# Patient Record
Sex: FEMALE | Race: WHITE | Employment: FULL TIME | URBAN - METROPOLITAN AREA
[De-identification: names, ages, dates, MRNs, and addresses within clinical notes are randomized per-mention and may not be internally consistent; named-entity substitution may affect disease eponyms.]

---

## 2017-02-10 ENCOUNTER — OFFICE VISIT (OUTPATIENT)
Dept: FAMILY MEDICINE CLINIC | Age: 57
End: 2017-02-10

## 2017-02-10 VITALS
HEART RATE: 76 BPM | SYSTOLIC BLOOD PRESSURE: 130 MMHG | DIASTOLIC BLOOD PRESSURE: 82 MMHG | OXYGEN SATURATION: 96 % | BODY MASS INDEX: 30.9 KG/M2 | WEIGHT: 177.2 LBS | TEMPERATURE: 97.7 F

## 2017-02-10 DIAGNOSIS — I10 ESSENTIAL HYPERTENSION: ICD-10-CM

## 2017-02-10 DIAGNOSIS — I25.10 CORONARY ARTERY DISEASE INVOLVING NATIVE CORONARY ARTERY OF NATIVE HEART WITHOUT ANGINA PECTORIS: Primary | ICD-10-CM

## 2017-02-10 DIAGNOSIS — R25.2 MUSCLE CRAMPING: ICD-10-CM

## 2017-02-10 DIAGNOSIS — E78.00 HYPERCHOLESTEREMIA: ICD-10-CM

## 2017-02-10 DIAGNOSIS — R73.9 HYPERGLYCEMIA: ICD-10-CM

## 2017-02-10 DIAGNOSIS — Z12.31 SCREENING MAMMOGRAM, ENCOUNTER FOR: ICD-10-CM

## 2017-02-10 LAB
ALT SERPL-CCNC: 28 U/L (ref 10–40)
ANION GAP SERPL CALCULATED.3IONS-SCNC: 13 MMOL/L (ref 3–16)
BUN BLDV-MCNC: 12 MG/DL (ref 7–20)
CALCIUM SERPL-MCNC: 9.2 MG/DL (ref 8.3–10.6)
CHLORIDE BLD-SCNC: 103 MMOL/L (ref 99–110)
CHOLESTEROL, TOTAL: 145 MG/DL (ref 0–199)
CO2: 26 MMOL/L (ref 21–32)
CREAT SERPL-MCNC: 0.7 MG/DL (ref 0.6–1.1)
GFR AFRICAN AMERICAN: >60
GFR NON-AFRICAN AMERICAN: >60
GLUCOSE BLD-MCNC: 119 MG/DL (ref 70–99)
HDLC SERPL-MCNC: 45 MG/DL (ref 40–60)
LDL CHOLESTEROL CALCULATED: 79 MG/DL
MAGNESIUM: 2.1 MG/DL (ref 1.8–2.4)
POTASSIUM SERPL-SCNC: 4.7 MMOL/L (ref 3.5–5.1)
SODIUM BLD-SCNC: 142 MMOL/L (ref 136–145)
TRIGL SERPL-MCNC: 103 MG/DL (ref 0–150)
VLDLC SERPL CALC-MCNC: 21 MG/DL

## 2017-02-10 PROCEDURE — 36415 COLL VENOUS BLD VENIPUNCTURE: CPT | Performed by: FAMILY MEDICINE

## 2017-02-10 PROCEDURE — 99214 OFFICE O/P EST MOD 30 MIN: CPT | Performed by: FAMILY MEDICINE

## 2017-02-10 RX ORDER — NITROGLYCERIN 0.4 MG/1
0.4 TABLET SUBLINGUAL EVERY 5 MIN PRN
Qty: 25 TABLET | Refills: 1 | Status: SHIPPED | OUTPATIENT
Start: 2017-02-10 | End: 2018-03-28 | Stop reason: SDUPTHER

## 2017-02-11 LAB
ESTIMATED AVERAGE GLUCOSE: 139.9 MG/DL
HBA1C MFR BLD: 6.5 %

## 2017-05-08 ENCOUNTER — TELEPHONE (OUTPATIENT)
Dept: FAMILY MEDICINE CLINIC | Age: 57
End: 2017-05-08

## 2017-05-11 ENCOUNTER — OFFICE VISIT (OUTPATIENT)
Dept: FAMILY MEDICINE CLINIC | Age: 57
End: 2017-05-11

## 2017-05-11 VITALS
BODY MASS INDEX: 31.01 KG/M2 | DIASTOLIC BLOOD PRESSURE: 80 MMHG | OXYGEN SATURATION: 92 % | WEIGHT: 175 LBS | TEMPERATURE: 97.6 F | HEART RATE: 76 BPM | SYSTOLIC BLOOD PRESSURE: 138 MMHG | HEIGHT: 63 IN

## 2017-05-11 DIAGNOSIS — R53.83 FATIGUE, UNSPECIFIED TYPE: Primary | ICD-10-CM

## 2017-05-11 DIAGNOSIS — Z12.31 SCREENING MAMMOGRAM, ENCOUNTER FOR: ICD-10-CM

## 2017-05-11 DIAGNOSIS — M54.2 NECK PAIN: ICD-10-CM

## 2017-05-11 DIAGNOSIS — R73.9 HYPERGLYCEMIA: ICD-10-CM

## 2017-05-11 DIAGNOSIS — I25.10 CORONARY ARTERY DISEASE INVOLVING NATIVE CORONARY ARTERY OF NATIVE HEART WITHOUT ANGINA PECTORIS: ICD-10-CM

## 2017-05-11 DIAGNOSIS — I10 ESSENTIAL HYPERTENSION: ICD-10-CM

## 2017-05-11 LAB
BASOPHILS ABSOLUTE: 0.1 K/UL (ref 0–0.2)
BASOPHILS RELATIVE PERCENT: 0.9 %
EOSINOPHILS ABSOLUTE: 0.2 K/UL (ref 0–0.6)
EOSINOPHILS RELATIVE PERCENT: 3 %
HCT VFR BLD CALC: 49.7 % (ref 36–48)
HEMOGLOBIN: 16.1 G/DL (ref 12–16)
LYMPHOCYTES ABSOLUTE: 2.7 K/UL (ref 1–5.1)
LYMPHOCYTES RELATIVE PERCENT: 31.7 %
MCH RBC QN AUTO: 30.1 PG (ref 26–34)
MCHC RBC AUTO-ENTMCNC: 32.4 G/DL (ref 31–36)
MCV RBC AUTO: 93 FL (ref 80–100)
MONOCYTES ABSOLUTE: 0.7 K/UL (ref 0–1.3)
MONOCYTES RELATIVE PERCENT: 8 %
NEUTROPHILS ABSOLUTE: 4.8 K/UL (ref 1.7–7.7)
NEUTROPHILS RELATIVE PERCENT: 56.4 %
PDW BLD-RTO: 14.4 % (ref 12.4–15.4)
PLATELET # BLD: 287 K/UL (ref 135–450)
PMV BLD AUTO: 8.4 FL (ref 5–10.5)
RBC # BLD: 5.35 M/UL (ref 4–5.2)
WBC # BLD: 8.4 K/UL (ref 4–11)

## 2017-05-11 PROCEDURE — 36415 COLL VENOUS BLD VENIPUNCTURE: CPT | Performed by: FAMILY MEDICINE

## 2017-05-11 PROCEDURE — 99214 OFFICE O/P EST MOD 30 MIN: CPT | Performed by: FAMILY MEDICINE

## 2017-05-11 RX ORDER — PREDNISONE 20 MG/1
20 TABLET ORAL DAILY
Qty: 7 TABLET | Refills: 0 | Status: SHIPPED | OUTPATIENT
Start: 2017-05-11 | End: 2017-09-13 | Stop reason: SDUPTHER

## 2017-05-12 LAB
ALT SERPL-CCNC: 19 U/L (ref 10–40)
ANION GAP SERPL CALCULATED.3IONS-SCNC: 13 MMOL/L (ref 3–16)
BUN BLDV-MCNC: 12 MG/DL (ref 7–20)
CALCIUM SERPL-MCNC: 9.6 MG/DL (ref 8.3–10.6)
CHLORIDE BLD-SCNC: 102 MMOL/L (ref 99–110)
CHOLESTEROL, TOTAL: 194 MG/DL (ref 0–199)
CO2: 26 MMOL/L (ref 21–32)
CREAT SERPL-MCNC: 0.7 MG/DL (ref 0.6–1.1)
ESTIMATED AVERAGE GLUCOSE: 148.5 MG/DL
GFR AFRICAN AMERICAN: >60
GFR NON-AFRICAN AMERICAN: >60
GLUCOSE BLD-MCNC: 96 MG/DL (ref 70–99)
HBA1C MFR BLD: 6.8 %
HDLC SERPL-MCNC: 44 MG/DL (ref 40–60)
LDL CHOLESTEROL CALCULATED: 101 MG/DL
POTASSIUM SERPL-SCNC: 4.6 MMOL/L (ref 3.5–5.1)
SEDIMENTATION RATE, ERYTHROCYTE: 9 MM/HR (ref 0–30)
SODIUM BLD-SCNC: 141 MMOL/L (ref 136–145)
TRIGL SERPL-MCNC: 243 MG/DL (ref 0–150)
TSH REFLEX: 0.67 UIU/ML (ref 0.27–4.2)
VLDLC SERPL CALC-MCNC: 49 MG/DL

## 2017-05-18 ENCOUNTER — OFFICE VISIT (OUTPATIENT)
Dept: CARDIOLOGY CLINIC | Age: 57
End: 2017-05-18

## 2017-05-18 VITALS
WEIGHT: 173.5 LBS | DIASTOLIC BLOOD PRESSURE: 82 MMHG | HEIGHT: 63 IN | SYSTOLIC BLOOD PRESSURE: 138 MMHG | OXYGEN SATURATION: 92 % | HEART RATE: 71 BPM | BODY MASS INDEX: 30.74 KG/M2

## 2017-05-18 DIAGNOSIS — I10 ESSENTIAL HYPERTENSION: ICD-10-CM

## 2017-05-18 DIAGNOSIS — Z72.0 TOBACCO ABUSE: ICD-10-CM

## 2017-05-18 DIAGNOSIS — E78.00 HYPERCHOLESTEREMIA: ICD-10-CM

## 2017-05-18 DIAGNOSIS — I25.10 CORONARY ARTERY DISEASE INVOLVING NATIVE CORONARY ARTERY OF NATIVE HEART WITHOUT ANGINA PECTORIS: Primary | ICD-10-CM

## 2017-05-18 DIAGNOSIS — R60.0 BILATERAL EDEMA OF LOWER EXTREMITY: ICD-10-CM

## 2017-05-18 PROCEDURE — 99214 OFFICE O/P EST MOD 30 MIN: CPT | Performed by: INTERNAL MEDICINE

## 2017-05-18 RX ORDER — ROSUVASTATIN CALCIUM 20 MG/1
20 TABLET, COATED ORAL DAILY
Qty: 30 TABLET | Refills: 11 | Status: SHIPPED | OUTPATIENT
Start: 2017-05-18 | End: 2018-05-29 | Stop reason: SDUPTHER

## 2017-06-07 ENCOUNTER — TELEPHONE (OUTPATIENT)
Dept: FAMILY MEDICINE CLINIC | Age: 57
End: 2017-06-07

## 2017-07-21 ENCOUNTER — TELEPHONE (OUTPATIENT)
Dept: FAMILY MEDICINE CLINIC | Age: 57
End: 2017-07-21

## 2017-08-03 ENCOUNTER — HOSPITAL ENCOUNTER (OUTPATIENT)
Dept: MAMMOGRAPHY | Age: 57
Discharge: HOME | End: 2017-08-03
Attending: OBSTETRICS & GYNECOLOGY | Admitting: OBSTETRICS & GYNECOLOGY

## 2017-08-03 DIAGNOSIS — Z12.31 SCREENING MAMMOGRAM, ENCOUNTER FOR: ICD-10-CM

## 2017-08-07 DIAGNOSIS — R92.8 ABNORMAL MAMMOGRAM: ICD-10-CM

## 2017-08-07 DIAGNOSIS — R92.8 ABNORMAL MAMMOGRAM OF RIGHT BREAST: Primary | ICD-10-CM

## 2017-08-17 ENCOUNTER — HOSPITAL ENCOUNTER (OUTPATIENT)
Dept: MAMMOGRAPHY | Age: 57
Discharge: HOME | End: 2017-08-17
Attending: OBSTETRICS & GYNECOLOGY | Admitting: OBSTETRICS & GYNECOLOGY

## 2017-08-17 DIAGNOSIS — R92.8 ABNORMAL MAMMOGRAM: ICD-10-CM

## 2017-08-17 DIAGNOSIS — R92.2 INCONCLUSIVE MAMMOGRAM: ICD-10-CM

## 2017-08-21 DIAGNOSIS — R92.8 ABNORMAL MAMMOGRAM: Primary | ICD-10-CM

## 2017-09-05 ENCOUNTER — TELEPHONE (OUTPATIENT)
Dept: FAMILY MEDICINE CLINIC | Age: 57
End: 2017-09-05

## 2017-09-05 RX ORDER — NITROFURANTOIN 25; 75 MG/1; MG/1
100 CAPSULE ORAL 2 TIMES DAILY
Qty: 20 CAPSULE | Refills: 0 | Status: SHIPPED | OUTPATIENT
Start: 2017-09-05 | End: 2017-09-15

## 2017-09-13 ENCOUNTER — OFFICE VISIT (OUTPATIENT)
Dept: FAMILY MEDICINE CLINIC | Age: 57
End: 2017-09-13

## 2017-09-13 VITALS
DIASTOLIC BLOOD PRESSURE: 66 MMHG | WEIGHT: 176.2 LBS | HEART RATE: 86 BPM | BODY MASS INDEX: 31.71 KG/M2 | OXYGEN SATURATION: 94 % | TEMPERATURE: 97.9 F | SYSTOLIC BLOOD PRESSURE: 110 MMHG

## 2017-09-13 DIAGNOSIS — I25.10 CORONARY ARTERY DISEASE INVOLVING NATIVE CORONARY ARTERY OF NATIVE HEART WITHOUT ANGINA PECTORIS: ICD-10-CM

## 2017-09-13 DIAGNOSIS — Z12.9 CANCER SCREENING: ICD-10-CM

## 2017-09-13 DIAGNOSIS — I10 ESSENTIAL HYPERTENSION: Primary | ICD-10-CM

## 2017-09-13 DIAGNOSIS — E78.00 HYPERCHOLESTEREMIA: ICD-10-CM

## 2017-09-13 DIAGNOSIS — M54.50 ACUTE BILATERAL LOW BACK PAIN WITHOUT SCIATICA: ICD-10-CM

## 2017-09-13 PROCEDURE — 99214 OFFICE O/P EST MOD 30 MIN: CPT | Performed by: FAMILY MEDICINE

## 2017-09-13 RX ORDER — PREDNISONE 20 MG/1
20 TABLET ORAL DAILY
Qty: 7 TABLET | Refills: 0 | Status: SHIPPED | OUTPATIENT
Start: 2017-09-13 | End: 2017-09-21 | Stop reason: SDUPTHER

## 2017-09-13 ASSESSMENT — PATIENT HEALTH QUESTIONNAIRE - PHQ9
2. FEELING DOWN, DEPRESSED OR HOPELESS: 0
1. LITTLE INTEREST OR PLEASURE IN DOING THINGS: 0
SUM OF ALL RESPONSES TO PHQ9 QUESTIONS 1 & 2: 0
SUM OF ALL RESPONSES TO PHQ QUESTIONS 1-9: 0

## 2017-09-15 ENCOUNTER — TELEPHONE (OUTPATIENT)
Dept: FAMILY MEDICINE CLINIC | Age: 57
End: 2017-09-15

## 2017-09-18 RX ORDER — CLOPIDOGREL BISULFATE 75 MG/1
TABLET ORAL
Qty: 30 TABLET | Refills: 4 | Status: ON HOLD | OUTPATIENT
Start: 2017-09-18 | End: 2017-12-05

## 2017-09-21 RX ORDER — PREDNISONE 20 MG/1
20 TABLET ORAL DAILY
Qty: 7 TABLET | Refills: 0 | Status: SHIPPED | OUTPATIENT
Start: 2017-09-21 | End: 2022-05-03 | Stop reason: SDUPTHER

## 2017-09-28 ENCOUNTER — TELEPHONE (OUTPATIENT)
Dept: FAMILY MEDICINE CLINIC | Age: 57
End: 2017-09-28

## 2017-10-31 ENCOUNTER — TELEPHONE (OUTPATIENT)
Dept: FAMILY MEDICINE CLINIC | Age: 57
End: 2017-10-31

## 2017-10-31 NOTE — TELEPHONE ENCOUNTER
PA for [facility] sent through cover my meds and was approved - pharmacy will be notified when they open

## 2017-11-13 ENCOUNTER — TELEPHONE (OUTPATIENT)
Dept: CARDIOLOGY CLINIC | Age: 57
End: 2017-11-13

## 2017-11-13 NOTE — TELEPHONE ENCOUNTER
Patient called and states she was sitting in a chair this morning and started to feel like she has pressure on her chest. No nausea, diaphoresis, or SOB at the time. She state she felt drained and went to sleep. Woke up with no pain. She sees Erica Donald next week. She is wondering if she needs any test ordered. I advised her to go to the ER if pain returns.

## 2017-11-21 ENCOUNTER — OFFICE VISIT (OUTPATIENT)
Dept: CARDIOLOGY CLINIC | Age: 57
End: 2017-11-21

## 2017-11-21 VITALS
SYSTOLIC BLOOD PRESSURE: 84 MMHG | OXYGEN SATURATION: 95 % | HEIGHT: 63 IN | HEART RATE: 87 BPM | WEIGHT: 191 LBS | TEMPERATURE: 98.4 F | DIASTOLIC BLOOD PRESSURE: 48 MMHG | BODY MASS INDEX: 33.84 KG/M2

## 2017-11-21 DIAGNOSIS — E78.2 MIXED HYPERLIPIDEMIA: ICD-10-CM

## 2017-11-21 DIAGNOSIS — Z72.0 TOBACCO ABUSE: ICD-10-CM

## 2017-11-21 DIAGNOSIS — I25.10 CORONARY ARTERY DISEASE INVOLVING NATIVE CORONARY ARTERY OF NATIVE HEART WITHOUT ANGINA PECTORIS: Primary | ICD-10-CM

## 2017-11-21 DIAGNOSIS — I10 ESSENTIAL HYPERTENSION: ICD-10-CM

## 2017-11-21 PROCEDURE — 99214 OFFICE O/P EST MOD 30 MIN: CPT | Performed by: NURSE PRACTITIONER

## 2017-11-21 RX ORDER — RANOLAZINE 500 MG/1
500 TABLET, EXTENDED RELEASE ORAL 2 TIMES DAILY
Qty: 60 TABLET | Refills: 3
Start: 2017-11-21 | End: 2017-12-12 | Stop reason: SDUPTHER

## 2017-11-21 NOTE — PROGRESS NOTES
[address]   Cardiac Evaluation    Primary Care Doctor: Elisa Ascencio MD    Chief Complaint   Patient presents with    Coronary Artery Disease    Chest Pain     heartburn occ.  Shortness of Breath     with exertion    Fatigue        History of Present Illness:   I had the pleasure of seeing Navjot Latif in follow up for CAD s/p PCI to RCA and to LCx in 2014, HTN, HLD and tobacco use. The Lipitor was changed to Crestor in May. She called about 1 week ago with complaints of chest pressure associated with fatigue that resolved with sleep. Today she reports having indigestion symptoms, burning sensation, occurs at activity. She had a feeling of overwhelming fatigue and faintness that lasted several minutes then she felt better. She is under extreme stress as  is actively dying, she has been off work caring for him. This indigestion burning sensation is what she felt at time of MI. She denies any palpitations. Her breathing is stable. She uses inhalers regularly. She smokes 1/2 ppd, had previously quit for 4.5 years then just gradually restarted. Not interested in quitting at this time. Navjot Latif describes symptoms including chest pain, dyspnea, fatigue but denies palpitations, orthopnea, PND, early saiety, edema, syncope. NYHA:   III  ACC/ AHA Stage:    C    Past Medical History:   has a past medical history of Asthma; CAD (coronary artery disease); Depression; HTN; Hyperglycemia; and Hyperlipidemia. Surgical History:   has a past surgical history that includes Breast biopsy; Coronary angioplasty with stent (03/07/14); and Coronary angioplasty with stent (03/26/14). Social History:   reports that she has been smoking Cigarettes. She has a 5.00 pack-year smoking history. She has never used smokeless tobacco. She reports that she does not drink alcohol or use drugs.    Family History:   Family History   Problem Relation Age of Onset    Heart Attack Mother  Heart Disease Mother     Diabetes Mother     Heart Failure Father     Heart Disease Father        Home Medications:  Prior to Admission medications    Medication Sig Start Date End Date Taking? Authorizing Provider   clopidogrel (PLAVIX) 75 MG tablet TAKE ONE TABLET BY MOUTH DAILY 9/18/17  Yes Demario Vega MD   fluticasone-salmeterol (ADVAIR DISKUS) 250-50 MCG/DOSE AEPB Inhale 1 puff into the lungs 2 times daily 8/28/17  Yes Delmi Villarreal MD   rosuvastatin (CRESTOR) 20 MG tablet Take 1 tablet by mouth daily 5/18/17  Yes Demario Vega MD   BREO ELLIPTA 200-25 MCG/INH AEPB INHALE ONE PUFF BY MOUTH ONCE DAILY 5/11/17  Yes Delmi Villarreal MD   nitroGLYCERIN (NITROSTAT) 0.4 MG SL tablet Place 1 tablet under the tongue every 5 minutes as needed for Chest pain 2/10/17  Yes Delmi Villarreal MD   Handicap Placard MISC by Does not apply route 5 years  DX: I25.10, J45.30 6/11/16  Yes Delmi Villarreal MD   metoprolol (LOPRESSOR) 25 MG tablet Take 0.5 tablets by mouth daily 12/29/15  Yes Delmi Villarreal MD   betamethasone valerate (VALISONE) 0.1 % cream APPLY TOPICALLY TWO TIMES A DAY AS NEEDED 7/15/15  Yes Delmi Villarreal MD   aspirin 81 MG chewable tablet Take 1 tablet by mouth daily. 3/27/14  Yes Margot Clifton CNP   albuterol (PROVENTIL HFA;VENTOLIN HFA) 108 (90 BASE) MCG/ACT inhaler Inhale 2 puffs into the lungs every 6 hours as needed. 8/3/13  Yes Delmi Villarreal MD        Allergies:  Review of patient's allergies indicates no known allergies. Review of Systems:   · Constitutional: there has been no unanticipated weight loss. There's been no change in energy level, sleep pattern, or activity level. · Eyes: No visual changes or diplopia. No scleral icterus. · ENT: No Headaches, hearing loss or vertigo. No mouth sores or sore throat. · Cardiovascular: Reviewed in HPI  · Respiratory: No cough or wheezing, no sputum production. No hematemesis.     · Gastrointestinal: No abdominal pain, appetite was small, but had no angiographic evidence of   disease, bifurcating into the LAD and left circumflex. 2. The LAD had a 70% lesion noted within the mid LAD just after a septal   branch. This was negative on the stress test with no evidence of ischemia   whatsoever and therefore left alone at this time. 3. The left circumflex was short. It had a large OM 1 branch that acted   more like a ramus that had very mild disease throughout its course. Just   after this, there was a very short focal segment of 70% to 75% stenosis in   the mid circumflex. This was the lesion that was intervened upon at this   time. 4. The right coronary artery was noted to be dominant. It had a patent   stent that was present in the mid RCA with no evidence of complication. ASSESSMENT: Successful percutaneous intervention to the mid left circumflex   using Xience Xpedition 2.25-mm x 8-mm. The 75% stenosis was reduced to 0%   with no complication and maintenance of ELIGIO 3 flow. Assessment:    1. Coronary artery disease involving native coronary artery of native heart without angina pectoris    2. Essential hypertension    3. Mixed hyperlipidemia    4. Tobacco abuse          Plan:   1. Stop the metoprolol completely  2. Start Ranexa 500 mg twice daily for the chest discomfort  3. Use the nitroglycerin as needed for the indigestion/ burning in chest  4. Schedule the stress test at [facility]  5. Stay hydrated (replace 2 cups of coffee with another bottle of water)  6. We will call you with the results of stress test and if further testing/ treatment needed  7. Follow up with me in 4-6 weeks at [facility] office      I appreciate the opportunity of cooperating in the care of this individual.    Stephanie Castle CNP, 11/21/2017, 12:53 PM    QUALITY MEASURES  1. Tobacco Cessation Counseling: Yes  2. Retake of BP if >140/90:   NA  3. Documentation to PCP/referring for new patient:  Sent to PCP at close of office visit  4.  CAD patient

## 2017-11-27 ENCOUNTER — OFFICE VISIT (OUTPATIENT)
Dept: FAMILY MEDICINE CLINIC | Age: 57
End: 2017-11-27

## 2017-11-27 VITALS
WEIGHT: 190 LBS | TEMPERATURE: 97.7 F | BODY MASS INDEX: 34.2 KG/M2 | DIASTOLIC BLOOD PRESSURE: 74 MMHG | SYSTOLIC BLOOD PRESSURE: 110 MMHG | OXYGEN SATURATION: 91 % | HEART RATE: 87 BPM

## 2017-11-27 DIAGNOSIS — I10 ESSENTIAL HYPERTENSION: ICD-10-CM

## 2017-11-27 DIAGNOSIS — I25.10 CORONARY ARTERY DISEASE INVOLVING NATIVE CORONARY ARTERY OF NATIVE HEART WITHOUT ANGINA PECTORIS: ICD-10-CM

## 2017-11-27 DIAGNOSIS — F32.A DEPRESSIVE DISORDER: ICD-10-CM

## 2017-11-27 DIAGNOSIS — M54.50 ACUTE BILATERAL LOW BACK PAIN WITHOUT SCIATICA: Primary | ICD-10-CM

## 2017-11-27 PROCEDURE — 99214 OFFICE O/P EST MOD 30 MIN: CPT | Performed by: FAMILY MEDICINE

## 2017-11-27 RX ORDER — PREDNISONE 20 MG/1
40 TABLET ORAL DAILY
Qty: 20 TABLET | Refills: 0 | Status: ON HOLD | OUTPATIENT
Start: 2017-11-27 | End: 2017-12-04

## 2017-11-27 NOTE — LETTER
'                [address] [place] [address]  Phone: [phone]  Fax: [phone]    Darlin Nunez MD        November 27, 2017     Patient: Melanie Garcia   YOB: 1960   Date of Visit: 11/27/2017       To Whom it May Concern: Latoya Fox was seen in my clinic on 11/27/2017. Please excuse her from work for 2 weeks. If you have any questions or concerns, please don't hesitate to call.     Sincerely,           Darlin Nunez MD

## 2017-11-28 ENCOUNTER — HOSPITAL ENCOUNTER (OUTPATIENT)
Dept: NON INVASIVE DIAGNOSTICS | Age: 57
Discharge: HOME | End: 2017-11-28
Admitting: NURSE PRACTITIONER

## 2017-11-28 VITALS — TEMPERATURE: 97.6 F

## 2017-11-28 DIAGNOSIS — I25.10 ATHEROSCLEROTIC HEART DISEASE OF NATIVE CORONARY ARTERY WITHOUT ANGINA PECTORIS: ICD-10-CM

## 2017-11-28 LAB
LV EF: 65 %
LVEF MODALITY: NORMAL

## 2017-11-28 ASSESSMENT — PAIN - FUNCTIONAL ASSESSMENT
PAIN_FUNCTIONAL_ASSESSMENT: 0-10
PAIN_FUNCTIONAL_ASSESSMENT: 0-10

## 2017-11-28 NOTE — PROGRESS NOTES
Pt educated on cardiac stress testing. Pt verbalizes understanding to cardiac stress testing. Questions and concerns addressed. Pt is agreeable to proceed with stress testing.

## 2017-11-29 ENCOUNTER — TELEPHONE (OUTPATIENT)
Dept: FAMILY MEDICINE CLINIC | Age: 57
End: 2017-11-29

## 2017-11-29 ENCOUNTER — TELEPHONE (OUTPATIENT)
Dept: CARDIOLOGY CLINIC | Age: 57
End: 2017-11-29

## 2017-11-29 NOTE — TELEPHONE ENCOUNTER
Left message for patient to call 1. We have not received [facility] Papers and 2.  Nadir aguirre appt scheduled on 12-2-17 does is need to be cancelled

## 2017-11-30 NOTE — TELEPHONE ENCOUNTER
Spoke with patient. Patient is scheduled with Dr. Michelle Boeck for Left Heart Cath on 12/4/17 at [facility], arrival time of 9:30am to the Cath Lab. Please have patient arrive at 9:15am to the main entrance of [facility] and check in with the registration desk. Please call patient regarding medication instructions. [email or web address] VILLAGOMEZ.

## 2017-12-04 PROBLEM — Z98.61 S/P PTCA (PERCUTANEOUS TRANSLUMINAL CORONARY ANGIOPLASTY): Status: ACTIVE | Noted: 2017-12-04

## 2017-12-04 PROBLEM — I20.0 UNSTABLE ANGINA (HCC): Status: ACTIVE | Noted: 2017-12-04

## 2017-12-11 ENCOUNTER — TELEPHONE (OUTPATIENT)
Dept: OTHER | Facility: CLINIC | Age: 57
End: 2017-12-11

## 2017-12-12 ENCOUNTER — OFFICE VISIT (OUTPATIENT)
Dept: FAMILY MEDICINE CLINIC | Age: 57
End: 2017-12-12

## 2017-12-12 VITALS
OXYGEN SATURATION: 96 % | DIASTOLIC BLOOD PRESSURE: 104 MMHG | BODY MASS INDEX: 33.8 KG/M2 | WEIGHT: 187.8 LBS | TEMPERATURE: 97.8 F | HEART RATE: 85 BPM | SYSTOLIC BLOOD PRESSURE: 174 MMHG

## 2017-12-12 DIAGNOSIS — L30.9 DERMATITIS: ICD-10-CM

## 2017-12-12 DIAGNOSIS — Z95.5 H/O HEART ARTERY STENT: ICD-10-CM

## 2017-12-12 DIAGNOSIS — Z12.9 CANCER SCREENING: ICD-10-CM

## 2017-12-12 DIAGNOSIS — I10 ESSENTIAL HYPERTENSION: ICD-10-CM

## 2017-12-12 DIAGNOSIS — I25.110 CORONARY ARTERY DISEASE INVOLVING NATIVE CORONARY ARTERY OF NATIVE HEART WITH UNSTABLE ANGINA PECTORIS (HCC): Primary | ICD-10-CM

## 2017-12-12 DIAGNOSIS — E78.00 HYPERCHOLESTEREMIA: ICD-10-CM

## 2017-12-12 PROCEDURE — 99214 OFFICE O/P EST MOD 30 MIN: CPT | Performed by: FAMILY MEDICINE

## 2017-12-12 RX ORDER — RANOLAZINE 500 MG/1
500 TABLET, EXTENDED RELEASE ORAL 2 TIMES DAILY
Qty: 60 TABLET | Refills: 3 | Status: SHIPPED | OUTPATIENT
Start: 2017-12-12 | End: 2018-03-28 | Stop reason: ALTCHOICE

## 2017-12-12 NOTE — PROGRESS NOTES
medications before use  Careful medical compliance  Proper diet and weight management   Otherwise continue current treatment plan  Call or return if symptoms are not well controlled  Go to ED if severe/significant symptoms occur    All medical conditions for this patient are stable unless otherwise indicated    Richar Chavez MD    This note was transcribed using a voice recognition software system. Proper technique and careful oversight were used to increase transcription accuracy but inadvertent errors may be present.

## 2017-12-12 NOTE — PATIENT INSTRUCTIONS
Please read the healthy family handout that you were given and share it with your family. Please compare this printed medication list with your medications at home to be sure they are the same. If you have any medications that are different please contact us immediately at [phone]. Also review your allergies that we have listed, these may also include medications that you have not been able to tolerate, make sure everything listed is correct. If you have any allergies that are different please contact us immediately at [phone].

## 2017-12-14 ENCOUNTER — TELEPHONE (OUTPATIENT)
Dept: FAMILY MEDICINE CLINIC | Age: 57
End: 2017-12-14

## 2017-12-14 NOTE — TELEPHONE ENCOUNTER
Noted   This was prescribed by her heart doctor so advise her to call them. They may have samples or may agree to switch this med.

## 2017-12-20 ENCOUNTER — TELEPHONE (OUTPATIENT)
Dept: CARDIOLOGY CLINIC | Age: 57
End: 2017-12-20

## 2017-12-20 NOTE — TELEPHONE ENCOUNTER
Pt sts ranolazine (RANEXA) 500 MG extended release tablet is to expensive. Is there another medication that is cheaper.  Please advise

## 2017-12-20 NOTE — TELEPHONE ENCOUNTER
There is no alternative for Ranexa. Can we support her with samples for couple of weeks?    Hillary Jacobson NP

## 2017-12-27 ENCOUNTER — OFFICE VISIT (OUTPATIENT)
Dept: CARDIOLOGY CLINIC | Age: 57
End: 2017-12-27

## 2017-12-27 VITALS
HEIGHT: 63 IN | SYSTOLIC BLOOD PRESSURE: 112 MMHG | HEART RATE: 84 BPM | WEIGHT: 188 LBS | OXYGEN SATURATION: 93 % | DIASTOLIC BLOOD PRESSURE: 68 MMHG | BODY MASS INDEX: 33.31 KG/M2

## 2017-12-27 DIAGNOSIS — I25.10 CORONARY ARTERY DISEASE INVOLVING NATIVE CORONARY ARTERY OF NATIVE HEART WITHOUT ANGINA PECTORIS: Primary | ICD-10-CM

## 2017-12-27 DIAGNOSIS — I10 ESSENTIAL HYPERTENSION: ICD-10-CM

## 2017-12-27 DIAGNOSIS — Z72.0 TOBACCO ABUSE: ICD-10-CM

## 2017-12-27 DIAGNOSIS — E78.2 MIXED HYPERLIPIDEMIA: ICD-10-CM

## 2017-12-27 PROCEDURE — 99214 OFFICE O/P EST MOD 30 MIN: CPT | Performed by: NURSE PRACTITIONER

## 2017-12-27 NOTE — PROGRESS NOTES
[address]   Cardiac Evaluation    Primary Care Doctor: Delmi Villarreal MD    Chief Complaint   Patient presents with    Follow Up After Procedure     s/p PCI 12/04/2017 by Dr. Nancy Moore Coronary Artery Disease    Hyperlipidemia    Hypertension    Medication Adjustment    Discuss Labs     CBC & BMP 12/05/2017, Lipids 12/04/2017    Other     groin site healed        History of Present Illness:   I had the pleasure of seeing Gaurang Echevarria in follow up for recent hospitalization. Gaurang Echevarria was admitted following [facility] with PCI to the LAD. She had been seen in the office with symptoms of chest pain and indigestion which is her angina. A stress test was abnormal leading to the left heart catheterization. She tolerated this well. Her  passed away a few days prior to the procedure. Her metoprolol was held due to hypotension but then resumed at discharge. She is feeling better but still having some indigestion in mid afternoon which she feels is related to stress. Her sleep is also poor due to ruminating, pictures and scenario's of her , feels it is panic attack at night. The Ranexa has helped her angina. Her appetite is fair. She feels overall her chest pain/ angina is much better. She has not restarted the metoprolol as feels her BP is okay. Gaurang Echevarria describes symptoms including chest pain, fatigue but denies dyspnea, palpitations, orthopnea, PND, early saiety, edema, syncope. Past Medical History:   has a past medical history of Asthma; CAD (coronary artery disease); Depression; HTN; Hyperglycemia; and Hyperlipidemia. Surgical History:   has a past surgical history that includes Breast biopsy; Coronary angioplasty with stent (03/07/2014); Coronary angioplasty with stent (03/26/2014); and Coronary angioplasty with stent (12/04/2017). Social History:   reports that she has been smoking Cigarettes. She has a 10.00 pack-year smoking history.  She has never used smokeless tobacco. She reports that she does not drink alcohol or use drugs. Family History:   Family History   Problem Relation Age of Onset    Heart Attack Mother     Heart Disease Mother     Diabetes Mother     Heart Failure Father     Heart Disease Father        Home Medications:  Prior to Admission medications    Medication Sig Start Date End Date Taking? Authorizing Provider   ranolazine (RANEXA) 500 MG extended release tablet Take 1 tablet by mouth 2 times daily 12/12/17  Yes Allyson Aguilar MD   clopidogrel (PLAVIX) 75 MG tablet Take 1 tablet by mouth daily 12/5/17  Yes Venancio Hoffman NP   metoprolol tartrate (LOPRESSOR) 25 MG tablet Take 0.5 tablets by mouth daily 12/5/17  Yes Venancio Hoffman NP   fluticasone-salmeterol (ADVAIR DISKUS) 250-50 MCG/DOSE AEPB Inhale 1 puff into the lungs 2 times daily 11/27/17  Yes Allyson Aguilar MD   rosuvastatin (CRESTOR) 20 MG tablet Take 1 tablet by mouth daily 5/18/17  Yes Douglas Vasquez MD   BREO ELLIPTA 200-25 MCG/INH AEPB INHALE ONE PUFF BY MOUTH ONCE DAILY 5/11/17  Yes Allyson Aguilar MD   nitroGLYCERIN (NITROSTAT) 0.4 MG SL tablet Place 1 tablet under the tongue every 5 minutes as needed for Chest pain 2/10/17  Yes Allyson Aguilar MD   Handicap Placard MISC by Does not apply route 5 years  DX: I25.10, J45.30 6/11/16  Yes Allyson Aguilar MD   betamethasone valerate (VALISONE) 0.1 % cream APPLY TOPICALLY TWO TIMES A DAY AS NEEDED 7/15/15  Yes Allyson Aguilar MD   aspirin 81 MG chewable tablet Take 1 tablet by mouth daily. 3/27/14  Yes Margot Clifton CNP   albuterol (PROVENTIL HFA;VENTOLIN HFA) 108 (90 BASE) MCG/ACT inhaler Inhale 2 puffs into the lungs every 6 hours as needed. 8/3/13  Yes Allyson Aguilar MD        Allergies:  Review of patient's allergies indicates no known allergies. Review of Systems:   · Constitutional: there has been no unanticipated weight loss.      · Eyes: No vision changes  · ENT: No Headaches, no nasal congestion. No mouth sores or sore throat. · Cardiovascular: Reviewed in HPI  · Respiratory: No cough or wheezing, no sputum production. · Gastrointestinal: No abdominal pain, no constipation or diarrhea  · Genitourinary: No dysuria, trouble voiding, or hematuria. · Musculoskeletal:  No weakness or joint complaints. · Integumentary: No rash or pruritis. · Neurological: No numbness or tingling. No weakness. No tremor. · Psychiatric: No anxiety, no depression. · Endocrine:  No excessive thirst or urination. · Hematologic/Lymphatic: No abnormal bruising or bleeding, blood clots or swollen lymph nodes. Physical Examination:    Vitals:    12/27/17 0842   BP: 112/68   Pulse: 84   SpO2: 93%   Weight: 188 lb (85.3 kg)   Height: 5' 3\" (1.6 m)        Constitutional and General Appearance: Warm and dry, no apparent distress, normal coloration  HEENT:  Normocephalic, atraumatic  Respiratory:  · Normal excursion and expansion without use of accessory muscles  · Resp Auscultation: Normal breath sounds without dullness  Cardiovascular:  · The apical impulses not displaced  · Heart tones are crisp and normal  · JVP 8 cm H2O  · Regular rate and rhythm, normal s1S2, no m/g/r/c  · Peripheral pulses are symmetrical and full  · There is no clubbing, cyanosis of the extremities.   · No edema  · Pedal Pulses: 2+ and equal     Abdomen:  · No masses or tenderness  · Liver/Spleen: No Abnormalities Noted  Neurological/Psychiatric:  · Alert and oriented in all spheres  · Moves all extremities well  · Exhibits normal gait balance and coordination  · No abnormalities of mood, affect, memory, mentation, or behavior are noted    Lab Data:  CBC:   Lab Results   Component Value Date    WBC 9.5 12/05/2017    WBC 9.1 12/04/2017    WBC 8.4 05/11/2017    RBC 4.83 12/05/2017    RBC 5.41 12/04/2017    RBC 5.35 05/11/2017    HGB 14.7 12/05/2017    HGB 16.3 12/04/2017    HGB 16.1 05/11/2017    HCT 44.3 12/05/2017    HCT 49.4 12/04/2017

## 2017-12-27 NOTE — PATIENT INSTRUCTIONS
1. Hold on the metoprolol for now  2. Continue the Ranexa 500 mg twice daily  3. No change in the aspirin, Plavix or Crestor  4. Continue to work on stopping smoking and low fat diet  5. Follow up with Dr. Quentin Navarro in 3 months        Ref.  Range 12/4/2017 09:50   Cholesterol, Total Latest Ref Range: 0 - 199 mg/dL 148   HDL Cholesterol Latest Ref Range: >40 mg/dL 43   LDL Calculated Latest Ref Range: <70 mg/dL 79   Triglycerides Latest Ref Range: 0 - 150 mg/dL 128

## 2018-01-03 ENCOUNTER — NURSE ONLY (OUTPATIENT)
Dept: FAMILY MEDICINE CLINIC | Age: 58
End: 2018-01-03

## 2018-01-03 DIAGNOSIS — Z12.9 CANCER SCREENING: ICD-10-CM

## 2018-01-03 LAB
CONTROL: NORMAL
HEMOCCULT STL QL: NEGATIVE

## 2018-01-03 PROCEDURE — 82274 ASSAY TEST FOR BLOOD FECAL: CPT | Performed by: FAMILY MEDICINE

## 2018-01-09 ENCOUNTER — OFFICE VISIT (OUTPATIENT)
Dept: FAMILY MEDICINE CLINIC | Age: 58
End: 2018-01-09

## 2018-01-09 VITALS
HEART RATE: 84 BPM | DIASTOLIC BLOOD PRESSURE: 92 MMHG | TEMPERATURE: 97.5 F | OXYGEN SATURATION: 95 % | SYSTOLIC BLOOD PRESSURE: 138 MMHG | WEIGHT: 193.4 LBS | BODY MASS INDEX: 34.26 KG/M2

## 2018-01-09 DIAGNOSIS — I10 ESSENTIAL HYPERTENSION: ICD-10-CM

## 2018-01-09 DIAGNOSIS — I25.110 CORONARY ARTERY DISEASE INVOLVING NATIVE CORONARY ARTERY OF NATIVE HEART WITH UNSTABLE ANGINA PECTORIS (HCC): ICD-10-CM

## 2018-01-09 DIAGNOSIS — F41.8 SITUATIONAL ANXIETY: ICD-10-CM

## 2018-01-09 DIAGNOSIS — J45.30 MILD PERSISTENT ASTHMA WITHOUT COMPLICATION: ICD-10-CM

## 2018-01-09 DIAGNOSIS — F32.0 MAJOR DEPRESSIVE DISORDER, SINGLE EPISODE, MILD (HCC): Primary | ICD-10-CM

## 2018-01-09 PROCEDURE — 99214 OFFICE O/P EST MOD 30 MIN: CPT | Performed by: FAMILY MEDICINE

## 2018-01-09 NOTE — PROGRESS NOTES
Subjective: Sandy Watts is here to discuss the following issues. She has a history of mild depression and situational anxiety. Her   about one month ago. He was briefly in hospice for cancer. She states that overall emotionally she is improving. No suicidal or homicidal thoughts. She does have significant trouble sleeping. She is not able to return to work yet. At times she feels very anxious and nervous. She continues to smoke about one half pack per day. She has coronary artery disease. No chest pain or pressure. No lightheadedness nausea vomiting or diaphoresis. She has essential hypertension. She is off metoprolol. Her blood pressures at home and been well-controlled. She stop metoprolol because it made her tired and short of breath. She has a history of asthma. She has been on Advair and uses albuterol needed. No recent chest tightness shortness of breath wheezing or cough  History   Smoking Status    Current Some Day Smoker    Packs/day: 0.50    Years: 20.00    Types: Cigarettes   Smokeless Tobacco    Never Used   Allergies:     Review of patient's allergies indicates no known allergies. Objective:  BP (!) 138/92   Pulse 84   Temp 97.5 °F (36.4 °C) (Oral)   Wt 193 lb 6.4 oz (87.7 kg)   SpO2 95%   BMI 34.26 kg/m²    No acute distress, heart regular rate and rhythm without murmur, lungs clear to auscultation easy effort, abdomen soft nondistended, no clubbing or cyanosis mood sad affect reactive no edema    Assessment:  1. Major depressive disorder, single episode, mild ([address]    2. Situational anxiety    3. Coronary artery disease involving native coronary artery of native heart with unstable angina pectoris ([address]    4. Essential hypertension    5.  Mild persistent asthma without complication            Plan:  Remain off work until she follows up in one month  Continue current medicines  Samples were provided  She is unable to tolerate beta blockers  Follow-up in

## 2018-02-07 ENCOUNTER — OFFICE VISIT (OUTPATIENT)
Dept: FAMILY MEDICINE CLINIC | Age: 58
End: 2018-02-07

## 2018-02-07 VITALS
DIASTOLIC BLOOD PRESSURE: 87 MMHG | HEART RATE: 87 BPM | BODY MASS INDEX: 34.86 KG/M2 | TEMPERATURE: 98.2 F | OXYGEN SATURATION: 92 % | SYSTOLIC BLOOD PRESSURE: 138 MMHG | WEIGHT: 196.8 LBS

## 2018-02-07 DIAGNOSIS — F41.8 SITUATIONAL ANXIETY: Primary | ICD-10-CM

## 2018-02-07 DIAGNOSIS — F32.0 MAJOR DEPRESSIVE DISORDER, SINGLE EPISODE, MILD (HCC): ICD-10-CM

## 2018-02-07 DIAGNOSIS — R53.83 FATIGUE, UNSPECIFIED TYPE: ICD-10-CM

## 2018-02-07 PROCEDURE — 99213 OFFICE O/P EST LOW 20 MIN: CPT | Performed by: FAMILY MEDICINE

## 2018-02-07 RX ORDER — FLUTICASONE FUROATE AND VILANTEROL 100; 25 UG/1; UG/1
1 POWDER RESPIRATORY (INHALATION) DAILY
Qty: 1 EACH | Refills: 0 | COMMUNITY
Start: 2018-02-07 | End: 2018-03-14

## 2018-02-12 ENCOUNTER — TELEPHONE (OUTPATIENT)
Dept: FAMILY MEDICINE CLINIC | Age: 58
End: 2018-02-12

## 2018-03-14 ENCOUNTER — OFFICE VISIT (OUTPATIENT)
Dept: FAMILY MEDICINE CLINIC | Age: 58
End: 2018-03-14

## 2018-03-14 VITALS
BODY MASS INDEX: 33.9 KG/M2 | SYSTOLIC BLOOD PRESSURE: 110 MMHG | TEMPERATURE: 97.5 F | DIASTOLIC BLOOD PRESSURE: 80 MMHG | OXYGEN SATURATION: 93 % | WEIGHT: 191.4 LBS | HEART RATE: 88 BPM

## 2018-03-14 DIAGNOSIS — I10 ESSENTIAL HYPERTENSION: ICD-10-CM

## 2018-03-14 DIAGNOSIS — E78.00 HYPERCHOLESTEREMIA: ICD-10-CM

## 2018-03-14 DIAGNOSIS — J45.30 MILD PERSISTENT ASTHMA WITHOUT COMPLICATION: ICD-10-CM

## 2018-03-14 DIAGNOSIS — R73.9 HYPERGLYCEMIA: ICD-10-CM

## 2018-03-14 DIAGNOSIS — R92.8 ABNORMAL MAMMOGRAM: ICD-10-CM

## 2018-03-14 DIAGNOSIS — I25.110 CORONARY ARTERY DISEASE INVOLVING NATIVE CORONARY ARTERY OF NATIVE HEART WITH UNSTABLE ANGINA PECTORIS (HCC): Primary | ICD-10-CM

## 2018-03-14 LAB
ALT SERPL-CCNC: 19 U/L (ref 10–40)
ANION GAP SERPL CALCULATED.3IONS-SCNC: 17 MMOL/L (ref 3–16)
BUN BLDV-MCNC: 11 MG/DL (ref 7–20)
CALCIUM SERPL-MCNC: 9 MG/DL (ref 8.3–10.6)
CHLORIDE BLD-SCNC: 103 MMOL/L (ref 99–110)
CHOLESTEROL, TOTAL: 133 MG/DL (ref 0–199)
CO2: 25 MMOL/L (ref 21–32)
CREAT SERPL-MCNC: 0.7 MG/DL (ref 0.6–1.1)
GFR AFRICAN AMERICAN: >60
GFR NON-AFRICAN AMERICAN: >60
GLUCOSE BLD-MCNC: 110 MG/DL (ref 70–99)
HDLC SERPL-MCNC: 50 MG/DL (ref 40–60)
LDL CHOLESTEROL CALCULATED: 66 MG/DL
POTASSIUM SERPL-SCNC: 4.5 MMOL/L (ref 3.5–5.1)
SODIUM BLD-SCNC: 145 MMOL/L (ref 136–145)
TRIGL SERPL-MCNC: 85 MG/DL (ref 0–150)
TSH REFLEX: 0.53 UIU/ML (ref 0.27–4.2)
VLDLC SERPL CALC-MCNC: 17 MG/DL

## 2018-03-14 PROCEDURE — 36415 COLL VENOUS BLD VENIPUNCTURE: CPT | Performed by: FAMILY MEDICINE

## 2018-03-14 PROCEDURE — 99214 OFFICE O/P EST MOD 30 MIN: CPT | Performed by: FAMILY MEDICINE

## 2018-03-14 NOTE — PATIENT INSTRUCTIONS
Please read the healthy family handout that you were given and share it with your family. Please compare this printed medication list with your medications at home to be sure they are the same. If you have any medications that are different please contact us immediately at [phone]. Also review your allergies that we have listed, these may also include medications that you have not been able to tolerate, make sure everything listed is correct. If you have any allergies that are different please contact us immediately at [phone].

## 2018-03-14 NOTE — PROGRESS NOTES
Patient given phone number and order to call and schedule mammogram. Patient was encouraged to make the mammogram appt

## 2018-03-14 NOTE — PROGRESS NOTES
Subjective: Vargas Lama is here to discuss the following issues. She has coronary artery disease. No chest pain or pressure. She is physically active at work and at home without difficulty. She has elevated cholesterol and continues on her medicine and tries to follow an appropriate diet. No muscle aches or other medicine side effects. She has hypertension her blood pressures are consistently well-controlled. She has elevated sugar without diabetes and no polydipsia or polyuria. She has a history of asthma and continues to smoke. She continues on her inhaler with no increased wheezing cough chest tightness or shortness of breath. She had an abnormal mammogram and did not have her follow-up 6 month diagnostic retest.  She is willing to do this now. History   Smoking Status    Current Some Day Smoker    Packs/day: 0.50    Years: 20.00    Types: Cigarettes   Smokeless Tobacco    Never Used   Allergies:     Patient has no known allergies. Objective:  /80   Pulse 88   Temp 97.5 °F (36.4 °C) (Oral)   Wt 191 lb 6.4 oz (86.8 kg)   SpO2 93%   BMI 33.90 kg/m²    No acute distress, heart regular rate and rhythm without murmur, lungs clear to auscultation easy effort, abdomen soft nondistended, no clubbing or cyanosis mood happy affect reactive    Assessment:  1. Coronary artery disease involving native coronary artery of native heart with unstable angina pectoris ([address]    2. Hypercholesteremia    3. Essential hypertension    4. Hyperglycemia    5. Mild persistent asthma without complication    6. Abnormal mammogram            Plan:  Labs ordered  Samples of Advair  Again advised to have right breast diagnostic mammogram  Her  passed away about 3 months ago  She is back to work at [employer] current medicines  Keep follow-up appointment with cardiology  Follow-up with me in 6 months or p.r.n. \"Healthy Family Handout\" provided  Avoid exposure to all tobacco products.   Read and

## 2018-03-15 LAB
ESTIMATED AVERAGE GLUCOSE: 148.5 MG/DL
HBA1C MFR BLD: 6.8 %

## 2018-03-28 ENCOUNTER — OFFICE VISIT (OUTPATIENT)
Dept: CARDIOLOGY CLINIC | Age: 58
End: 2018-03-28

## 2018-03-28 VITALS
DIASTOLIC BLOOD PRESSURE: 80 MMHG | WEIGHT: 190 LBS | BODY MASS INDEX: 33.66 KG/M2 | OXYGEN SATURATION: 93 % | SYSTOLIC BLOOD PRESSURE: 130 MMHG | HEIGHT: 63 IN | HEART RATE: 84 BPM

## 2018-03-28 DIAGNOSIS — Z98.61 S/P PTCA (PERCUTANEOUS TRANSLUMINAL CORONARY ANGIOPLASTY): ICD-10-CM

## 2018-03-28 DIAGNOSIS — Z95.5 H/O HEART ARTERY STENT: ICD-10-CM

## 2018-03-28 DIAGNOSIS — I10 ESSENTIAL HYPERTENSION: ICD-10-CM

## 2018-03-28 DIAGNOSIS — R94.39 ABNORMAL STRESS TEST: ICD-10-CM

## 2018-03-28 DIAGNOSIS — I21.4 NSTEMI (NON-ST ELEVATED MYOCARDIAL INFARCTION) (HCC): ICD-10-CM

## 2018-03-28 DIAGNOSIS — I25.110 CORONARY ARTERY DISEASE INVOLVING NATIVE CORONARY ARTERY OF NATIVE HEART WITH UNSTABLE ANGINA PECTORIS (HCC): Primary | ICD-10-CM

## 2018-03-28 DIAGNOSIS — E78.00 HYPERCHOLESTEREMIA: ICD-10-CM

## 2018-03-28 DIAGNOSIS — Z72.0 TOBACCO ABUSE: ICD-10-CM

## 2018-03-28 PROCEDURE — 99214 OFFICE O/P EST MOD 30 MIN: CPT | Performed by: INTERNAL MEDICINE

## 2018-03-28 RX ORDER — ATENOLOL 25 MG/1
25 TABLET ORAL DAILY
Qty: 30 TABLET | Refills: 3 | Status: SHIPPED | OUTPATIENT
Start: 2018-03-28 | End: 2018-09-28 | Stop reason: SDUPTHER

## 2018-03-28 RX ORDER — NITROGLYCERIN 0.4 MG/1
0.4 TABLET SUBLINGUAL EVERY 5 MIN PRN
Qty: 25 TABLET | Refills: 1 | Status: SHIPPED | OUTPATIENT
Start: 2018-03-28 | End: 2019-09-05 | Stop reason: SDUPTHER
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
## 2018-05-30 RX ORDER — ROSUVASTATIN CALCIUM 20 MG/1
TABLET, COATED ORAL
Qty: 30 TABLET | Refills: 5 | Status: SHIPPED | OUTPATIENT
Start: 2018-05-30 | End: 2019-01-06 | Stop reason: SDUPTHER

## 2018-09-28 ENCOUNTER — OFFICE VISIT (OUTPATIENT)
Dept: FAMILY MEDICINE CLINIC | Age: 58
End: 2018-09-28
Payer: COMMERCIAL

## 2018-09-28 VITALS
OXYGEN SATURATION: 93 % | SYSTOLIC BLOOD PRESSURE: 129 MMHG | WEIGHT: 174.8 LBS | DIASTOLIC BLOOD PRESSURE: 83 MMHG | HEART RATE: 92 BPM | BODY MASS INDEX: 30.96 KG/M2 | TEMPERATURE: 97.9 F

## 2018-09-28 DIAGNOSIS — M19.90 ARTHRITIS: ICD-10-CM

## 2018-09-28 DIAGNOSIS — R73.9 HYPERGLYCEMIA: ICD-10-CM

## 2018-09-28 DIAGNOSIS — R30.0 DYSURIA: Primary | ICD-10-CM

## 2018-09-28 DIAGNOSIS — E78.00 HYPERCHOLESTEREMIA: ICD-10-CM

## 2018-09-28 DIAGNOSIS — I25.110 CORONARY ARTERY DISEASE INVOLVING NATIVE CORONARY ARTERY OF NATIVE HEART WITH UNSTABLE ANGINA PECTORIS (HCC): ICD-10-CM

## 2018-09-28 LAB
ALT SERPL-CCNC: 14 U/L (ref 10–40)
ANION GAP SERPL CALCULATED.3IONS-SCNC: 12 MMOL/L (ref 3–16)
BILIRUBIN, POC: NEGATIVE
BLOOD URINE, POC: NEGATIVE
BUN BLDV-MCNC: 7 MG/DL (ref 7–20)
CALCIUM SERPL-MCNC: 9.3 MG/DL (ref 8.3–10.6)
CHLORIDE BLD-SCNC: 100 MMOL/L (ref 99–110)
CHOLESTEROL, TOTAL: 139 MG/DL (ref 0–199)
CLARITY, POC: CLEAR
CO2: 26 MMOL/L (ref 21–32)
COLOR, POC: YELLOW
CREAT SERPL-MCNC: 0.7 MG/DL (ref 0.6–1.1)
GFR AFRICAN AMERICAN: >60
GFR NON-AFRICAN AMERICAN: >60
GLUCOSE BLD-MCNC: 109 MG/DL (ref 70–99)
GLUCOSE URINE, POC: NEGATIVE
HDLC SERPL-MCNC: 47 MG/DL (ref 40–60)
KETONES, POC: NEGATIVE
LDL CHOLESTEROL CALCULATED: 68 MG/DL
LEUKOCYTE EST, POC: NEGATIVE
NITRITE, POC: NEGATIVE
PH, POC: 7.5
POTASSIUM SERPL-SCNC: 4.5 MMOL/L (ref 3.5–5.1)
PROTEIN, POC: NEGATIVE
SODIUM BLD-SCNC: 138 MMOL/L (ref 136–145)
SPECIFIC GRAVITY, POC: 1.01
TRIGL SERPL-MCNC: 119 MG/DL (ref 0–150)
UROBILINOGEN, POC: 0.2
VLDLC SERPL CALC-MCNC: 24 MG/DL

## 2018-09-28 PROCEDURE — 99214 OFFICE O/P EST MOD 30 MIN: CPT | Performed by: FAMILY MEDICINE

## 2018-09-28 PROCEDURE — 81002 URINALYSIS NONAUTO W/O SCOPE: CPT | Performed by: FAMILY MEDICINE

## 2018-09-28 PROCEDURE — 36415 COLL VENOUS BLD VENIPUNCTURE: CPT | Performed by: FAMILY MEDICINE

## 2018-09-28 RX ORDER — FLUTICASONE FUROATE AND VILANTEROL 100; 25 UG/1; UG/1
1 POWDER RESPIRATORY (INHALATION) DAILY
Qty: 1 EACH | Refills: 5 | Status: SHIPPED | OUTPATIENT
Start: 2018-09-28 | End: 2019-07-14 | Stop reason: SDUPTHER

## 2018-09-28 RX ORDER — FLUTICASONE FUROATE AND VILANTEROL 100; 25 UG/1; UG/1
1 POWDER RESPIRATORY (INHALATION) DAILY
COMMUNITY
End: 2018-09-28 | Stop reason: SDUPTHER

## 2018-09-28 RX ORDER — ATENOLOL 25 MG/1
25 TABLET ORAL DAILY
Qty: 30 TABLET | Refills: 3 | Status: SHIPPED | OUTPATIENT
Start: 2018-09-28 | End: 2019-05-10

## 2018-09-28 RX ORDER — TRAMADOL HYDROCHLORIDE 50 MG/1
50 TABLET ORAL NIGHTLY PRN
Qty: 30 TABLET | Refills: 5 | Status: SHIPPED | OUTPATIENT
Start: 2018-09-28 | End: 2018-10-28

## 2018-09-28 NOTE — PROGRESS NOTES
current treatment plan  Call or return if symptoms are not well controlled  Go to ED if severe/significant symptoms occur    All medical conditions for this patient are stable unless otherwise indicated    Blaine Claudio MD    This note was transcribed using a voice recognition software system. Proper technique and careful oversight were used to increase transcription accuracy but inadvertent errors may be present.

## 2018-09-29 LAB
ESTIMATED AVERAGE GLUCOSE: 137 MG/DL
HBA1C MFR BLD: 6.4 %

## 2018-10-01 ENCOUNTER — TELEPHONE (OUTPATIENT)
Dept: FAMILY MEDICINE CLINIC | Age: 58
End: 2018-10-01

## 2018-10-03 ENCOUNTER — OFFICE VISIT (OUTPATIENT)
Dept: CARDIOLOGY CLINIC | Age: 58
End: 2018-10-03
Payer: COMMERCIAL

## 2018-10-03 VITALS
OXYGEN SATURATION: 91 % | SYSTOLIC BLOOD PRESSURE: 124 MMHG | WEIGHT: 171 LBS | BODY MASS INDEX: 30.3 KG/M2 | DIASTOLIC BLOOD PRESSURE: 72 MMHG | HEIGHT: 63 IN | HEART RATE: 85 BPM

## 2018-10-03 DIAGNOSIS — Z72.0 TOBACCO ABUSE: ICD-10-CM

## 2018-10-03 DIAGNOSIS — I25.110 CORONARY ARTERY DISEASE INVOLVING NATIVE CORONARY ARTERY OF NATIVE HEART WITH UNSTABLE ANGINA PECTORIS (HCC): Primary | ICD-10-CM

## 2018-10-03 DIAGNOSIS — I10 ESSENTIAL HYPERTENSION: ICD-10-CM

## 2018-10-03 DIAGNOSIS — E78.00 HYPERCHOLESTEREMIA: ICD-10-CM

## 2018-10-03 PROCEDURE — 99213 OFFICE O/P EST LOW 20 MIN: CPT | Performed by: NURSE PRACTITIONER

## 2018-10-03 NOTE — PROGRESS NOTES
[facility], [place], operated by [facility]   Cardiac Evaluation    Primary Care Doctor: Kylah Rehman MD    Chief Complaint   Patient presents with    6 Month Follow-Up     seen Dr. David Stephen 03/28/2018    Coronary Artery Disease    Hyperlipidemia    Hypertension    Medication Adjustment    Discuss Labs     bmp & lip 09/28/2018    Palpitations     feels like heart flops - hasn't started atenolol    Shortness of Breath     weather change    Edema     little from potato chips    Fatigue     only sleeps 3 hrs daily        History of Present Illness:   I had the pleasure of seeing Nuris Glover in follow up for CAD s/p PCI to RCA (2013), LCx (2014) and LAD (2017), HTN, HLD and smoker. She denies any chest pain. She has occasional shortness of breath that she attributes to the weather change and asthma. She voices concern that her heart \"Flips\" at times. She has not started atenolol that was prescribed previously until she talked with me. She was worried it would lower her blood pressure. She does not monitor her blood pressure at home. She works at [employer] and can take it there if needed. She voices some swelling in her face and bilateral legs. She denies any indigestion. Her appetite been better recently. She is down approximately 16 lbs since March 2018. She stated she has been watching what she eats and is now back to work after the loss of her . Nuris Glover describes symptoms including dyspnea, palpitations, edema, and fatigue but denies chest pain, orthopnea, PND, exertional chest pressure/discomfort, syncope. NYHA:   II  ACC/ AHA Stage:    C    Past Medical History:   has a past medical history of Asthma; CAD (coronary artery disease); Depression; HTN; Hyperglycemia; and Hyperlipidemia. Surgical History:   has a past surgical history that includes Breast biopsy; Coronary angioplasty with stent (03/07/2014);  Coronary angioplasty with stent (03/26/2014); and Coronary angioplasty with stent

## 2019-01-07 RX ORDER — ROSUVASTATIN CALCIUM 20 MG/1
TABLET, COATED ORAL
Qty: 30 TABLET | Refills: 4 | Status: SHIPPED | OUTPATIENT
Start: 2019-01-07 | End: 2019-07-05 | Stop reason: SDUPTHER

## 2019-01-07 RX ORDER — CLOPIDOGREL BISULFATE 75 MG/1
TABLET ORAL
Qty: 30 TABLET | Refills: 10 | Status: SHIPPED | OUTPATIENT
Start: 2019-01-07 | End: 2019-07-10 | Stop reason: SDUPTHER

## 2019-05-10 ENCOUNTER — OFFICE VISIT (OUTPATIENT)
Dept: FAMILY MEDICINE CLINIC | Age: 59
End: 2019-05-10
Payer: COMMERCIAL

## 2019-05-10 VITALS
BODY MASS INDEX: 31.04 KG/M2 | SYSTOLIC BLOOD PRESSURE: 135 MMHG | WEIGHT: 175.2 LBS | HEART RATE: 90 BPM | HEIGHT: 63 IN | OXYGEN SATURATION: 90 % | TEMPERATURE: 97.9 F | DIASTOLIC BLOOD PRESSURE: 85 MMHG

## 2019-05-10 DIAGNOSIS — G47.30 SLEEP APNEA, UNSPECIFIED TYPE: ICD-10-CM

## 2019-05-10 DIAGNOSIS — I10 ESSENTIAL HYPERTENSION: ICD-10-CM

## 2019-05-10 DIAGNOSIS — R92.8 ABNORMAL MAMMOGRAM: ICD-10-CM

## 2019-05-10 DIAGNOSIS — E78.00 HYPERCHOLESTEREMIA: ICD-10-CM

## 2019-05-10 DIAGNOSIS — J45.30 MILD PERSISTENT ASTHMA WITHOUT COMPLICATION: ICD-10-CM

## 2019-05-10 DIAGNOSIS — I25.110 CORONARY ARTERY DISEASE INVOLVING NATIVE CORONARY ARTERY OF NATIVE HEART WITH UNSTABLE ANGINA PECTORIS (HCC): Primary | ICD-10-CM

## 2019-05-10 DIAGNOSIS — Z12.11 COLON CANCER SCREENING: ICD-10-CM

## 2019-05-10 PROCEDURE — 99214 OFFICE O/P EST MOD 30 MIN: CPT | Performed by: FAMILY MEDICINE

## 2019-05-10 NOTE — PROGRESS NOTES
Subjective: Maryanna Olszewski is here to discuss the following issues. She has coronary artery disease. No chest pain or chest pressure. She is physically active at work without related difficulty. She has hypertension her blood pressures are consistently well-controlled. She stopped atenolol because she felt confused and lightheaded and the symptoms resolved without recurrence. She has elevated cholesterol and continues on her medicine with no muscle aches. She has a history of asthma with her inhaler has no wheezing cough or chest tightness. She has loud snoring at night and is very tired during the day. She often awakes symptoms with a headache. Social History     Tobacco Use   Smoking Status Current Every Day Smoker    Packs/day: 0.50    Years: 20.00    Pack years: 10.00    Types: Cigarettes   Smokeless Tobacco Never Used   Allergies:     Atenolol    Objective:  /85   Pulse 90   Temp 97.9 °F (36.6 °C) (Oral)   Ht 5' 3\" (1.6 m) Comment: with shoes  Wt 175 lb 3.2 oz (79.5 kg)   SpO2 90%   BMI 31.04 kg/m²    No acute distress, heart regular rate and rhythm without murmur, lungs clear to auscultation easy effort, abdomen soft nondistended, no clubbing or cyanosis    Assessment:  1. Coronary artery disease involving native coronary artery of native heart with unstable angina pectoris ([address]    2. Essential hypertension    3. Hypercholesteremia    4. Mild persistent asthma without complication    5. Sleep apnea, unspecified type    6. Colon cancer screening            Plan:  Home sleep study  Continue current medicines  Fit test  She will schedule her mammogram  Offered colonoscopy and she declines  Do not drive while sleepy   Follow-up in 6 months fasting or p.r.n. \"Healthy Family Handout\" provided  Avoid exposure to all tobacco products.   Read and consider all information provided by the pharmacy regarding prescribed medications before use  Careful medical compliance  Proper diet and weight management   Otherwise continue current treatment plan  Call or return if symptoms are not well controlled  Go to ED if severe/significant symptoms occur    All medical conditions for this patient are stable unless otherwise indicated    Maura Gonzalez MD    This note was transcribed using a voice recognition software system. Proper technique and careful oversight were used to increase transcription accuracy but inadvertent errors may be present.

## 2019-05-10 NOTE — PATIENT INSTRUCTIONS
Please read the healthy family handout that you were given and share it with your family. Please compare this printed medication list with your medications at home to be sure they are the same. If you have any medications that are different please contact us immediately at [phone]. Also review your allergies that we have listed, these may also include medications that you have not been able to tolerate, make sure everything listed is correct. If you have any allergies that are different please contact us immediately at [phone].

## 2019-06-24 DIAGNOSIS — G47.30 SLEEP APNEA, UNSPECIFIED TYPE: ICD-10-CM

## 2019-06-25 PROBLEM — G47.33 OSA (OBSTRUCTIVE SLEEP APNEA): Status: ACTIVE | Noted: 2019-06-25

## 2019-07-08 RX ORDER — ROSUVASTATIN CALCIUM 20 MG/1
TABLET, COATED ORAL
Qty: 30 TABLET | Refills: 3 | Status: SHIPPED | OUTPATIENT
Start: 2019-07-08 | End: 2019-07-10 | Stop reason: SDUPTHER

## 2019-07-08 NOTE — TELEPHONE ENCOUNTER
Pt called the office back and is scheduled with HERNANDO at [facility] on 7-10-19 at [phone]. Please refill pt's medication to her [place]. Thank you.

## 2019-07-09 ENCOUNTER — NURSE ONLY (OUTPATIENT)
Dept: FAMILY MEDICINE CLINIC | Age: 59
End: 2019-07-09
Payer: COMMERCIAL

## 2019-07-09 DIAGNOSIS — Z12.11 COLON CANCER SCREENING: ICD-10-CM

## 2019-07-09 LAB
CONTROL: NORMAL
HEMOCCULT STL QL: NEGATIVE

## 2019-07-09 PROCEDURE — 82274 ASSAY TEST FOR BLOOD FECAL: CPT | Performed by: FAMILY MEDICINE

## 2019-07-10 ENCOUNTER — OFFICE VISIT (OUTPATIENT)
Dept: CARDIOLOGY CLINIC | Age: 59
End: 2019-07-10
Payer: COMMERCIAL

## 2019-07-10 VITALS
HEART RATE: 90 BPM | HEIGHT: 63 IN | BODY MASS INDEX: 30.83 KG/M2 | OXYGEN SATURATION: 92 % | SYSTOLIC BLOOD PRESSURE: 120 MMHG | WEIGHT: 174 LBS | DIASTOLIC BLOOD PRESSURE: 66 MMHG

## 2019-07-10 DIAGNOSIS — Z72.0 TOBACCO ABUSE: ICD-10-CM

## 2019-07-10 DIAGNOSIS — E78.00 HYPERCHOLESTEREMIA: ICD-10-CM

## 2019-07-10 DIAGNOSIS — I25.110 CORONARY ARTERY DISEASE INVOLVING NATIVE CORONARY ARTERY OF NATIVE HEART WITH UNSTABLE ANGINA PECTORIS (HCC): Primary | ICD-10-CM

## 2019-07-10 PROCEDURE — 99213 OFFICE O/P EST LOW 20 MIN: CPT | Performed by: NURSE PRACTITIONER

## 2019-07-10 RX ORDER — ROSUVASTATIN CALCIUM 20 MG/1
20 TABLET, COATED ORAL DAILY
Qty: 90 TABLET | Refills: 3 | Status: SHIPPED | OUTPATIENT
Start: 2019-07-10 | End: 2021-01-28 | Stop reason: SDUPTHER

## 2019-07-10 RX ORDER — CLOPIDOGREL BISULFATE 75 MG/1
75 TABLET ORAL DAILY
Qty: 90 TABLET | Refills: 3 | Status: SHIPPED | OUTPATIENT
Start: 2019-07-10 | End: 2021-01-28 | Stop reason: SDUPTHER

## 2019-07-10 ASSESSMENT — ENCOUNTER SYMPTOMS
COUGH: 1
SHORTNESS OF BREATH: 1

## 2019-07-10 NOTE — PROGRESS NOTES
light-headedness. Psychiatric/Behavioral: Positive for sleep disturbance. Physical Examination:    Vitals:    07/10/19 0910   BP: 120/66   Pulse: 90   SpO2: 92%   Weight: 174 lb (78.9 kg)   Height: 5' 3\" (1.6 m)        Constitutional and General Appearance: Warm and dry, no apparent distress, normal coloration  HEENT:  Normocephalic, atraumatic  Respiratory:  · Normal excursion and expansion without use of accessory muscles  · Resp Auscultation: Clear to auscultation   Cardiovascular:  · The apical impulses not displaced  · Heart tones are crisp and normal  · JVP 8 cm H2O  · Regular rate and rhythm,  Normal S1S2, no m/g/r  · Peripheral pulses are symmetrical and full  · There is no clubbing, cyanosis of the extremities.   · 1+ RLE pretibial edema, trace to LLE  · Pedal Pulses: 2+ and equal   Abdomen:  · No masses or tenderness  · Liver/Spleen: No Abnormalities Noted  Neurological/Psychiatric:  · Alert and oriented in all spheres  · Moves all extremities well  · Exhibits normal gait balance and coordination  · No abnormalities of mood, affect, memory, mentation, or behavior are noted    Lab Data:  Most recent lab results below reviewed in office    CBC:   Lab Results   Component Value Date    WBC 9.5 12/05/2017    WBC 9.1 12/04/2017    WBC 8.4 05/11/2017    RBC 4.83 12/05/2017    RBC 5.41 12/04/2017    RBC 5.35 05/11/2017    HGB 14.7 12/05/2017    HGB 16.3 12/04/2017    HGB 16.1 05/11/2017    HCT 44.3 12/05/2017    HCT 49.4 12/04/2017    HCT 49.7 05/11/2017    MCV 91.8 12/05/2017    MCV 91.3 12/04/2017    MCV 93.0 05/11/2017    RDW 15.1 12/05/2017    RDW 15.1 12/04/2017    RDW 14.4 05/11/2017     12/05/2017     12/04/2017     05/11/2017     BMP:  Lab Results   Component Value Date     09/28/2018     03/14/2018     12/05/2017    K 4.5 09/28/2018    K 4.5 03/14/2018    K 4.3 12/05/2017     09/28/2018     03/14/2018     12/05/2017    CO2 26 09/28/2018 CO2 25 03/14/2018    CO2 24 12/05/2017    BUN 7 09/28/2018    BUN 11 03/14/2018    BUN 11 12/05/2017    CREATININE 0.7 09/28/2018    CREATININE 0.7 03/14/2018    CREATININE 0.7 12/05/2017     BNP: No results found for: PROBNP  LIPID:   Lab Results   Component Value Date    TRIG 119 09/28/2018    TRIG 85 03/14/2018    HDL 47 09/28/2018    HDL 50 03/14/2018    HDL 42 01/21/2011    HDL 43 10/21/2010    LDLCALC 68 09/28/2018    LDLCALC 66 03/14/2018    LDLDIRECT 239 06/17/2010       Cardiac Imaging:  LEFT HEART CATH 12/4/17:   LM: luminals  LAD: mid section of disease with a very tight focal 90% in setting of surrounding 70% calcified lesion  LCX: 20% ostial              OM1- ostial 20%              OM2- patent mid stent  RCA: dominant, mid RCA     LVEDP:20  LVEF: 65%     PCI of mid LAD  Stent: Xience alpine 2.5 x 18mm post dilated to 2.64mm   Assessment  1. Successful PCI to mid LAD using xience drug stent              90%--> 0%  2. ASA 81mg qday for life, plavix 75mg po qday for 1 yr w/o inturruption  3. BB, statin      Limited Echo: 12/30/2014  Last echo on 3/8/14 showed EF 45-50% with inferior wall hypokinesis. Compared to previous echo, there has been some slight improvement in wall motion. Normal left ventricle size, wall thickness and systolic function with an  estimated ejection fraction of 55-60%. Mild inferolateral and apical  inferior hypokinesis. Diastolic filling parameters suggests normal diastolic  function. Stress Test: 2/27/14  Moderate sized focus of stress induced myocardial ischemia involving the inferior wall of the left ventricle at the mid heart. ECG correlation is recommended. 2.  LVEF 62% + breast attentuation     CATH 3/7/2014  [facility] SUMMARY   ~LM normal   ~LAD Mid 80%   ~Cx Mid 70% focal   ~RCA Mid 100%, L->R collaterals   ~LVG 45% with inferior hk   Impression   ~Angiography w/ multivessel disease   ~LVEDP 18   ~LVG with depressed EF and inferior hypokinesis   Intervention   ~PCI of

## 2019-07-10 NOTE — PATIENT INSTRUCTIONS
smoking cigarettes. She has a 10.00 pack-year smoking history. She has never used smokeless tobacco. She reports that she does not drink alcohol or use drugs. Family History:   Family History   Problem Relation Age of Onset    Heart Attack Mother     Heart Disease Mother     Diabetes Mother     Heart Failure Father     Heart Disease Father        Home Medications:  Prior to Admission medications    Medication Sig Start Date End Date Taking? Authorizing Provider   rosuvastatin (CRESTOR) 20 MG tablet TAKE ONE TABLET BY MOUTH DAILY 7/8/19  Yes MARY Santana CNP   clopidogrel (PLAVIX) 75 MG tablet TAKE ONE TABLET BY MOUTH DAILY 1/7/19  Yes MARY Santana CNP   fluticasone-vilanterol (BREO ELLIPTA) 100-25 MCG/INH AEPB inhaler Inhale 1 puff into the lungs daily 9/28/18  Yes Natalie Maravilla MD   betamethasone valerate (VALISONE) 0.1 % cream APPLY TOPICALLY TWO TIMES A DAY AS NEEDED 8/10/18  Yes Natalie Maravilla MD   nitroGLYCERIN (NITROSTAT) 0.4 MG SL tablet Place 1 tablet under the tongue every 5 minutes as needed for Chest pain 3/28/18  Yes Mariluz Mckinley MD   fluticasone-salmeterol (ADVAIR DISKUS) 500-50 MCG/DOSE diskus inhaler Inhale 1 puff into the lungs every 12 hours 3/14/18  Yes Natalie Maravilla MD   aspirin 81 MG chewable tablet Take 1 tablet by mouth daily. 3/27/14  Yes MARY Knapp CNP   albuterol (PROVENTIL HFA;VENTOLIN HFA) 108 (90 BASE) MCG/ACT inhaler Inhale 2 puffs into the lungs every 6 hours as needed. 8/3/13  Yes Natalie Maravilla MD        Allergies:  Atenolol     Review of Systems:   Review of Systems   Constitutional: Negative for activity change and appetite change. HENT: Positive for postnasal drip. Respiratory: Positive for cough and shortness of breath. Cardiovascular: Positive for palpitations and leg swelling. Allergic/Immunologic: Positive for environmental allergies. Neurological: Positive for dizziness.  Negative for syncope and light-headedness. Psychiatric/Behavioral: Positive for sleep disturbance. Physical Examination:    Vitals:    07/10/19 0910   BP: 120/66   Pulse: 90   SpO2: 92%   Weight: 174 lb (78.9 kg)   Height: 5' 3\" (1.6 m)        Constitutional and General Appearance: Warm and dry, no apparent distress, normal coloration  HEENT:  Normocephalic, atraumatic  Respiratory:  · Normal excursion and expansion without use of accessory muscles  · Resp Auscultation: Clear to auscultation   Cardiovascular:  · The apical impulses not displaced  · Heart tones are crisp and normal  · JVP 8 cm H2O  · Regular rate and rhythm,  Normal S1S2, no m/g/r  · Peripheral pulses are symmetrical and full  · There is no clubbing, cyanosis of the extremities.   · 1+ RLE pretibial edema, trace to LLE  · Pedal Pulses: 2+ and equal   Abdomen:  · No masses or tenderness  · Liver/Spleen: No Abnormalities Noted  Neurological/Psychiatric:  · Alert and oriented in all spheres  · Moves all extremities well  · Exhibits normal gait balance and coordination  · No abnormalities of mood, affect, memory, mentation, or behavior are noted    Lab Data:  Most recent lab results below reviewed in office    CBC:   Lab Results   Component Value Date    WBC 9.5 12/05/2017    WBC 9.1 12/04/2017    WBC 8.4 05/11/2017    RBC 4.83 12/05/2017    RBC 5.41 12/04/2017    RBC 5.35 05/11/2017    HGB 14.7 12/05/2017    HGB 16.3 12/04/2017    HGB 16.1 05/11/2017    HCT 44.3 12/05/2017    HCT 49.4 12/04/2017    HCT 49.7 05/11/2017    MCV 91.8 12/05/2017    MCV 91.3 12/04/2017    MCV 93.0 05/11/2017    RDW 15.1 12/05/2017    RDW 15.1 12/04/2017    RDW 14.4 05/11/2017     12/05/2017     12/04/2017     05/11/2017     BMP:  Lab Results   Component Value Date     09/28/2018     03/14/2018     12/05/2017    K 4.5 09/28/2018    K 4.5 03/14/2018    K 4.3 12/05/2017     09/28/2018     03/14/2018     12/05/2017    CO2 26 09/28/2018

## 2019-07-16 RX ORDER — FLUTICASONE FUROATE AND VILANTEROL TRIFENATATE 100; 25 UG/1; UG/1
POWDER RESPIRATORY (INHALATION)
Qty: 1 EACH | Refills: 1 | Status: SHIPPED | OUTPATIENT
Start: 2019-07-16 | End: 2019-09-18 | Stop reason: SDUPTHER

## 2019-09-05 ENCOUNTER — OFFICE VISIT (OUTPATIENT)
Dept: CARDIOLOGY CLINIC | Age: 59
End: 2019-09-05
Payer: COMMERCIAL

## 2019-09-05 VITALS
HEIGHT: 62 IN | SYSTOLIC BLOOD PRESSURE: 152 MMHG | HEART RATE: 91 BPM | BODY MASS INDEX: 31.94 KG/M2 | WEIGHT: 173.6 LBS | DIASTOLIC BLOOD PRESSURE: 80 MMHG | OXYGEN SATURATION: 92 %

## 2019-09-05 DIAGNOSIS — Z72.0 TOBACCO ABUSE: ICD-10-CM

## 2019-09-05 DIAGNOSIS — I10 ESSENTIAL HYPERTENSION: ICD-10-CM

## 2019-09-05 DIAGNOSIS — E78.00 HYPERCHOLESTEREMIA: ICD-10-CM

## 2019-09-05 DIAGNOSIS — G47.33 OSA (OBSTRUCTIVE SLEEP APNEA): ICD-10-CM

## 2019-09-05 DIAGNOSIS — I25.110 CORONARY ARTERY DISEASE INVOLVING NATIVE CORONARY ARTERY OF NATIVE HEART WITH UNSTABLE ANGINA PECTORIS (HCC): Primary | ICD-10-CM

## 2019-09-05 PROCEDURE — 93000 ELECTROCARDIOGRAM COMPLETE: CPT | Performed by: NURSE PRACTITIONER

## 2019-09-05 PROCEDURE — 99214 OFFICE O/P EST MOD 30 MIN: CPT | Performed by: NURSE PRACTITIONER

## 2019-09-05 RX ORDER — NITROGLYCERIN 0.4 MG/1
0.4 TABLET SUBLINGUAL EVERY 5 MIN PRN
Qty: 25 TABLET | Refills: 1 | Status: SHIPPED | OUTPATIENT
Start: 2019-09-05 | End: 2021-01-28 | Stop reason: SDUPTHER

## 2019-09-05 RX ORDER — ISOSORBIDE MONONITRATE 30 MG/1
30 TABLET, EXTENDED RELEASE ORAL DAILY
Qty: 30 TABLET | Refills: 3 | Status: SHIPPED | OUTPATIENT
Start: 2019-09-05 | End: 2019-10-17 | Stop reason: ALTCHOICE

## 2019-09-05 ASSESSMENT — ENCOUNTER SYMPTOMS: SHORTNESS OF BREATH: 1

## 2019-09-05 NOTE — PATIENT INSTRUCTIONS
1. Schedule stress test  2. Start isosorbide (Imdur) 30 mg once daily  3. No change in other heart medicines  4.  Follow up with me in ~ 3 weeks

## 2019-09-05 NOTE — LETTER
[address]   Cardiac Evaluation    Primary Care Doctor: Elida Tristan MD    Chief Complaint   Patient presents with    1 Month Follow-Up     possible blockages        History of Present Illness:   I had the pleasure of seeing Eloise Adamson in follow up for for CAD s/p PCI to RCA (2013), LCx (2014) and LAD (2017), HTN, HLD as well as Asthma, RYLIE and smoker. She is here as an urgent visit for angina. She reports increased fatigue, not really chest pain, + palpitations on and off past 3 weeks. Just feels like something is not right, feels like prior angina/ symptoms prior to last PCI. She complains of headache since yesterday. Breathing is a bit worse, gets out of breath faster with activity. Still working 4 am to 1 pm at [employer], doing well with this, less stress on her. She has not taken any nitroglycerin. She had a racing heart beat at 2 am last night, was already awake. She still smokes 1/2 ppd, hesitant to stop completely. Eloise Adamson describes symptoms including dyspnea, palpitations, fatigue but denies chest pain, orthopnea, PND, early saiety, edema, syncope. NYHA:   II  ACC/ AHA Stage:    C    Past Medical History:   has a past medical history of Asthma, CAD (coronary artery disease), Depression, HTN, Hyperglycemia, and Hyperlipidemia. Surgical History:   has a past surgical history that includes Breast biopsy; Coronary angioplasty with stent (03/07/2014); Coronary angioplasty with stent (03/26/2014); and Coronary angioplasty with stent (12/04/2017). Social History:   reports that she has been smoking cigarettes. She has a 10.00 pack-year smoking history. She has never used smokeless tobacco. She reports that she does not drink alcohol or use drugs.    Family History:   Family History   Problem Relation Age of Onset    Heart Attack Mother     Heart Disease Mother     Diabetes Mother     Heart Failure Father     Heart Disease Father Home Medications:  Prior to Admission medications    Medication Sig Start Date End Date Taking? Authorizing Provider   OLLIE ELLIPTA 100-25 MCG/INH AEPB inhaler INHALE 1 PUFF BY MOUTH ONCE DAILY 7/16/19  Yes Collette Navarrete MD   clopidogrel (PLAVIX) 75 MG tablet Take 1 tablet by mouth daily 7/10/19  Yes MARY Edwards CNP   rosuvastatin (CRESTOR) 20 MG tablet Take 1 tablet by mouth daily 7/10/19  Yes MARY Edwards CNP   betamethasone valerate (VALISONE) 0.1 % cream APPLY TOPICALLY TWO TIMES A DAY AS NEEDED 8/10/18  Yes Collette Navarrete MD   nitroGLYCERIN (NITROSTAT) 0.4 MG SL tablet Place 1 tablet under the tongue every 5 minutes as needed for Chest pain 3/28/18  Yes Tez Maldonado MD   fluticasone-salmeterol (ADVAIR DISKUS) 500-50 MCG/DOSE diskus inhaler Inhale 1 puff into the lungs every 12 hours 3/14/18  Yes Collette Navarrete MD   aspirin 81 MG chewable tablet Take 1 tablet by mouth daily. 3/27/14  Yes MARY Carpio CNP   albuterol (PROVENTIL HFA;VENTOLIN HFA) 108 (90 BASE) MCG/ACT inhaler Inhale 2 puffs into the lungs every 6 hours as needed. 8/3/13  Yes Collette Navarrete MD        Allergies:  Atenolol     Review of Systems:   Review of Systems   Constitutional: Positive for fatigue. Negative for activity change and appetite change. Respiratory: Positive for shortness of breath. Cardiovascular: Positive for palpitations. Neurological: Negative for dizziness, syncope and light-headedness. Psychiatric/Behavioral: Positive for sleep disturbance.         Physical Examination:    Vitals:    09/05/19 1011 09/05/19 1012   BP: (!) 160/78 (!) 152/80   Pulse: 91    SpO2: 92%    Weight: 173 lb 9.6 oz (78.7 kg)    Height: 5' 2\" (1.575 m)         Constitutional and General Appearance: Warm and dry, no apparent distress, normal coloration  HEENT:  Normocephalic, atraumatic  Respiratory:  · Normal excursion and expansion without use of accessory muscles

## 2019-09-05 NOTE — PROGRESS NOTES
ramus that had very mild disease throughout its course. Just   after this, there was a very short focal segment of 70% to 75% stenosis in   the mid circumflex. This was the lesion that was intervened upon at this   time. 4. The right coronary artery was noted to be dominant. It had a patent   stent that was present in the mid RCA with no evidence of complication. ASSESSMENT: Successful percutaneous intervention to the mid left circumflex   using Xience Xpedition 2.25-mm x 8-mm. The 75% stenosis was reduced to 0%   with no complication and maintenance of ELIGIO 3 flow. Assessment:    1. Coronary artery disease involving native coronary artery of native heart with unstable angina pectoris ([address]    2. Hypercholesteremia    3. Essential hypertension    4. Tobacco abuse    5. RYLIE (obstructive sleep apnea)      ECG = NSR, no ischemia    Plan:   1. Schedule stress test  2. Start isosorbide (Imdur) 30 mg once daily  3. No change in other heart medicines  4. Follow up with me in ~ 3 weeks      I appreciate the opportunity of cooperating in the care of this individual.    MARY Carrillo - CNP, 9/5/2019, 10:31 AM       QUALITY MEASURES  1. Tobacco Cessation Counseling: Yes  2. Retake of BP if >140/90:   Yes  3. Documentation to PCP/referring for new patient:  Sent to PCP at close of office visit  4. CAD patient on anti-platelet: Yes  5. CAD patient on STATIN therapy:  Yes  6.  Patient with CHF and aFib on anticoagulation:  NA

## 2019-09-13 ENCOUNTER — HOSPITAL ENCOUNTER (OUTPATIENT)
Dept: NUCLEAR MEDICINE | Age: 59
Discharge: HOME | End: 2019-09-13
Payer: COMMERCIAL

## 2019-09-13 ENCOUNTER — HOSPITAL ENCOUNTER (OUTPATIENT)
Dept: NON INVASIVE DIAGNOSTICS | Age: 59
Discharge: HOME | End: 2019-09-13
Payer: COMMERCIAL

## 2019-09-13 VITALS — HEIGHT: 62 IN | WEIGHT: 173 LBS | BODY MASS INDEX: 31.83 KG/M2

## 2019-09-13 DIAGNOSIS — E78.00 HYPERCHOLESTEREMIA: ICD-10-CM

## 2019-09-13 DIAGNOSIS — G47.33 OSA (OBSTRUCTIVE SLEEP APNEA): ICD-10-CM

## 2019-09-13 DIAGNOSIS — Z72.0 TOBACCO ABUSE: ICD-10-CM

## 2019-09-13 DIAGNOSIS — I10 ESSENTIAL HYPERTENSION: ICD-10-CM

## 2019-09-13 DIAGNOSIS — I25.110 CORONARY ARTERY DISEASE INVOLVING NATIVE CORONARY ARTERY OF NATIVE HEART WITH UNSTABLE ANGINA PECTORIS (HCC): ICD-10-CM

## 2019-09-13 LAB
LV EF: 60 %
LVEF MODALITY: NORMAL

## 2019-09-13 PROCEDURE — A9502 TC99M TETROFOSMIN: HCPCS | Performed by: NURSE PRACTITIONER

## 2019-09-13 PROCEDURE — 78452 HT MUSCLE IMAGE SPECT MULT: CPT

## 2019-09-13 PROCEDURE — 93017 CV STRESS TEST TRACING ONLY: CPT

## 2019-09-13 PROCEDURE — 6360000002 HC RX W HCPCS: Performed by: NURSE PRACTITIONER

## 2019-09-13 PROCEDURE — 3430000000 HC RX DIAGNOSTIC RADIOPHARMACEUTICAL: Performed by: NURSE PRACTITIONER

## 2019-09-13 RX ADMIN — TETROFOSMIN 10.8 MILLICURIE: 1.38 INJECTION, POWDER, LYOPHILIZED, FOR SOLUTION INTRAVENOUS at 07:49

## 2019-09-13 RX ADMIN — TETROFOSMIN 33.4 MILLICURIE: 1.38 INJECTION, POWDER, LYOPHILIZED, FOR SOLUTION INTRAVENOUS at 08:24

## 2019-09-13 RX ADMIN — REGADENOSON 0.4 MG: 0.08 INJECTION, SOLUTION INTRAVENOUS at 08:24

## 2019-09-16 ENCOUNTER — TELEPHONE (OUTPATIENT)
Dept: CARDIOLOGY CLINIC | Age: 59
End: 2019-09-16

## 2019-09-19 RX ORDER — FLUTICASONE FUROATE AND VILANTEROL TRIFENATATE 100; 25 UG/1; UG/1
POWDER RESPIRATORY (INHALATION)
Qty: 1 EACH | Refills: 3 | Status: SHIPPED | OUTPATIENT
Start: 2019-09-19 | End: 2020-02-04

## 2019-09-19 NOTE — TELEPHONE ENCOUNTER
I am covering for [employer] while she is out of town. The Imdur was prescribed for her chest pain. Is she currently having any? See [employer] last note below she did want her to follow up, if she has questions about the Imdur and wants to speak specifically to Gustavo Mary can we please schedule appointment. Plan:   1. Schedule stress test  2. Start isosorbide (Imdur) 30 mg once daily  3.  No change in other heart medicines  Follow up with me in ~ 3 weeks

## 2019-10-17 ENCOUNTER — OFFICE VISIT (OUTPATIENT)
Dept: CARDIOLOGY CLINIC | Age: 59
End: 2019-10-17
Payer: COMMERCIAL

## 2019-10-17 VITALS
HEART RATE: 91 BPM | WEIGHT: 175.2 LBS | BODY MASS INDEX: 32.24 KG/M2 | HEIGHT: 62 IN | DIASTOLIC BLOOD PRESSURE: 82 MMHG | SYSTOLIC BLOOD PRESSURE: 139 MMHG | OXYGEN SATURATION: 90 %

## 2019-10-17 DIAGNOSIS — I25.110 CORONARY ARTERY DISEASE INVOLVING NATIVE CORONARY ARTERY OF NATIVE HEART WITH UNSTABLE ANGINA PECTORIS (HCC): Primary | ICD-10-CM

## 2019-10-17 DIAGNOSIS — I10 ESSENTIAL HYPERTENSION: ICD-10-CM

## 2019-10-17 DIAGNOSIS — E78.2 MIXED HYPERLIPIDEMIA: ICD-10-CM

## 2019-10-17 DIAGNOSIS — Z72.0 TOBACCO ABUSE: ICD-10-CM

## 2019-10-17 PROCEDURE — 99213 OFFICE O/P EST LOW 20 MIN: CPT | Performed by: INTERNAL MEDICINE

## 2019-11-25 ENCOUNTER — OFFICE VISIT (OUTPATIENT)
Dept: FAMILY MEDICINE CLINIC | Age: 59
End: 2019-11-25
Payer: COMMERCIAL

## 2019-11-25 VITALS
OXYGEN SATURATION: 90 % | BODY MASS INDEX: 32.67 KG/M2 | WEIGHT: 178.6 LBS | TEMPERATURE: 98.3 F | HEART RATE: 108 BPM | DIASTOLIC BLOOD PRESSURE: 80 MMHG | SYSTOLIC BLOOD PRESSURE: 122 MMHG

## 2019-11-25 DIAGNOSIS — J01.90 ACUTE BACTERIAL SINUSITIS: Primary | ICD-10-CM

## 2019-11-25 DIAGNOSIS — B96.89 ACUTE BACTERIAL SINUSITIS: Primary | ICD-10-CM

## 2019-11-25 DIAGNOSIS — J40 BRONCHITIS: ICD-10-CM

## 2019-11-25 PROCEDURE — 99213 OFFICE O/P EST LOW 20 MIN: CPT | Performed by: NURSE PRACTITIONER

## 2019-11-25 RX ORDER — PREDNISONE 20 MG/1
20 TABLET ORAL DAILY
Qty: 10 TABLET | Refills: 0 | Status: SHIPPED | OUTPATIENT
Start: 2019-11-25 | End: 2019-12-05

## 2019-11-25 RX ORDER — DOXYCYCLINE HYCLATE 100 MG
100 TABLET ORAL 2 TIMES DAILY
Qty: 20 TABLET | Refills: 0 | Status: SHIPPED | OUTPATIENT
Start: 2019-11-25 | End: 2019-12-05

## 2019-11-25 ASSESSMENT — ENCOUNTER SYMPTOMS
WHEEZING: 1
EYES NEGATIVE: 1
COUGH: 1
SINUS PRESSURE: 1
SHORTNESS OF BREATH: 1
SINUS PAIN: 1
GASTROINTESTINAL NEGATIVE: 1

## 2019-11-29 ENCOUNTER — TELEPHONE (OUTPATIENT)
Dept: FAMILY MEDICINE CLINIC | Age: 59
End: 2019-11-29

## 2020-02-04 RX ORDER — FLUTICASONE FUROATE AND VILANTEROL TRIFENATATE 100; 25 UG/1; UG/1
POWDER RESPIRATORY (INHALATION)
Qty: 60 EACH | Refills: 0 | Status: SHIPPED | OUTPATIENT
Start: 2020-02-04 | End: 2020-05-07

## 2020-03-10 RX ORDER — BUDESONIDE AND FORMOTEROL FUMARATE DIHYDRATE 160; 4.5 UG/1; UG/1
2 AEROSOL RESPIRATORY (INHALATION) 2 TIMES DAILY
Qty: 1 INHALER | Refills: 5 | Status: SHIPPED | OUTPATIENT
Start: 2020-03-10 | End: 2021-08-24

## 2020-03-10 NOTE — TELEPHONE ENCOUNTER
Patient requesting to have script sent in for Symbicort 160/45 she said she was given sample of this the last time she was here and it is working for her

## 2020-04-09 RX ORDER — FLUTICASONE FUROATE AND VILANTEROL TRIFENATATE 100; 25 UG/1; UG/1
POWDER RESPIRATORY (INHALATION)
Qty: 60 EACH | Refills: 0 | OUTPATIENT
Start: 2020-04-09

## 2020-04-29 ENCOUNTER — TELEPHONE (OUTPATIENT)
Dept: FAMILY MEDICINE CLINIC | Age: 60
End: 2020-04-29

## 2020-05-07 RX ORDER — FLUTICASONE FUROATE AND VILANTEROL TRIFENATATE 100; 25 UG/1; UG/1
POWDER RESPIRATORY (INHALATION)
Qty: 60 EACH | Refills: 2 | Status: SHIPPED | OUTPATIENT
Start: 2020-05-07 | End: 2021-08-24

## 2020-07-29 ENCOUNTER — TELEPHONE (OUTPATIENT)
Dept: FAMILY MEDICINE CLINIC | Age: 60
End: 2020-07-29

## 2020-07-29 NOTE — TELEPHONE ENCOUNTER
----- Message from Thomas Ovalle sent at 7/29/2020 10:25 AM EDT -----  Subject: Message to Provider    QUESTIONS  Information for Provider? will be having papers faxed to office   ---------------------------------------------------------------------------  --------------  [address]  What is the best way for the office to contact you? OK to leave message on   voicemail  Preferred Call Back Phone Number? [phone]  ---------------------------------------------------------------------------  --------------  SCRIPT ANSWERS  Relationship to Patient?  Self

## 2020-08-04 ENCOUNTER — OFFICE VISIT (OUTPATIENT)
Dept: FAMILY MEDICINE CLINIC | Age: 60
End: 2020-08-04
Payer: COMMERCIAL

## 2020-08-04 VITALS
WEIGHT: 183 LBS | DIASTOLIC BLOOD PRESSURE: 78 MMHG | HEART RATE: 93 BPM | BODY MASS INDEX: 33.47 KG/M2 | OXYGEN SATURATION: 88 % | SYSTOLIC BLOOD PRESSURE: 139 MMHG | TEMPERATURE: 98.1 F

## 2020-08-04 PROBLEM — J43.1 PANLOBULAR EMPHYSEMA (HCC): Status: ACTIVE | Noted: 2020-08-04

## 2020-08-04 LAB
ALT SERPL-CCNC: 17 U/L (ref 10–40)
ANION GAP SERPL CALCULATED.3IONS-SCNC: 13 MMOL/L (ref 3–16)
BUN BLDV-MCNC: 10 MG/DL (ref 7–20)
CALCIUM SERPL-MCNC: 9.3 MG/DL (ref 8.3–10.6)
CHLORIDE BLD-SCNC: 102 MMOL/L (ref 99–110)
CHOLESTEROL, TOTAL: 152 MG/DL (ref 0–199)
CO2: 26 MMOL/L (ref 21–32)
CREAT SERPL-MCNC: 0.8 MG/DL (ref 0.6–1.1)
GFR AFRICAN AMERICAN: >60
GFR NON-AFRICAN AMERICAN: >60
GLUCOSE BLD-MCNC: 115 MG/DL (ref 70–99)
HDLC SERPL-MCNC: 44 MG/DL (ref 40–60)
LDL CHOLESTEROL CALCULATED: 85 MG/DL
POTASSIUM SERPL-SCNC: 4.7 MMOL/L (ref 3.5–5.1)
SODIUM BLD-SCNC: 141 MMOL/L (ref 136–145)
TRIGL SERPL-MCNC: 114 MG/DL (ref 0–150)
VLDLC SERPL CALC-MCNC: 23 MG/DL

## 2020-08-04 PROCEDURE — 99214 OFFICE O/P EST MOD 30 MIN: CPT | Performed by: FAMILY MEDICINE

## 2020-08-04 RX ORDER — DOXYCYCLINE HYCLATE 100 MG
100 TABLET ORAL 2 TIMES DAILY
Qty: 20 TABLET | Refills: 0 | Status: SHIPPED | OUTPATIENT
Start: 2020-08-04 | End: 2020-08-14

## 2020-08-04 RX ORDER — PREDNISONE 20 MG/1
40 TABLET ORAL DAILY
Qty: 20 TABLET | Refills: 0 | Status: SHIPPED | OUTPATIENT
Start: 2020-08-04 | End: 2020-08-14

## 2020-08-04 NOTE — PROGRESS NOTES
Subjective: Shruthi Guajardo is here to discuss the following issues. She has coronary artery disease and had no recent chest pain or chest pressure. She saw her cardiologist last fall and she was felt to be doing well. She has chronic right upper quadrant abdominal pain. She will have some mild nausea but no vomiting no constipation diarrhea. No urinary frequency or urgency. No fever sweats or chills. She has hyperglycemia without polydipsia or polyuria. She has a history of asthma and emphysema and continues to smoke. She is using Breo. She cannot afford rescue inhalers. She requests a nebulizer. No increased shortness of breath or wheezing but she does have some anterior chest congestion and cough. She has some occasional thick yellow mucus production. She has essential hypertension and her blood pressures have been well controlled. She has obstructive sleep apnea but has not been compliant with testing and treatment. Social History     Tobacco Use   Smoking Status Current Every Day Smoker    Packs/day: 0.50    Years: 20.00    Pack years: 10.00    Types: Cigarettes   Smokeless Tobacco Never Used   Allergies:     Atenolol    Objective:  /78   Pulse 93   Temp 98.1 °F (36.7 °C) (Temporal)   Wt 183 lb (83 kg)   SpO2 (!) 88%   BMI 33.47 kg/m²    No acute distress, heart regular rate and rhythm without murmur, lungs clear to auscultation easy effort, abdomen soft nondistended, no clubbing or cyanosis no right upper quadrant tenderness morbidly obese ears clear throat clear no lymphadenopathy    Assessment:  1. Coronary artery disease involving native coronary artery of native heart with unstable angina pectoris ([address]    2. RUQ abdominal pain    3. Hyperglycemia    4. Mild persistent asthma without complication    5. Panlobular emphysema ([address]    6. Essential hypertension    7. Hypoxia    8. Abnormal mammogram    9. Screening mammogram, encounter for    10. Colon cancer screening    11. RYLIE (obstructive sleep apnea)    12     acute bronchitis        Plan:  Labs ordered  Right upper quadrant ultrasound  Prescription for Trelegy. Stop Breo and other similar inhalers  Nebulizer machine with albuterol liquid as needed  She declines rescue inhaler due to expense  Offered pulmonology referral due to hypoxemia. She declines. She agrees to return in 1 week for repeat oxygen test  Prednisone  Doxycycline  Fit test  Mammogram  Her son Mary Zhou is expecting twins in December  Follow-up in 6 months or as needed  \"Healthy Family Handout\" provided  Avoid exposure to all tobacco products. Read and consider all information provided by the pharmacy regarding prescribed medications before use  Careful medical compliance  Proper diet and weight management   Otherwise continue current treatment plan  Call or return if symptoms are not well controlled  Go to ED if severe/significant symptoms occur    All medical conditions for this patient are stable unless otherwise indicated    Lenard Wiggins MD    This note was transcribed using a voice recognition software system. Proper technique and careful oversight were used to increase transcription accuracy but inadvertent errors may be present.

## 2020-08-10 ENCOUNTER — HOSPITAL ENCOUNTER (EMERGENCY)
Age: 60
Discharge: HOME | End: 2020-08-10
Attending: STUDENT IN AN ORGANIZED HEALTH CARE EDUCATION/TRAINING PROGRAM
Payer: COMMERCIAL

## 2020-08-10 ENCOUNTER — APPOINTMENT (OUTPATIENT)
Dept: GENERAL RADIOLOGY | Age: 60
End: 2020-08-10
Payer: COMMERCIAL

## 2020-08-10 VITALS
HEIGHT: 62 IN | SYSTOLIC BLOOD PRESSURE: 149 MMHG | DIASTOLIC BLOOD PRESSURE: 90 MMHG | HEART RATE: 88 BPM | TEMPERATURE: 98.2 F | BODY MASS INDEX: 33.68 KG/M2 | OXYGEN SATURATION: 91 % | RESPIRATION RATE: 14 BRPM | WEIGHT: 183 LBS

## 2020-08-10 PROCEDURE — 6360000002 HC RX W HCPCS: Performed by: STUDENT IN AN ORGANIZED HEALTH CARE EDUCATION/TRAINING PROGRAM

## 2020-08-10 PROCEDURE — 99283 EMERGENCY DEPT VISIT LOW MDM: CPT

## 2020-08-10 PROCEDURE — 96372 THER/PROPH/DIAG INJ SC/IM: CPT

## 2020-08-10 PROCEDURE — 73030 X-RAY EXAM OF SHOULDER: CPT

## 2020-08-10 PROCEDURE — 71045 X-RAY EXAM CHEST 1 VIEW: CPT

## 2020-08-10 RX ORDER — DEXAMETHASONE SODIUM PHOSPHATE 10 MG/ML
10 INJECTION, SOLUTION INTRAMUSCULAR; INTRAVENOUS ONCE
Status: COMPLETED | OUTPATIENT
Start: 2020-08-10 | End: 2020-08-10

## 2020-08-10 RX ORDER — LIDOCAINE 50 MG/G
1 PATCH TOPICAL DAILY
Qty: 10 PATCH | Refills: 0 | Status: SHIPPED | OUTPATIENT
Start: 2020-08-10 | End: 2020-08-20

## 2020-08-10 RX ORDER — KETOROLAC TROMETHAMINE 30 MG/ML
15 INJECTION, SOLUTION INTRAMUSCULAR; INTRAVENOUS ONCE
Status: DISCONTINUED | OUTPATIENT
Start: 2020-08-10 | End: 2020-08-10

## 2020-08-10 RX ORDER — OXYCODONE HYDROCHLORIDE 5 MG/1
5 TABLET ORAL ONCE
Status: DISCONTINUED | OUTPATIENT
Start: 2020-08-10 | End: 2020-08-10

## 2020-08-10 RX ORDER — KETOROLAC TROMETHAMINE 30 MG/ML
30 INJECTION, SOLUTION INTRAMUSCULAR; INTRAVENOUS ONCE
Status: COMPLETED | OUTPATIENT
Start: 2020-08-10 | End: 2020-08-10

## 2020-08-10 RX ADMIN — KETOROLAC TROMETHAMINE 30 MG: 30 INJECTION, SOLUTION INTRAMUSCULAR at 06:38

## 2020-08-10 RX ADMIN — DEXAMETHASONE SODIUM PHOSPHATE 10 MG: 10 INJECTION INTRAMUSCULAR; INTRAVENOUS at 06:37

## 2020-08-10 ASSESSMENT — PAIN SCALES - GENERAL
PAINLEVEL_OUTOF10: 7
PAINLEVEL_OUTOF10: 7

## 2020-08-10 ASSESSMENT — PAIN DESCRIPTION - DESCRIPTORS: DESCRIPTORS: ACHING

## 2020-08-10 ASSESSMENT — PAIN DESCRIPTION - LOCATION: LOCATION: SHOULDER

## 2020-08-10 ASSESSMENT — PAIN DESCRIPTION - ORIENTATION: ORIENTATION: LEFT;POSTERIOR

## 2020-08-10 ASSESSMENT — PAIN DESCRIPTION - DIRECTION: RADIATING_TOWARDS: SHOULDER BLADE

## 2020-08-10 ASSESSMENT — PAIN DESCRIPTION - PAIN TYPE: TYPE: ACUTE PAIN

## 2020-08-10 ASSESSMENT — PAIN DESCRIPTION - FREQUENCY: FREQUENCY: CONTINUOUS

## 2020-08-10 NOTE — ED NOTES
Patient being discharged home, discharge instructions printed and reviewed with patient, denies any questions. Patient ambulated off unit with no signs of distress.      Elmer Sinclair RN  08/10/20 [id number]

## 2020-08-10 NOTE — ED PROVIDER NOTES
Primary Care Physician: Neli Gonzalez MD   Attending Physician: No att. providers found     History   Chief Complaint   Patient presents with    Shoulder Pain     Pt comes in with c/o left posterior shoulder pain that radiates down to her left shoulder blade. Pt reports no injury and noticed it starting to get sore on Tuesday. She was at work this morning and lifting heavy cans of vegetables that seemed to aggravate it. Work sent her home and son told her to come here. HPI   Daniel Renae is a 64 y.o. female history of asthma, CAD, depression, hypertension, hyperlipidemia, presenting this morning complaining of left shoulder pain. States that pain started on Tuesday of last week almost 5 days ago when she had been doing heavy work in the garden. She said pain persisted and this morning when she woke went to work around 4 AM she was lifting objects and all of a sudden the pain got very severe causing her to come to the emergency room. Pain is worse when she lifts her head up but she denies chest pain, shortness of breath, fevers, chills, nausea or other symptoms associated with systemic infection. She states that she saw her PCP last week who started her on steroids for her wheezing from smoking.      Past Medical History:   Diagnosis Date    Asthma     CAD (coronary artery disease)     Depression     HTN     Hyperglycemia     Hyperlipidemia         Past Surgical History:   Procedure Laterality Date    BREAST BIOPSY      CORONARY ANGIOPLASTY WITH STENT PLACEMENT  03/07/2014    Xpedition CHARAN 2.25 x 8 mCX    CORONARY ANGIOPLASTY WITH STENT PLACEMENT  03/26/2014    Xpedition CHARAN 3.5 x 28 rPROX    CORONARY ANGIOPLASTY WITH STENT PLACEMENT  12/04/2017    Xience CHARAN 2.5 x 18 CHARAN mLAD        Family History   Problem Relation Age of Onset    Heart Attack Mother     Heart Disease Mother     Diabetes Mother     Heart Failure Father     Heart Disease Father         Social History Socioeconomic History    Marital status:      Spouse name: Dipak Veliz    Number of children: 1    Years of education: None    Highest education level: None   Occupational History    None   Social Needs    Financial resource strain: None    Food insecurity     Worry: None     Inability: None    Transportation needs     Medical: None     Non-medical: None   Tobacco Use    Smoking status: Current Every Day Smoker     Packs/day: 0.50     Years: 20.00     Pack years: 10.00     Types: Cigarettes    Smokeless tobacco: Never Used   Substance and Sexual Activity    Alcohol use: No    Drug use: No    Sexual activity: Yes     Partners: Male   Lifestyle    Physical activity     Days per week: None     Minutes per session: None    Stress: None   Relationships    Social connections     Talks on phone: None     Gets together: None     Attends Roman Catholic service: None     Active member of club or organization: None     Attends meetings of clubs or organizations: None     Relationship status: None    Intimate partner violence     Fear of current or ex partner: None     Emotionally abused: None     Physically abused: None     Forced sexual activity: None   Other Topics Concern    None   Social History Narrative    None        Review of Systems   10 total systems reviewed and found to be negative unless otherwise noted in HPI     Physical Exam   BP (!) 149/90   Pulse 88   Temp 98.2 °F (36.8 °C) (Oral)   Resp 14   Ht 5' 2\" (1.575 m)   Wt 183 lb (83 kg)   SpO2 91%   Breastfeeding No   BMI 33.47 kg/m²      CONSTITUTIONAL: Well appearing, in no acute distress   HEAD: atraumatic, normocephalic   EYES: PERRL, No injection, discharge or scleral icterus. ENT: Moist mucous membranes. NECK: Normal ROM, NO LAD   CARDIOVASCULAR: Regular rate and rhythm. No murmurs or gallop. PULMONARY/CHEST: Airway patent. No retractions. Breath sounds clear with good air entry bilaterally.    ABDOMEN: Soft, Non-distended and non-tender, without guarding or rebound. SKIN: Acyanotic, warm, dry   MUSCULOSKELETAL: But left shoulder tender to palpation limited range of motion secondary to pain  NEUROLOGICAL: Awake and oriented x 3. Pulses intact. Grossly nonfocal   Nursing note and vitals reviewed. ED Course & Medical Decision Making   Medications   dexamethasone (PF) (DECADRON) injection 10 mg (10 mg Intramuscular Given 8/10/20 0637)   ketorolac (TORADOL) injection 30 mg (30 mg Intramuscular Given 8/10/20 0638)      Labs Reviewed - No data to display   XR SHOULDER LEFT (MIN 2 VIEWS)   Final Result   No acute finding in the left shoulder. XR CHEST PORTABLE   Final Result   Mild perihilar opacities may represent vascular congestion or mild   interstitial change, stable from remote imaging. No acute airspace finding. PROCEDURES:   Procedures    ASSESSMENT AND PLAN:  Michael Lama is a 64 y.o. female presenting with left shoulder pain which got worse with lifting objects. On exam nontoxic in no acute distress but tenderness to palpation and limited range of motion left shoulder. At this point I was concerned for musculoskeletal injury. Did obtain an x-ray which showed no fracture. No indication for labs given no signs of infection. Patient was given a shot of Toradol and Decadron and discharged home with a referral to see orthopedic surgery to be evaluated for rotator cuff injury. She was discharged home in stable condition. She was prescribed lidocaine patch. ClINICAL IMPRESSION:  1.  Acute pain of left shoulder          PATIENT REFERRED TO:  Marck Dunaway MD  [address] [place] [address]. [facility] [address]    Schedule an appointment as soon as possible for a visit in 2 days        DISCHARGE MEDICATIONS:  Discharge Medication List as of 8/10/2020  7:16 AM      START taking these medications    Details   lidocaine (LIDODERM) 5 % Place 1 patch onto the skin daily for 10 days 12 hours on, 12 hours off., Disp-10 patch,R-0Print           DISCONTINUED MEDICATIONS:  Discharge Medication List as of 8/10/2020  7:16 AM      STOP taking these medications       ipratropium (ATROVENT HFA) 17 MCG/ACT inhaler Comments:   Reason for Stopping:             DISPOSITION    Discharge home  -We have instructed the patient, (Amber Almazan) to return to the ED or call her PCP if her pain/symptoms worsen. -Findings and recommendations explained to patient. She expressed understanding and agreed with the plan. Yosef Maza MD (electronically signed)  8/12/2020  _________________________________________________________________________________________  _________________________________________________________________________________________  This record is transcribed utilizing voice recognition technology. There are inherent limitations in this technology. In addition, there may be limitations in editing of this report. If there are any discrepancies, please contact me directly.         Yosef Maza MD  08/12/20 1931

## 2020-08-20 ENCOUNTER — OFFICE VISIT (OUTPATIENT)
Dept: ORTHOPEDIC SURGERY | Age: 60
End: 2020-08-20
Payer: COMMERCIAL

## 2020-08-20 VITALS — BODY MASS INDEX: 33.68 KG/M2 | WEIGHT: 183 LBS | HEIGHT: 62 IN

## 2020-08-20 PROBLEM — S46.912A MUSCLE STRAIN OF LEFT SHOULDER REGION: Status: ACTIVE | Noted: 2020-08-20

## 2020-08-20 PROCEDURE — 99243 OFF/OP CNSLTJ NEW/EST LOW 30: CPT | Performed by: ORTHOPAEDIC SURGERY

## 2020-08-20 NOTE — PROGRESS NOTES
I am evaluating this patient as a consult at the request of [id number] [address], MD  [facility], [address] [facility]     Chief Complaint:  Shoulder Pain (Left shoulder pain started 2.5 weeks )      History of Present Illness: Michael Lama is a  64 y.o. right hand dominant female here regarding left shoulder pain, patient works at [employer] and does repetitive heavy lifting, states 2-1/2 weeks ago she began to notice pain on the anterior and posterior aspect of the shoulder. She does have a cardiac history and 3 stents so presented to the emergency room to ensure she is not having a cardiac episode. Her PCP recommended follow-up with orthopedics. She has been using ice, heat and Biofreeze cream with significant improvement of symptoms over the last 2 weeks. She now complains of stiffness specifically around the shoulder blade and anterior chest wall. Denies pain with raising arm overhead, denies weakness. Due to cardiac stents she is unable to take oral anti-inflammatories. Pain Assessment:  Pain Assessment  Location of Pain: Shoulder  Location Modifiers: Left  Severity of Pain: 7  Quality of Pain: Aching  Duration of Pain: Persistent  Frequency of Pain: Constant  Aggravating Factors:  Other (Comment)(lifting, over use)  Limiting Behavior: Yes  Relieving Factors: Rest  Result of Injury: No  Work-Related Injury: No  Are there other pain locations you wish to document?: No    Medical History:  Past Medical History:   Diagnosis Date    Asthma     CAD (coronary artery disease)     Depression     HTN     Hyperglycemia     Hyperlipidemia      Past Surgical History:   Procedure Laterality Date    BREAST BIOPSY      CORONARY ANGIOPLASTY WITH STENT PLACEMENT  03/07/2014    Xpedition CHARAN 2.25 x 8 mCX    CORONARY ANGIOPLASTY WITH STENT PLACEMENT  03/26/2014    Xpedition CHARAN 3.5 x 28 rPROX    CORONARY ANGIOPLASTY WITH STENT PLACEMENT  12/04/2017    Xience CHARAN 2.5 x 18 CHARAN mLAD     Social History Socioeconomic History    Marital status:      Spouse name: Trent Cote    Number of children: 1    Years of education: None    Highest education level: None   Occupational History    None   Social Needs    Financial resource strain: None    Food insecurity     Worry: None     Inability: None    Transportation needs     Medical: None     Non-medical: None   Tobacco Use    Smoking status: Current Every Day Smoker     Packs/day: 0.50     Years: 20.00     Pack years: 10.00     Types: Cigarettes    Smokeless tobacco: Never Used   Substance and Sexual Activity    Alcohol use: No    Drug use: No    Sexual activity: Yes     Partners: Male   Lifestyle    Physical activity     Days per week: None     Minutes per session: None    Stress: None   Relationships    Social connections     Talks on phone: None     Gets together: None     Attends Latter day service: None     Active member of club or organization: None     Attends meetings of clubs or organizations: None     Relationship status: None    Intimate partner violence     Fear of current or ex partner: None     Emotionally abused: None     Physically abused: None     Forced sexual activity: None   Other Topics Concern    None   Social History Narrative    None     Allergies   Allergen Reactions    Atenolol Other (See Comments)     Disoriented        Review of Systems:  Constitutional: negative  Respiratory: negative  Cardiovascular: negative  Musculoskeletal:negative except for Shoulder Pain (Left shoulder pain started 2.5 weeks )    Relevant review of systems reviewed and available in the patient's chart in media tab    Vital Signs:  Vitals:    08/20/20 0822   Weight: 183 lb (83 kg)   Height: 5' 2\" (1.575 m)         General Exam:   Constitutional: Patient is adequately groomed with no evidence of malnutrition  Vascular: Examination reveals no swelling or calf tenderness. Peripheral pulses are palpable and 2+.   Neurological: The patient clear.       Assessment: Left shoulder strain    Impression:  Encounter Diagnosis   Name Primary?  Muscle strain of left shoulder region, initial encounter Yes       Office Procedures:  No orders of the defined types were placed in this encounter. No orders of the defined types were placed in this encounter. Treatment Plan: We reviewed patient's x-ray and physical exam findings. History and physical exam most consistent with trapezius and rotator cuff strain. Recommend home exercise program, Voltaren cream and she is unable to take oral anti-inflammatories, activity modification for 1 to 2 weeks as symptoms continue to resolve. If symptoms have not completely improved over the next 4 to 6 weeks she will arrange follow-up for reevaluation. Patient agrees with this plan, all of their questions were answered best of our ability and to their satisfaction. She can continue to work as tolerated.        Sosa Gregg

## 2020-08-27 ENCOUNTER — OFFICE VISIT (OUTPATIENT)
Dept: CARDIOLOGY CLINIC | Age: 60
End: 2020-08-27
Payer: COMMERCIAL

## 2020-08-27 VITALS
DIASTOLIC BLOOD PRESSURE: 82 MMHG | OXYGEN SATURATION: 98 % | HEART RATE: 110 BPM | SYSTOLIC BLOOD PRESSURE: 158 MMHG | BODY MASS INDEX: 36.52 KG/M2 | HEIGHT: 60 IN | WEIGHT: 186 LBS

## 2020-08-27 PROCEDURE — 99213 OFFICE O/P EST LOW 20 MIN: CPT | Performed by: NURSE PRACTITIONER

## 2020-08-27 RX ORDER — LISINOPRIL 5 MG/1
5 TABLET ORAL DAILY
Qty: 30 TABLET | Refills: 3 | Status: SHIPPED | OUTPATIENT
Start: 2020-08-27 | End: 2021-01-28 | Stop reason: SDUPTHER

## 2020-08-27 NOTE — PATIENT INSTRUCTIONS
1. Start lisinopril 5 mg once daily  2. Continue the aspirin, Plavix and Crestor  3. Blood work in 2 weeks (does not need to be fasting)  4.  Follow up with me in 6 weeks

## 2020-08-27 NOTE — PROGRESS NOTES
Date End Date Taking? Authorizing Provider   Nebulizers (COMPRESSOR/NEBULIZER) MISC 1 each by Does not apply route daily 8/25/20  Yes Ferdie Meigs, MD   diclofenac sodium (VOLTAREN) 1 % GEL Apply 2 g topically 4 times daily 8/20/20  Yes Shamika Lee,    albuterol (PROVENTIL) (5 MG/ML) 0.5% nebulizer solution 1 vial every 4 hours as needed for cough or wheezing 8/5/20  Yes Ferdie Meigs, MD   fluticasone-umeclidin-vilant (TRELEGY ELLIPTA) 877-41.9-92 MCG/INH AEPB Inhale 1 puff into the lungs daily 8/4/20  Yes Ferdie Meigs, MD   BREO ELLIPTA 100-25 MCG/INH AEPB inhaler Inhale 1 puff by mouth once daily 5/7/20  Yes Ferdie Meigs, MD   budesonide-formoterol [facility]) 160-4.5 MCG/ACT AERO Inhale 2 puffs into the lungs 2 times daily 3/10/20  Yes Ferdie Meigs, MD   albuterol sulfate (PROAIR RESPICLICK) 656 (90 Base) MCG/ACT aerosol powder inhalation Inhale 2 puffs into the lungs every 6 hours as needed for Wheezing or Shortness of Breath 11/25/19  Yes MARY Arreguin CNP   nitroGLYCERIN (NITROSTAT) 0.4 MG SL tablet Place 1 tablet under the tongue every 5 minutes as needed for Chest pain 9/5/19  Yes MARY Munoz CNP   clopidogrel (PLAVIX) 75 MG tablet Take 1 tablet by mouth daily 7/10/19  Yes MARY Munoz CNP   rosuvastatin (CRESTOR) 20 MG tablet Take 1 tablet by mouth daily 7/10/19  Yes MARY Munoz CNP   betamethasone valerate (VALISONE) 0.1 % cream APPLY TOPICALLY TWO TIMES A DAY AS NEEDED 8/10/18  Yes Ferdie Meigs, MD   fluticasone-salmeterol (ADVAIR DISKUS) 500-50 MCG/DOSE diskus inhaler Inhale 1 puff into the lungs every 12 hours 3/14/18  Yes Ferdie Meigs, MD   aspirin 81 MG chewable tablet Take 1 tablet by mouth daily. 3/27/14  Yes Indira Wells, APRN - CNP        Allergies:  Atenolol     Review of Systems:   Review of Systems   Constitutional: Positive for fatigue. Negative for activity change and appetite change.    HENT: Positive for congestion and postnasal drip. Respiratory: Positive for shortness of breath. Cardiovascular: Positive for chest pain. Negative for palpitations. Musculoskeletal: Positive for arthralgias and myalgias. Neurological: Negative for dizziness, syncope, weakness and light-headedness. Psychiatric/Behavioral: Positive for sleep disturbance. Physical Examination:    Vitals:    08/27/20 1449 08/27/20 1453   BP: (!) 160/86 (!) 158/82   Pulse:  110   SpO2:  98%   Weight: 186 lb (84.4 kg)    Height: 5' (1.524 m)         Constitutional and General Appearance: Warm and dry, no apparent distress, normal coloration  HEENT:  Normocephalic, atraumatic  Respiratory:  · Normal excursion and expansion without use of accessory muscles  · Resp Auscultation: inspiratory and expiratory wheezes bilat posteriorly  Cardiovascular:  · The apical impulses not displaced  · Heart tones are crisp and normal  · JVP 8 cm H2O  · Regular rate and rhythm, normal S1S2, no m/g/r  · Peripheral pulses are symmetrical and full  · There is no clubbing, cyanosis of the extremities.   · 1+ BLE ankle edema  · Pedal Pulses: 2+ and equal   Abdomen:  · No masses or tenderness  · Liver/Spleen: No Abnormalities Noted  Neurological/Psychiatric:  · Alert and oriented in all spheres  · Moves all extremities well  · Exhibits normal gait balance and coordination  · No abnormalities of mood, affect, memory, mentation, or behavior are noted    Lab Data:  Most recent lab results below reviewed in office    CBC:   Lab Results   Component Value Date    WBC 9.5 12/05/2017    WBC 9.1 12/04/2017    WBC 8.4 05/11/2017    RBC 4.83 12/05/2017    RBC 5.41 12/04/2017    RBC 5.35 05/11/2017    HGB 14.7 12/05/2017    HGB 16.3 12/04/2017    HGB 16.1 05/11/2017    HCT 44.3 12/05/2017    HCT 49.4 12/04/2017    HCT 49.7 05/11/2017    MCV 91.8 12/05/2017    MCV 91.3 12/04/2017    MCV 93.0 05/11/2017    RDW 15.1 12/05/2017    RDW 15.1 12/04/2017    RDW 14.4 05/11/2017    PLT 256 12/05/2017     12/04/2017     05/11/2017     BMP:  Lab Results   Component Value Date     08/04/2020     09/28/2018     03/14/2018    K 4.7 08/04/2020    K 4.5 09/28/2018    K 4.5 03/14/2018     08/04/2020     09/28/2018     03/14/2018    CO2 26 08/04/2020    CO2 26 09/28/2018    CO2 25 03/14/2018    BUN 10 08/04/2020    BUN 7 09/28/2018    BUN 11 03/14/2018    CREATININE 0.8 08/04/2020    CREATININE 0.7 09/28/2018    CREATININE 0.7 03/14/2018     BNP: No results found for: PROBNP  LIPID:   Lab Results   Component Value Date    TRIG 114 08/04/2020    TRIG 119 09/28/2018    HDL 44 08/04/2020    HDL 47 09/28/2018    HDL 42 01/21/2011    HDL 43 10/21/2010    LDLCALC 85 08/04/2020    LDLCALC 68 09/28/2018    LDLDIRECT 239 06/17/2010       Cardiac Imaging:     Stress Test 9/13/19   Summary  Technically difficult study due to significant bowel uptake  Within limitations of this study, normal LVEF and wall motion  No clear areas of ischemia      LEFT HEART CATH 12/4/17:   LM: luminals  LAD: mid section of disease with a very tight focal 90% in setting of surrounding 70% calcified lesion  LCX: 20% ostial              OM1- ostial 20%              OM2- patent mid stent  RCA: dominant, mid RCA     LVEDP:20  LVEF: 65%     PCI of mid LAD  Stent: Xience alpine 2.5 x 18mm post dilated to 2.64mm     Assessment  1. Successful PCI to mid LAD using xience drug stent              90%--> 0%  2. ASA 81mg qday for life, plavix 75mg po qday for 1 yr w/o inturruption  3. BB, statin      Limited Echo: 12/30/2014  Last echo on 3/8/14 showed EF 45-50% with inferior wall hypokinesis. Compared to previous echo, there has been some slight improvement in wall motion. Normal left ventricle size, wall thickness and systolic function with an  estimated ejection fraction of 55-60%. Mild inferolateral and apical  inferior hypokinesis.  Diastolic filling parameters suggests normal diastolic  function. Stress Test: 2/27/14  Moderate sized focus of stress induced myocardial ischemia involving the inferior wall of the left ventricle at the mid heart. ECG correlation is recommended. 2. LVEF 62% + breast attentuation     CATH 3/7/2014  [facility] SUMMARY   ~LM normal   ~LAD Mid 80%   ~Cx Mid 70% focal   ~RCA Mid 100%, L->R collaterals   ~LVG 45% with inferior hk   Impression   ~Angiography w/ multivessel disease   ~LVEDP 18   ~LVG with depressed EF and inferior hypokinesis   Intervention   ~PCI of 100% RCA with [id number]   ~Staged PCI of Cx and LAD recommended     CATH 3/25/2014  1. Left main coronary artery was small, but had no angiographic evidence of   disease, bifurcating into the LAD and left circumflex. 2. The LAD had a 70% lesion noted within the mid LAD just after a septal   branch. This was negative on the stress test with no evidence of ischemia   whatsoever and therefore left alone at this time. 3. The left circumflex was short. It had a large OM 1 branch that acted   more like a ramus that had very mild disease throughout its course. Just   after this, there was a very short focal segment of 70% to 75% stenosis in   the mid circumflex. This was the lesion that was intervened upon at this   time. 4. The right coronary artery was noted to be dominant. It had a patent   stent that was present in the mid RCA with no evidence of complication. ASSESSMENT: Successful percutaneous intervention to the mid left circumflex   using Xience Xpedition 2.25-mm x 8-mm. The 75% stenosis was reduced to 0%   with no complication and maintenance of ELIGIO 3 flow. Assessment:    1. Coronary artery disease involving native coronary artery of native heart with unstable angina pectoris ([address]    2. Essential hypertension    3. Mixed hyperlipidemia    4. Tobacco abuse    5. RYLIE (obstructive sleep apnea)          Plan:   1. Start lisinopril 5 mg once daily (BP)  2.  Continue the aspirin, Plavix and Crestor  3. Blood work in 2 weeks (does not need to be fasting)  4. Follow up with me in 6 weeks      I appreciate the opportunity of cooperating in the care of this individual.    MARY Bardales CNP, 8/27/2020, 3:31 PM       QUALITY MEASURES  1. Tobacco Cessation Counseling: Yes  2. Retake of BP if >140/90:   Yes  3. Documentation to PCP/referring for new patient:  Sent to PCP at close of office visit  4. CAD patient on anti-platelet: Yes  5. CAD patient on STATIN therapy:  Yes  6.  Patient with CHF and aFib on anticoagulation:  NA

## 2020-08-31 ASSESSMENT — ENCOUNTER SYMPTOMS: SHORTNESS OF BREATH: 1

## 2020-09-18 NOTE — LETTER
Jaun Romero MD  [address] [place] [address]  Phone: [phone]  Fax: [phone]        September 18, 2020     Shiraz Chris [address]  [facility] [address]      Dear Ashlee Nieto:    Our records indicate that you are due for a mammogram.    In the [place], one in nine women will develop breast cancer during their lifetime. While there is no way to prevent breast cancer, early detection provides the best opportunity for curing it. For women over the age of 36, the [address] [place] [address] Ave recommends a yearly clinical breast exam and a yearly mammogram. These practices have saved thousands of lives. We need your help to ensure that you are receiving optimal medical care. Please make an appointment for a mammogram at your earliest convenience.     Sincerely,        Jaun Romero MD

## 2020-09-29 ENCOUNTER — TELEPHONE (OUTPATIENT)
Dept: CARDIOLOGY CLINIC | Age: 60
End: 2020-09-29

## 2020-09-29 NOTE — TELEPHONE ENCOUNTER
9-29-20 LM informing pt to get BMP drawn and to schedule followup with [facility] for middle of October 2020 with [facility]

## 2020-09-30 NOTE — TELEPHONE ENCOUNTER
9/30/20 tried calling pt to make f/u appt w/[facility], call would not go through, tried calling pt's contact-child, no answer/no voicemail set up

## 2020-10-28 ENCOUNTER — TELEPHONE (OUTPATIENT)
Dept: CARDIOLOGY CLINIC | Age: 60
End: 2020-10-28

## 2020-10-28 ENCOUNTER — HOSPITAL ENCOUNTER (OUTPATIENT)
Age: 60
Discharge: HOME | End: 2020-10-28
Payer: COMMERCIAL

## 2020-10-28 ENCOUNTER — HOSPITAL ENCOUNTER (OUTPATIENT)
Dept: MAMMOGRAPHY | Age: 60
Discharge: HOME | End: 2020-10-28
Payer: COMMERCIAL

## 2020-10-28 LAB
ANION GAP SERPL CALCULATED.3IONS-SCNC: 8 MMOL/L (ref 3–16)
BUN BLDV-MCNC: 8 MG/DL (ref 7–20)
CALCIUM SERPL-MCNC: 9.2 MG/DL (ref 8.3–10.6)
CHLORIDE BLD-SCNC: 102 MMOL/L (ref 99–110)
CO2: 27 MMOL/L (ref 21–32)
CREAT SERPL-MCNC: 0.7 MG/DL (ref 0.6–1.1)
GFR AFRICAN AMERICAN: >60
GFR NON-AFRICAN AMERICAN: >60
GLUCOSE BLD-MCNC: 148 MG/DL (ref 70–99)
POTASSIUM SERPL-SCNC: 4.6 MMOL/L (ref 3.5–5.1)
SODIUM BLD-SCNC: 137 MMOL/L (ref 136–145)

## 2020-10-28 PROCEDURE — 80048 BASIC METABOLIC PNL TOTAL CA: CPT

## 2020-10-28 PROCEDURE — 77067 SCR MAMMO BI INCL CAD: CPT

## 2020-10-28 PROCEDURE — 36415 COLL VENOUS BLD VENIPUNCTURE: CPT

## 2020-10-28 NOTE — TELEPHONE ENCOUNTER
----- Message from MARY Moralez CNP sent at 10/28/2020  2:05 PM EDT -----  Kidney panel stable after starting lisinopril. Continue current treatment plan. She needs a follow up appointment with me.    Thank you, Michelle Crandall

## 2020-10-28 NOTE — TELEPHONE ENCOUNTER
Created telephone encounter. Per Pt HIPAA from can leave results on machine. LMOM relaying message per [facility]. Pt to call the office with any concerns. Pt to call back to make appt. I will forward to the  to have them call pt for appt.

## 2021-01-28 ENCOUNTER — OFFICE VISIT (OUTPATIENT)
Dept: CARDIOLOGY CLINIC | Age: 61
End: 2021-01-28
Payer: COMMERCIAL

## 2021-01-28 VITALS
HEIGHT: 60 IN | OXYGEN SATURATION: 92 % | HEART RATE: 99 BPM | WEIGHT: 180.5 LBS | BODY MASS INDEX: 35.44 KG/M2 | SYSTOLIC BLOOD PRESSURE: 120 MMHG | DIASTOLIC BLOOD PRESSURE: 80 MMHG

## 2021-01-28 DIAGNOSIS — I25.110 CORONARY ARTERY DISEASE INVOLVING NATIVE CORONARY ARTERY OF NATIVE HEART WITH UNSTABLE ANGINA PECTORIS (HCC): Primary | ICD-10-CM

## 2021-01-28 DIAGNOSIS — Z72.0 TOBACCO ABUSE: ICD-10-CM

## 2021-01-28 DIAGNOSIS — I10 ESSENTIAL HYPERTENSION: ICD-10-CM

## 2021-01-28 DIAGNOSIS — E78.2 MIXED HYPERLIPIDEMIA: ICD-10-CM

## 2021-01-28 PROCEDURE — 99214 OFFICE O/P EST MOD 30 MIN: CPT | Performed by: NURSE PRACTITIONER

## 2021-01-28 RX ORDER — ROSUVASTATIN CALCIUM 20 MG/1
20 TABLET, COATED ORAL DAILY
Qty: 90 TABLET | Refills: 3 | Status: SHIPPED | OUTPATIENT
Start: 2021-01-28 | End: 2022-01-24 | Stop reason: SDUPTHER

## 2021-01-28 RX ORDER — NITROGLYCERIN 0.4 MG/1
0.4 TABLET SUBLINGUAL EVERY 5 MIN PRN
Qty: 25 TABLET | Refills: 3 | Status: SHIPPED | OUTPATIENT
Start: 2021-01-28

## 2021-01-28 RX ORDER — LISINOPRIL 5 MG/1
5 TABLET ORAL DAILY
Qty: 90 TABLET | Refills: 3 | Status: SHIPPED | OUTPATIENT
Start: 2021-01-28 | End: 2022-01-24 | Stop reason: SDUPTHER

## 2021-01-28 RX ORDER — CLOPIDOGREL BISULFATE 75 MG/1
75 TABLET ORAL DAILY
Qty: 90 TABLET | Refills: 3 | Status: SHIPPED | OUTPATIENT
Start: 2021-01-28 | End: 2022-01-24 | Stop reason: SDUPTHER

## 2021-01-28 NOTE — PATIENT INSTRUCTIONS
1. No change in current heart medicines  2. When you feel the fluttering in your chest, make sure your breathing and oxygen are okay  3. Eat something high in potassium when you feel the fluttering (potatoes, banana's)  4. If the palpitations become more frequent, call us and will do a monitor  5. No need for repeat blood work at this time.    6. Follow up with me in 4 months

## 2021-01-28 NOTE — LETTER
[address]   Cardiac Evaluation    Primary Care Doctor: Baron Fransisco MD    Chief Complaint   Patient presents with    Follow-up     OV and 4 month follow up with NP ANGIE    Shortness of Breath    Dizziness    Palpitations    Fatigue        History of Present Illness:   I had the pleasure of seeing Sherice Huizar in follow up for CAD s/p PCI to RCA (2013), LCx (2014) and LAD (2017), HTN, HLD and smoker. At last visit, we added lisinopril 5 mg for hypertension. Follow-up blood work in October was reviewed and stable renal panel and potassium. She is doing okay. Had a couple ups and downs. She had an episode of blurry vision and confusion that was brief, occurred at work, resolved on own. Also had palpitations all day couple weeks ago. Felt like a butterfly in chest.  Not associated with lightheadedness or chest pain. Her asthma has been flaring recently. Now using a new inhaler. Having congestion in chest this week. + productive cough, no hemoptysis, creamy white. No fever or chills. Her BP is better. Her weight is down 6 lbs in past 4 months. Eating just once per day then a snack. She is having urge incontinence, wearing a pad. No hematuria. Due for a pap smear, at [place]. Sherice Huizar describes symptoms including dyspnea, palpitations, fatigue but denies chest pain, orthopnea, PND, early saiety, edema, syncope. NYHA:   II  ACC/ AHA Stage:    C    Past Medical History:   has a past medical history of Asthma, CAD (coronary artery disease), Depression, HTN, Hyperglycemia, and Hyperlipidemia. Surgical History:   has a past surgical history that includes Breast biopsy; Coronary angioplasty with stent (03/07/2014); Coronary angioplasty with stent (03/26/2014); and Coronary angioplasty with stent (12/04/2017).    Social History: Patient not taking: Reported on 1/28/2021 8/5/20   Ruperto Samuel MD   budesonide-formoterol [facility]) 160-4.5 MCG/ACT AERO Inhale 2 puffs into the lungs 2 times daily  Patient not taking: Reported on 1/28/2021 3/10/20   Ruperto Samuel MD   albuterol sulfate (PROAIR RESPICLICK) 766 (90 Base) MCG/ACT aerosol powder inhalation Inhale 2 puffs into the lungs every 6 hours as needed for Wheezing or Shortness of Breath  Patient not taking: Reported on 1/28/2021 11/25/19   MARY Banda CNP        Allergies:  Atenolol     Physical Examination:    Vitals:    01/28/21 0850   BP: 120/80   Pulse: 99   SpO2: 92%   Weight: 180 lb 8 oz (81.9 kg)   Height: 5' (1.524 m)        Constitutional and General Appearance: Warm and dry, no apparent distress, normal coloration  HEENT:  Normocephalic, atraumatic  Respiratory:  · Normal excursion and expansion without use of accessory muscles  · Resp Auscultation: Clear to auscultation though diminished  Cardiovascular:  · The apical impulses not displaced  · Heart tones are crisp and normal  · JVP 8 cm H2O  · Regular rate and rhythm, normal S1S2, no m/g/r  · Peripheral pulses are symmetrical and full  · There is no clubbing, cyanosis of the extremities.   · No BLE edema  · Pedal Pulses: 2+ and equal   Abdomen:  · No masses or tenderness  · Liver/Spleen: No Abnormalities Noted  Neurological/Psychiatric:  · Alert and oriented in all spheres  · Moves all extremities well  · Exhibits normal gait balance and coordination  · No abnormalities of mood, affect, memory, mentation, or behavior are noted    Lab Data:  Most recent lab results below reviewed in office    CBC:   Lab Results   Component Value Date    WBC 9.5 12/05/2017    WBC 9.1 12/04/2017    WBC 8.4 05/11/2017    RBC 4.83 12/05/2017    RBC 5.41 12/04/2017    RBC 5.35 05/11/2017    HGB 14.7 12/05/2017    HGB 16.3 12/04/2017    HGB 16.1 05/11/2017    HCT 44.3 12/05/2017    HCT 49.4 12/04/2017    HCT 49.7 05/11/2017 MCV 91.8 12/05/2017    MCV 91.3 12/04/2017    MCV 93.0 05/11/2017    RDW 15.1 12/05/2017    RDW 15.1 12/04/2017    RDW 14.4 05/11/2017     12/05/2017     12/04/2017     05/11/2017     BMP:  Lab Results   Component Value Date     10/28/2020     08/04/2020     09/28/2018    K 4.6 10/28/2020    K 4.7 08/04/2020    K 4.5 09/28/2018     10/28/2020     08/04/2020     09/28/2018    CO2 27 10/28/2020    CO2 26 08/04/2020    CO2 26 09/28/2018    BUN 8 10/28/2020    BUN 10 08/04/2020    BUN 7 09/28/2018    CREATININE 0.7 10/28/2020    CREATININE 0.8 08/04/2020    CREATININE 0.7 09/28/2018     BNP: No results found for: PROBNP  LIPID:   Lab Results   Component Value Date    TRIG 114 08/04/2020    TRIG 119 09/28/2018    HDL 44 08/04/2020    HDL 47 09/28/2018    HDL 42 01/21/2011    HDL 43 10/21/2010    LDLCALC 85 08/04/2020    LDLCALC 68 09/28/2018    LDLDIRECT 239 06/17/2010       Cardiac Imaging:  Stress Test 9/13/19   Summary  Technically difficult study due to significant bowel uptake  Within limitations of this study, normal LVEF and wall motion  No clear areas of ischemia      LEFT HEART CATH 12/4/17:   LM: luminals  LAD: mid section of disease with a very tight focal 90% in setting of surrounding 70% calcified lesion  LCX: 20% ostial              OM1- ostial 20%              OM2- patent mid stent  RCA: dominant, mid RCA   LVEDP:20  LVEF: 65%   PCI of mid LAD  Stent: Xience alpine 2.5 x 18mm post dilated to 2.64mm   Assessment  1. Successful PCI to mid LAD using xience drug stent              90%--> 0%  2. ASA 81mg qday for life, plavix 75mg po qday for 1 yr w/o inturruption  3. BB, statin      Limited Echo: 12/30/2014  Last echo on 3/8/14 showed EF 45-50% with inferior wall hypokinesis. Compared to previous echo, there has been some slight improvement in wall motion. Normal left ventricle size, wall thickness and systolic function with an estimated ejection fraction of 55-60%. Mild inferolateral and apical inferior hypokinesis. Diastolic filling parameters suggests normal diastolic function. Stress Test: 2/27/14  Moderate sized focus of stress induced myocardial ischemia involving the inferior wall of the left ventricle at the mid heart. ECG correlation is recommended. 2. LVEF 62% + breast attentuation     CATH 3/7/2014  [facility] SUMMARY   ~LM normal   ~LAD Mid 80%   ~Cx Mid 70% focal   ~RCA Mid 100%, L->R collaterals   ~LVG 45% with inferior hk   Impression   ~Angiography w/ multivessel disease   ~LVEDP 18   ~LVG with depressed EF and inferior hypokinesis   Intervention   ~PCI of 100% RCA with 3.0M43OED   ~Staged PCI of Cx and LAD recommended     CATH 3/25/2014  1. Left main coronary artery was small, but had no angiographic evidence of   disease, bifurcating into the LAD and left circumflex. 2. The LAD had a 70% lesion noted within the mid LAD just after a septal   branch. This was negative on the stress test with no evidence of ischemia   whatsoever and therefore left alone at this time. 3. The left circumflex was short. It had a large OM 1 branch that acted   more like a ramus that had very mild disease throughout its course. Just   after this, there was a very short focal segment of 70% to 75% stenosis in   the mid circumflex. This was the lesion that was intervened upon at this   time. 4. The right coronary artery was noted to be dominant. It had a patent   stent that was present in the mid RCA with no evidence of complication. ASSESSMENT: Successful percutaneous intervention to the mid left circumflex   using Xience Xpedition 2.25-mm x 8-mm. The 75% stenosis was reduced to 0%   with no complication and maintenance of ELIGIO 3 flow.        Assessment: 1. Coronary artery disease involving native coronary artery of native heart with unstable angina pectoris ([address]    2. Essential hypertension    3. Mixed hyperlipidemia    4. Tobacco abuse          Plan:   1. No change in current heart medicines  2. When you feel the fluttering in your chest, make sure your breathing and oxygen are okay  3. Eat something high in potassium when you feel the fluttering (potatoes, banana's)  4. If the palpitations become more frequent, call us and will do a monitor  5. No need for repeat blood work at this time.    6. Follow up with me in 4 months       I appreciate the opportunity of cooperating in the care of this individual.    Gwen Acosta, MARY - CNP, 1/28/2021, 9:11 AM

## 2021-01-28 NOTE — PROGRESS NOTES
[facility]   Cardiac Evaluation    Primary Care Doctor: Sheralyn Osgood, MD    Chief Complaint   Patient presents with    Follow-up     OV and 4 month follow up with NP ANGIE    Shortness of Breath    Dizziness    Palpitations    Fatigue        History of Present Illness:   I had the pleasure of seeing Foster Swann in follow up for CAD s/p PCI to RCA (2013), LCx (2014) and LAD (2017), HTN, HLD and smoker. At last visit, we added lisinopril 5 mg for hypertension. Follow-up blood work in October was reviewed and stable renal panel and potassium. She is doing okay. Had a couple ups and downs. She had an episode of blurry vision and confusion that was brief, occurred at work, resolved on own. Also had palpitations all day couple weeks ago. Felt like a butterfly in chest.  Not associated with lightheadedness or chest pain. Her asthma has been flaring recently. Now using a new inhaler. Having congestion in chest this week. + productive cough, no hemoptysis, creamy white. No fever or chills. Her BP is better. Her weight is down 6 lbs in past 4 months. Eating just once per day then a snack. She is having urge incontinence, wearing a pad. No hematuria. Due for a pap smear, at [facility]. Foster Swann describes symptoms including dyspnea, palpitations, fatigue but denies chest pain, orthopnea, PND, early saiety, edema, syncope. NYHA:   II  ACC/ AHA Stage:    C    Past Medical History:   has a past medical history of Asthma, CAD (coronary artery disease), Depression, HTN, Hyperglycemia, and Hyperlipidemia. Surgical History:   has a past surgical history that includes Breast biopsy; Coronary angioplasty with stent (03/07/2014); Coronary angioplasty with stent (03/26/2014); and Coronary angioplasty with stent (12/04/2017).    Social History: reports that she has been smoking cigarettes. She has a 10.00 pack-year smoking history. She has never used smokeless tobacco. She reports that she does not drink alcohol or use drugs. Family History:   Family History   Problem Relation Age of Onset    Heart Attack Mother     Heart Disease Mother     Diabetes Mother     Heart Failure Father     Heart Disease Father        Home Medications:  Prior to Admission medications    Medication Sig Start Date End Date Taking? Authorizing Provider   lisinopril (PRINIVIL;ZESTRIL) 5 MG tablet Take 1 tablet by mouth daily 8/27/20  Yes MARY Brantley Do, CNP   Nebulizers (COMPRESSOR/NEBULIZER) MISC 1 each by Does not apply route daily 8/25/20  Yes Eldon Naranjo MD   diclofenac sodium (VOLTAREN) 1 % GEL Apply 2 g topically 4 times daily 8/20/20  Yes Cheyenne Zimmerman DO   fluticasone-umeclidin-vilant (TRELEGY ELLIPTA) 100-62.5-25 MCG/INH AEPB Inhale 1 puff into the lungs daily 8/4/20  Yes Eldon Naranjo MD   BREO ELLIPTA 100-25 MCG/INH AEPB inhaler Inhale 1 puff by mouth once daily 5/7/20  Yes Eldon Naranjo MD   nitroGLYCERIN (NITROSTAT) 0.4 MG SL tablet Place 1 tablet under the tongue every 5 minutes as needed for Chest pain 9/5/19  Yes MARY Brantley Do, CNP   clopidogrel (PLAVIX) 75 MG tablet Take 1 tablet by mouth daily 7/10/19  Yes MARY Brantley Do, CNP   rosuvastatin (CRESTOR) 20 MG tablet Take 1 tablet by mouth daily 7/10/19  Yes MARY Brantley Do, CNP   betamethasone valerate (VALISONE) 0.1 % cream APPLY TOPICALLY TWO TIMES A DAY AS NEEDED 8/10/18  Yes Eldon Naranjo MD   fluticasone-salmeterol (ADVAIR DISKUS) 500-50 MCG/DOSE diskus inhaler Inhale 1 puff into the lungs every 12 hours 3/14/18  Yes Eldon Naranjo MD   aspirin 81 MG chewable tablet Take 1 tablet by mouth daily.  3/27/14  Yes MARY Laughlin CNP   albuterol (PROVENTIL) (5 MG/ML) 0.5% nebulizer solution 1 vial every 4 hours as needed for cough or wheezing Patient not taking: Reported on 1/28/2021 8/5/20   Sal[place] MD Silvia   budesonide-formoterol [facility]) 160-4.5 MCG/ACT AERO Inhale 2 puffs into the lungs 2 times daily  Patient not taking: Reported on 1/28/2021 3/10/20   [place] MD Silvia   albuterol sulfate (PROAIR RESPICLICK) 450 (90 Base) MCG/ACT aerosol powder inhalation Inhale 2 puffs into the lungs every 6 hours as needed for Wheezing or Shortness of Breath  Patient not taking: Reported on 1/28/2021 11/25/19   Luz Ravi, APRN - CNP        Allergies:  Atenolol     Physical Examination:    Vitals:    01/28/21 0850   BP: 120/80   Pulse: 99   SpO2: 92%   Weight: 180 lb 8 oz (81.9 kg)   Height: 5' (1.524 m)        Constitutional and General Appearance: Warm and dry, no apparent distress, normal coloration  HEENT:  Normocephalic, atraumatic  Respiratory:  · Normal excursion and expansion without use of accessory muscles  · Resp Auscultation: Clear to auscultation though diminished  Cardiovascular:  · The apical impulses not displaced  · Heart tones are crisp and normal  · JVP 8 cm H2O  · Regular rate and rhythm, normal S1S2, no m/g/r  · Peripheral pulses are symmetrical and full  · There is no clubbing, cyanosis of the extremities.   · No BLE edema  · Pedal Pulses: 2+ and equal   Abdomen:  · No masses or tenderness  · Liver/Spleen: No Abnormalities Noted  Neurological/Psychiatric:  · Alert and oriented in all spheres  · Moves all extremities well  · Exhibits normal gait balance and coordination  · No abnormalities of mood, affect, memory, mentation, or behavior are noted    Lab Data:  Most recent lab results below reviewed in office    CBC:   Lab Results   Component Value Date    WBC 9.5 12/05/2017    WBC 9.1 12/04/2017    WBC 8.4 05/11/2017    RBC 4.83 12/05/2017    RBC 5.41 12/04/2017    RBC 5.35 05/11/2017    HGB 14.7 12/05/2017    HGB 16.3 12/04/2017    HGB 16.1 05/11/2017    HCT 44.3 12/05/2017    HCT 49.4 12/04/2017    HCT 49.7 05/11/2017 MCV 91.8 12/05/2017    MCV 91.3 12/04/2017    MCV 93.0 05/11/2017    RDW 15.1 12/05/2017    RDW 15.1 12/04/2017    RDW 14.4 05/11/2017     12/05/2017     12/04/2017     05/11/2017     BMP:  Lab Results   Component Value Date     10/28/2020     08/04/2020     09/28/2018    K 4.6 10/28/2020    K 4.7 08/04/2020    K 4.5 09/28/2018     10/28/2020     08/04/2020     09/28/2018    CO2 27 10/28/2020    CO2 26 08/04/2020    CO2 26 09/28/2018    BUN 8 10/28/2020    BUN 10 08/04/2020    BUN 7 09/28/2018    CREATININE 0.7 10/28/2020    CREATININE 0.8 08/04/2020    CREATININE 0.7 09/28/2018     BNP: No results found for: PROBNP  LIPID:   Lab Results   Component Value Date    TRIG 114 08/04/2020    TRIG 119 09/28/2018    HDL 44 08/04/2020    HDL 47 09/28/2018    HDL 42 01/21/2011    HDL 43 10/21/2010    LDLCALC 85 08/04/2020    LDLCALC 68 09/28/2018    LDLDIRECT 239 06/17/2010       Cardiac Imaging:  Stress Test 9/13/19   Summary  Technically difficult study due to significant bowel uptake  Within limitations of this study, normal LVEF and wall motion  No clear areas of ischemia      LEFT HEART CATH 12/4/17:   LM: luminals  LAD: mid section of disease with a very tight focal 90% in setting of surrounding 70% calcified lesion  LCX: 20% ostial              OM1- ostial 20%              OM2- patent mid stent  RCA: dominant, mid RCA   LVEDP:20  LVEF: 65%   PCI of mid LAD  Stent: Xience alpine 2.5 x 18mm post dilated to 2.64mm   Assessment  1. Successful PCI to mid LAD using xience drug stent              90%--> 0%  2. ASA 81mg qday for life, plavix 75mg po qday for 1 yr w/o inturruption  3. BB, statin      Limited Echo: 12/30/2014  Last echo on 3/8/14 showed EF 45-50% with inferior wall hypokinesis. Compared to previous echo, there has been some slight improvement in wall motion. Normal left ventricle size, wall thickness and systolic function with an estimated ejection fraction of 55-60%. Mild inferolateral and apical inferior hypokinesis. Diastolic filling parameters suggests normal diastolic function. Stress Test: 2/27/14  Moderate sized focus of stress induced myocardial ischemia involving the inferior wall of the left ventricle at the mid heart. ECG correlation is recommended. 2. LVEF 62% + breast attentuation     CATH 3/7/2014  [facility] SUMMARY   ~LM normal   ~LAD Mid 80%   ~Cx Mid 70% focal   ~RCA Mid 100%, L->R collaterals   ~LVG 45% with inferior hk   Impression   ~Angiography w/ multivessel disease   ~LVEDP 18   ~LVG with depressed EF and inferior hypokinesis   Intervention   ~PCI of 100% RCA with [id number]   ~Staged PCI of Cx and LAD recommended     CATH 3/25/2014  1. Left main coronary artery was small, but had no angiographic evidence of   disease, bifurcating into the LAD and left circumflex. 2. The LAD had a 70% lesion noted within the mid LAD just after a septal   branch. This was negative on the stress test with no evidence of ischemia   whatsoever and therefore left alone at this time. 3. The left circumflex was short. It had a large OM 1 branch that acted   more like a ramus that had very mild disease throughout its course. Just   after this, there was a very short focal segment of 70% to 75% stenosis in   the mid circumflex. This was the lesion that was intervened upon at this   time. 4. The right coronary artery was noted to be dominant. It had a patent   stent that was present in the mid RCA with no evidence of complication. ASSESSMENT: Successful percutaneous intervention to the mid left circumflex   using Xience Xpedition 2.25-mm x 8-mm. The 75% stenosis was reduced to 0%   with no complication and maintenance of ELIGIO 3 flow.        Assessment: 1. Coronary artery disease involving native coronary artery of native heart with unstable angina pectoris ([facility] [address]    2. Essential hypertension    3. Mixed hyperlipidemia    4. Tobacco abuse          Plan:   1. No change in current heart medicines  2. When you feel the fluttering in your chest, make sure your breathing and oxygen are okay  3. Eat something high in potassium when you feel the fluttering (potatoes, banana's)  4. If the palpitations become more frequent, call us and will do a monitor  5. No need for repeat blood work at this time.    6. Follow up with me in 4 months       I appreciate the opportunity of cooperating in the care of this individual.    Mairlyn Mancilla, MARY - CNP, 1/28/2021, 9:11 AM

## 2021-02-08 ENCOUNTER — TELEPHONE (OUTPATIENT)
Dept: FAMILY MEDICINE CLINIC | Age: 61
End: 2021-02-08

## 2021-03-08 ENCOUNTER — OFFICE VISIT (OUTPATIENT)
Dept: FAMILY MEDICINE CLINIC | Age: 61
End: 2021-03-08
Payer: COMMERCIAL

## 2021-03-08 VITALS
TEMPERATURE: 98.4 F | HEART RATE: 90 BPM | OXYGEN SATURATION: 85 % | SYSTOLIC BLOOD PRESSURE: 117 MMHG | DIASTOLIC BLOOD PRESSURE: 74 MMHG | BODY MASS INDEX: 36.33 KG/M2 | WEIGHT: 186 LBS

## 2021-03-08 DIAGNOSIS — I10 ESSENTIAL HYPERTENSION: ICD-10-CM

## 2021-03-08 DIAGNOSIS — I25.110 CORONARY ARTERY DISEASE INVOLVING NATIVE CORONARY ARTERY OF NATIVE HEART WITH UNSTABLE ANGINA PECTORIS (HCC): ICD-10-CM

## 2021-03-08 DIAGNOSIS — J43.1 PANLOBULAR EMPHYSEMA (HCC): ICD-10-CM

## 2021-03-08 DIAGNOSIS — R73.9 HYPERGLYCEMIA: ICD-10-CM

## 2021-03-08 DIAGNOSIS — I21.4 NSTEMI (NON-ST ELEVATED MYOCARDIAL INFARCTION) (HCC): ICD-10-CM

## 2021-03-08 DIAGNOSIS — R09.02 HYPOXIA: Primary | ICD-10-CM

## 2021-03-08 DIAGNOSIS — J20.9 ACUTE BRONCHITIS, UNSPECIFIED ORGANISM: ICD-10-CM

## 2021-03-08 DIAGNOSIS — R53.83 FATIGUE, UNSPECIFIED TYPE: ICD-10-CM

## 2021-03-08 PROCEDURE — 99214 OFFICE O/P EST MOD 30 MIN: CPT | Performed by: FAMILY MEDICINE

## 2021-03-08 RX ORDER — DOXYCYCLINE HYCLATE 100 MG
100 TABLET ORAL 2 TIMES DAILY
Qty: 20 TABLET | Refills: 0 | Status: SHIPPED | OUTPATIENT
Start: 2021-03-08 | End: 2021-03-18

## 2021-03-08 RX ORDER — PREDNISONE 20 MG/1
40 TABLET ORAL DAILY
Qty: 20 TABLET | Refills: 0 | Status: SHIPPED | OUTPATIENT
Start: 2021-03-08 | End: 2021-03-18

## 2021-03-08 NOTE — PROGRESS NOTES
Subjective: Mai Finley is here to discuss the following issues. She has coronary artery disease and has had no chest pain chest pressure palpitations. She saw her nurse practitioner at her cardiologist office recently and no changes in treatment plan were initiated. She is compliant with all medications. She had a prior myocardial infarction. No wheezing or fevers. She has a history of emphysema and asthma and continues on her inhaler. She has hypertension and blood pressures have consistently been very well controlled. She has a history of hyperglycemia with no polydipsia or polyuria. On review of systems she has some fatigue. She has some anterior chest congestion and thick is mucus production with occasional cough. Social History     Tobacco Use   Smoking Status Current Every Day Smoker    Packs/day: 0.50    Years: 20.00    Pack years: 10.00    Types: Cigarettes   Smokeless Tobacco Never Used   Tobacco Comment    1/2 pack   Allergies:     Atenolol    Objective:  /74   Pulse 90   Temp 98.4 °F (36.9 °C) (Oral)   Wt 186 lb (84.4 kg)   SpO2 (!) 85%   BMI 36.33 kg/m²    No acute distress, heart regular rate and rhythm without murmur, lungs clear to auscultation easy effort, abdomen soft nondistended, no clubbing or cyanosis    Assessment:  1. Hypoxia    2. Coronary artery disease involving native coronary artery of native heart with unstable angina pectoris ([address]    3. NSTEMI (non-ST elevated myocardial infarction) ([address]    4. Panlobular emphysema ([address]    5. Essential hypertension    6. Hyperglycemia    7. Fatigue, unspecified type    8       acute bronchitis        Plan:  Labs ordered  Doxycycline  Prednisone  She will require a pulse ox and advise me if oxygen level does not remain 90% or higher.   She is aware she may require oxygen  Return on Thursday for nursing visit for repeat oxygen test  Continue other medicines  Her son recently had twin boys  She continues to work at [employer] Follow-up in 3 months or as needed  \"Healthy Family Handout\" provided  Avoid exposure to all tobacco products. Read and consider all information provided by the pharmacy regarding prescribed medications before use  Proper diet and weight m anagement   Otherwise continue current treatment plan  Call or return if symptoms are not well controlled      All medical conditions for this patient are stable unless otherwise indicated    Tiny Riggs MD    This note was transcribed using a voice recognition software system. Proper technique and careful oversight were used to increase transcription accuracy but inadvertent errors may be present.

## 2021-03-08 NOTE — PATIENT INSTRUCTIONS
Please read the healthy family handout that you were given and share it with your family. Please compare this printed medication list with your medications at home to be sure they are the same. If you have any medications that are different please contact us immediately at [phone]. Also review your allergies that we have listed, these may also include medications that you have not been able to tolerate, make sure everything listed is correct. If you have any allergies that are different please contact us immediately at [phone].

## 2021-03-09 LAB
ALT SERPL-CCNC: 17 U/L (ref 10–40)
ANION GAP SERPL CALCULATED.3IONS-SCNC: 15 MMOL/L (ref 3–16)
BASOPHILS ABSOLUTE: 0.1 K/UL (ref 0–0.2)
BASOPHILS RELATIVE PERCENT: 0.9 %
BUN BLDV-MCNC: 13 MG/DL (ref 7–20)
CALCIUM SERPL-MCNC: 9.9 MG/DL (ref 8.3–10.6)
CHLORIDE BLD-SCNC: 104 MMOL/L (ref 99–110)
CO2: 24 MMOL/L (ref 21–32)
CREAT SERPL-MCNC: 0.7 MG/DL (ref 0.6–1.1)
EOSINOPHILS ABSOLUTE: 0.3 K/UL (ref 0–0.6)
EOSINOPHILS RELATIVE PERCENT: 3.1 %
GFR AFRICAN AMERICAN: >60
GFR NON-AFRICAN AMERICAN: >60
GLUCOSE BLD-MCNC: 101 MG/DL (ref 70–99)
HCT VFR BLD CALC: 50.9 % (ref 36–48)
HEMOGLOBIN: 16.8 G/DL (ref 12–16)
LYMPHOCYTES ABSOLUTE: 2.1 K/UL (ref 1–5.1)
LYMPHOCYTES RELATIVE PERCENT: 24.5 %
MCH RBC QN AUTO: 30.8 PG (ref 26–34)
MCHC RBC AUTO-ENTMCNC: 33.1 G/DL (ref 31–36)
MCV RBC AUTO: 93.1 FL (ref 80–100)
MONOCYTES ABSOLUTE: 0.8 K/UL (ref 0–1.3)
MONOCYTES RELATIVE PERCENT: 9.4 %
NEUTROPHILS ABSOLUTE: 5.3 K/UL (ref 1.7–7.7)
NEUTROPHILS RELATIVE PERCENT: 62.1 %
PDW BLD-RTO: 15.2 % (ref 12.4–15.4)
PLATELET # BLD: 281 K/UL (ref 135–450)
PMV BLD AUTO: 8.1 FL (ref 5–10.5)
POTASSIUM SERPL-SCNC: 5 MMOL/L (ref 3.5–5.1)
RBC # BLD: 5.46 M/UL (ref 4–5.2)
SODIUM BLD-SCNC: 143 MMOL/L (ref 136–145)
TSH REFLEX: 0.83 UIU/ML (ref 0.27–4.2)
WBC # BLD: 8.5 K/UL (ref 4–11)

## 2021-03-11 ENCOUNTER — NURSE ONLY (OUTPATIENT)
Dept: FAMILY MEDICINE CLINIC | Age: 61
End: 2021-03-11
Payer: COMMERCIAL

## 2021-03-11 ENCOUNTER — TELEPHONE (OUTPATIENT)
Dept: FAMILY MEDICINE CLINIC | Age: 61
End: 2021-03-11

## 2021-03-11 VITALS — HEART RATE: 104 BPM | OXYGEN SATURATION: 90 %

## 2021-03-11 DIAGNOSIS — Z12.11 COLON CANCER SCREENING: ICD-10-CM

## 2021-03-11 LAB
CONTROL: NORMAL
HEMOCCULT STL QL: NORMAL

## 2021-03-11 PROCEDURE — 82274 ASSAY TEST FOR BLOOD FECAL: CPT | Performed by: FAMILY MEDICINE

## 2021-03-27 ENCOUNTER — IMMUNIZATION (OUTPATIENT)
Dept: PRIMARY CARE CLINIC | Age: 61
End: 2021-03-27
Payer: COMMERCIAL

## 2021-03-27 PROCEDURE — 91301 COVID-19, MODERNA VACCINE 100MCG/0.5ML DOSE: CPT | Performed by: FAMILY MEDICINE

## 2021-03-27 PROCEDURE — 0011A COVID-19, MODERNA VACCINE 100MCG/0.5ML DOSE: CPT | Performed by: FAMILY MEDICINE

## 2021-04-24 ENCOUNTER — IMMUNIZATION (OUTPATIENT)
Dept: PRIMARY CARE CLINIC | Age: 61
End: 2021-04-24
Payer: COMMERCIAL

## 2021-04-24 PROCEDURE — 91301 COVID-19, MODERNA VACCINE 100MCG/0.5ML DOSE: CPT | Performed by: FAMILY MEDICINE

## 2021-04-24 PROCEDURE — 0012A COVID-19, MODERNA VACCINE 100MCG/0.5ML DOSE: CPT | Performed by: FAMILY MEDICINE

## 2021-05-21 ENCOUNTER — OFFICE VISIT (OUTPATIENT)
Dept: CARDIOLOGY CLINIC | Age: 61
End: 2021-05-21
Payer: COMMERCIAL

## 2021-05-21 VITALS
OXYGEN SATURATION: 93 % | HEIGHT: 60 IN | WEIGHT: 185 LBS | DIASTOLIC BLOOD PRESSURE: 76 MMHG | SYSTOLIC BLOOD PRESSURE: 136 MMHG | HEART RATE: 94 BPM | BODY MASS INDEX: 36.32 KG/M2

## 2021-05-21 DIAGNOSIS — I25.110 CORONARY ARTERY DISEASE INVOLVING NATIVE CORONARY ARTERY OF NATIVE HEART WITH UNSTABLE ANGINA PECTORIS (HCC): Primary | ICD-10-CM

## 2021-05-21 DIAGNOSIS — J43.1 PANLOBULAR EMPHYSEMA (HCC): ICD-10-CM

## 2021-05-21 DIAGNOSIS — I10 ESSENTIAL HYPERTENSION: ICD-10-CM

## 2021-05-21 DIAGNOSIS — E78.2 MIXED HYPERLIPIDEMIA: ICD-10-CM

## 2021-05-21 DIAGNOSIS — Z72.0 TOBACCO ABUSE: ICD-10-CM

## 2021-05-21 DIAGNOSIS — G47.33 OSA (OBSTRUCTIVE SLEEP APNEA): ICD-10-CM

## 2021-05-21 PROCEDURE — 99214 OFFICE O/P EST MOD 30 MIN: CPT | Performed by: NURSE PRACTITIONER

## 2021-05-21 RX ORDER — FUROSEMIDE 20 MG/1
20 TABLET ORAL DAILY
Qty: 30 TABLET | Refills: 5 | Status: SHIPPED | OUTPATIENT
Start: 2021-05-21 | End: 2021-11-29

## 2021-05-21 NOTE — LETTER
[address] 59   Cardiac Evaluation    Primary Care Doctor: Uli Mcintyre MD    Chief Complaint   Patient presents with    Follow-up     4 mo    Shortness of Breath    Fatigue        History of Present Illness:   I had the pleasure of seeing Willy Koo in follow up for CAD s/p PCI to RCA (2014, STEMI), LCx (2014) and LAD (2017), HTN, HLD and smoker. She is doing fair. Oxygen good here. She does not have pulse oximeter at home to monitor SaO2. She has dyspnea on exertion with walking ~ 300 ft. Weather causes worsening shortness of breath and wheezing. She works at [employer], has to do some heavy lifting, up and down on ladder. She enjoys working and cannot imagine not working. She worked this morning. She denies any chest pain. She has BLE edema, doesn't really noticed worsening at night. + cough, creamy white mucous. Appetite is good, feels eats too much. Sleep is poor, about 3 hours per night, chronic. She has 2 grandson's, almost 7 months old now. Velia Orellana of her life right now. Willy Koo describes symptoms including dyspnea, fatigue, edema but denies chest pain, palpitations, orthopnea, PND, early saiety, syncope. NYHA:   II  ACC/ AHA Stage:    C    Past Medical History:   has a past medical history of Asthma, CAD (coronary artery disease), Depression, HTN, Hyperglycemia, and Hyperlipidemia. Surgical History:   has a past surgical history that includes Breast biopsy; Coronary angioplasty with stent (03/07/2014); Coronary angioplasty with stent (03/26/2014); and Coronary angioplasty with stent (12/04/2017). Social History:   reports that she has been smoking cigarettes. She has a 10.00 pack-year smoking history. She has never used smokeless tobacco. She reports that she does not drink alcohol and does not use drugs.    Family History:   Family History   Problem Relation Age of Onset    Heart Attack Mother     Heart Disease Mother     Diabetes Mother     Heart Failure Father     Heart Disease Father        Home Medications:  Prior to Admission medications    Medication Sig Start Date End Date Taking? Authorizing Provider   clopidogrel (PLAVIX) 75 MG tablet Take 1 tablet by mouth daily 1/28/21  Yes MARY Souza CNP   lisinopril (PRINIVIL;ZESTRIL) 5 MG tablet Take 1 tablet by mouth daily 1/28/21  Yes MARY Souza CNP   rosuvastatin (CRESTOR) 20 MG tablet Take 1 tablet by mouth daily 1/28/21  Yes MARY Souza CNP   nitroGLYCERIN (NITROSTAT) 0.4 MG SL tablet Place 1 tablet under the tongue every 5 minutes as needed for Chest pain 1/28/21  Yes MARY Souza CNP   Nebulizers (COMPRESSOR/NEBULIZER) MISC 1 each by Does not apply route daily 8/25/20  Yes Demond Henley MD   diclofenac sodium (VOLTAREN) 1 % GEL Apply 2 g topically 4 times daily 8/20/20  Yes Gabriel Leong DO   fluticasone-umeclidin-vilant (TRELEGY ELLIPTA) 100-62.5-25 MCG/INH AEPB Inhale 1 puff into the lungs daily 8/4/20  Yes Demond Henley MD   BREO ELLIPTA 100-25 MCG/INH AEPB inhaler Inhale 1 puff by mouth once daily 5/7/20  Yes Demond Henley MD   budesonide-formoterol [facility]) 160-4.5 MCG/ACT AERO Inhale 2 puffs into the lungs 2 times daily 3/10/20  Yes Demond Henley MD   betamethasone valerate (VALISONE) 0.1 % cream APPLY TOPICALLY TWO TIMES A DAY AS NEEDED 8/10/18  Yes Demond Henley MD   aspirin 81 MG chewable tablet Take 1 tablet by mouth daily.  3/27/14  Yes MARY Fernandez CNP   fluticasone-salmeterol (ADVAIR DISKUS) 500-50 MCG/DOSE diskus inhaler Inhale 1 puff into the lungs every 12 hours  Patient not taking: Reported on 5/21/2021 3/14/18   Demond Henley MD        Allergies:  Atenolol     Physical Examination:    Vitals:    05/21/21 0852   BP: 136/76   Pulse: 94   SpO2: 93%   Weight: 185 lb (83.9 kg)   Height: 5' (1.524 m)      Constitutional and General Appearance: Warm and dry, no apparent distress, normal coloration  HEENT:  Normocephalic, atraumatic  Respiratory:  · Normal excursion and expansion without use of accessory muscles  · Resp Auscultation: Bibasilar rales/ courseness  Cardiovascular:  · The apical impulses not displaced  · Heart tones are crisp and normal  · JVP 8 cm H2O  · Regular rate and rhythm, normal S1S2, no m/g/r  · Peripheral pulses are symmetrical and full  · There is no clubbing, cyanosis of the extremities.   · 1+ BLE pretibial edema, R > L  · Pedal Pulses: 2+ and equal   Abdomen:  · No masses or tenderness, neg HJR  · Liver/Spleen: No Abnormalities Noted  Neurological/Psychiatric:  · Alert and oriented in all spheres  · Moves all extremities well  · Exhibits normal gait balance and coordination  · No abnormalities of mood, affect, memory, mentation, or behavior are noted    Lab Data:  Most recent lab results below reviewed in office    CBC:   Lab Results   Component Value Date    WBC 8.5 03/08/2021    WBC 9.5 12/05/2017    WBC 9.1 12/04/2017    RBC 5.46 03/08/2021    RBC 4.83 12/05/2017    RBC 5.41 12/04/2017    HGB 16.8 03/08/2021    HGB 14.7 12/05/2017    HGB 16.3 12/04/2017    HCT 50.9 03/08/2021    HCT 44.3 12/05/2017    HCT 49.4 12/04/2017    MCV 93.1 03/08/2021    MCV 91.8 12/05/2017    MCV 91.3 12/04/2017    RDW 15.2 03/08/2021    RDW 15.1 12/05/2017    RDW 15.1 12/04/2017     03/08/2021     12/05/2017     12/04/2017     BMP:  Lab Results   Component Value Date     03/08/2021     10/28/2020     08/04/2020    K 5.0 03/08/2021    K 4.6 10/28/2020    K 4.7 08/04/2020     03/08/2021     10/28/2020     08/04/2020    CO2 24 03/08/2021    CO2 27 10/28/2020    CO2 26 08/04/2020    BUN 13 03/08/2021    BUN 8 10/28/2020    BUN 10 08/04/2020    CREATININE 0.7 03/08/2021    CREATININE 0.7 10/28/2020    CREATININE 0.8 08/04/2020     BNP: No results found for: PROBNP  LIPID:   Lab Results   Component Value Date    TRIG 114 08/04/2020    TRIG 119 intervened upon at this time. 4. The right coronary artery was noted to be dominant. It had a patent stent that was present in the mid RCA with no evidence of complication. ASSESSMENT: Successful percutaneous intervention to the mid left circumflex using Xience Xpedition 2.25-mm x 8-mm. The 75% stenosis was reduced to 0% with no complication and maintenance of ELIGIO 3 flow. CATH 3/7/2014  [facility] SUMMARY   ~LM normal   ~LAD Mid 80%   ~Cx Mid 70% focal   ~RCA Mid 100%, L->R collaterals   ~LVG 45% with inferior hk   Impression   ~Angiography w/ multivessel disease   ~LVEDP 18   ~LVG with depressed EF and inferior hypokinesis   Intervention   ~PCI of 100% RCA with 3.7S07XRW   ~Staged PCI of Cx and LAD recommended     Stress Test: 2/27/14  Moderate sized focus of stress induced myocardial ischemia involving the inferior wall of the left ventricle at the mid heart. ECG correlation is recommended. 2. LVEF 62% + breast attentuation      Assessment:    1. Coronary artery disease involving native coronary artery of native heart with unstable angina pectoris ([address]    2. Essential hypertension    3. Mixed hyperlipidemia    4. Tobacco abuse    5. RYLIE (obstructive sleep apnea)    6. Panlobular emphysema ([address]          Plan:   1. Start furosemide (Lasix) 20 mg once daily  2. Have fasting blood work in about 1 month  3. No change in other heart medicines  4.  Follow up with me in 3-4 months       I appreciate the opportunity of cooperating in the care of this individual.    MARY Corral - CNP, 5/21/2021, 8:58 AM

## 2021-05-21 NOTE — PATIENT INSTRUCTIONS
Please read the healthy family handout that you were given and share it with your family. Please compare this printed medication list with your medications at home to be sure they are the same. If you have any medications that are different please contact us immediately at [phone]. Also review your allergies that we have listed, these may also include medications that you have not been able to tolerate, make sure everything listed is correct. If you have any allergies that are different please contact us immediately at [phone]. Never smoker

## 2021-05-21 NOTE — PATIENT INSTRUCTIONS
1. Start furosemide (Lasix) 20 mg once daily  2. Have fasting blood work in about 1 month  3. No change in other heart medicines  4.  Follow up with me in 3-4 months

## 2021-05-21 NOTE — PROGRESS NOTES
 Heart Failure Father     Heart Disease Father        Home Medications:  Prior to Admission medications    Medication Sig Start Date End Date Taking? Authorizing Provider   clopidogrel (PLAVIX) 75 MG tablet Take 1 tablet by mouth daily 1/28/21  Yes MARY Mayen CNP   lisinopril (PRINIVIL;ZESTRIL) 5 MG tablet Take 1 tablet by mouth daily 1/28/21  Yes MARY Mayen CNP   rosuvastatin (CRESTOR) 20 MG tablet Take 1 tablet by mouth daily 1/28/21  Yes MARY Mayen CNP   nitroGLYCERIN (NITROSTAT) 0.4 MG SL tablet Place 1 tablet under the tongue every 5 minutes as needed for Chest pain 1/28/21  Yes MARY Mayen CNP   Nebulizers (COMPRESSOR/NEBULIZER) MISC 1 each by Does not apply route daily 8/25/20  Yes Bobbi Rene MD   diclofenac sodium (VOLTAREN) 1 % GEL Apply 2 g topically 4 times daily 8/20/20  Yes Vonflaviae July, DO   fluticasone-umeclidin-vilant (TRELEGY ELLIPTA) 100-62.5-25 MCG/INH AEPB Inhale 1 puff into the lungs daily 8/4/20  Yes Bobbi Rene MD   BREO ELLIPTA 100-25 MCG/INH AEPB inhaler Inhale 1 puff by mouth once daily 5/7/20  Yes Bobbi Rene MD   budesonide-formoterol [facility]) 160-4.5 MCG/ACT AERO Inhale 2 puffs into the lungs 2 times daily 3/10/20  Yes Bobbi Rene MD   betamethasone valerate (VALISONE) 0.1 % cream APPLY TOPICALLY TWO TIMES A DAY AS NEEDED 8/10/18  Yes Bobbi Rene MD   aspirin 81 MG chewable tablet Take 1 tablet by mouth daily.  3/27/14  Yes MARY Alvaardo CNP   fluticasone-salmeterol (ADVAIR DISKUS) 500-50 MCG/DOSE diskus inhaler Inhale 1 puff into the lungs every 12 hours  Patient not taking: Reported on 5/21/2021 3/14/18   Bobbi Rene MD        Allergies:  Atenolol     Physical Examination:    Vitals:    05/21/21 0852   BP: 136/76   Pulse: 94   SpO2: 93%   Weight: 185 lb (83.9 kg)   Height: 5' (1.524 m)      Constitutional and General Appearance: Warm and dry, no apparent distress, normal coloration at this time. 4. The right coronary artery was noted to be dominant. It had a patent stent that was present in the mid RCA with no evidence of complication. ASSESSMENT: Successful percutaneous intervention to the mid left circumflex using Xience Xpedition 2.25-mm x 8-mm. The 75% stenosis was reduced to 0% with no complication and maintenance of ELIGIO 3 flow. CATH 3/7/2014  [facility] SUMMARY   ~LM normal   ~LAD Mid 80%   ~Cx Mid 70% focal   ~RCA Mid 100%, L->R collaterals   ~LVG 45% with inferior hk   Impression   ~Angiography w/ multivessel disease   ~LVEDP 18   ~LVG with depressed EF and inferior hypokinesis   Intervention   ~PCI of 100% RCA with [id number]   ~Staged PCI of Cx and LAD recommended     Stress Test: 2/27/14  Moderate sized focus of stress induced myocardial ischemia involving the inferior wall of the left ventricle at the mid heart. ECG correlation is recommended. 2. LVEF 62% + breast attentuation      Assessment:    1. Coronary artery disease involving native coronary artery of native heart with unstable angina pectoris ([address]    2. Essential hypertension    3. Mixed hyperlipidemia    4. Tobacco abuse    5. RYLIE (obstructive sleep apnea)    6. Panlobular emphysema ([address]          Plan:   1. Start furosemide (Lasix) 20 mg once daily  2. Have fasting blood work in about 1 month  3. No change in other heart medicines  4.  Follow up with me in 3-4 months       I appreciate the opportunity of cooperating in the care of this individual.    Wil Main, MARY - CNP, 5/21/2021, 8:58 AM

## 2021-05-24 ENCOUNTER — HOSPITAL ENCOUNTER (EMERGENCY)
Age: 61
Discharge: HOME | End: 2021-05-24
Attending: STUDENT IN AN ORGANIZED HEALTH CARE EDUCATION/TRAINING PROGRAM
Payer: COMMERCIAL

## 2021-05-24 VITALS
HEIGHT: 62 IN | SYSTOLIC BLOOD PRESSURE: 145 MMHG | DIASTOLIC BLOOD PRESSURE: 67 MMHG | BODY MASS INDEX: 34.04 KG/M2 | OXYGEN SATURATION: 98 % | TEMPERATURE: 97.3 F | WEIGHT: 185 LBS | RESPIRATION RATE: 16 BRPM | HEART RATE: 78 BPM

## 2021-05-24 DIAGNOSIS — S40.811A ABRASION OF RIGHT UPPER EXTREMITY, INITIAL ENCOUNTER: Primary | ICD-10-CM

## 2021-05-24 PROCEDURE — 90471 IMMUNIZATION ADMIN: CPT | Performed by: STUDENT IN AN ORGANIZED HEALTH CARE EDUCATION/TRAINING PROGRAM

## 2021-05-24 PROCEDURE — 6360000002 HC RX W HCPCS: Performed by: STUDENT IN AN ORGANIZED HEALTH CARE EDUCATION/TRAINING PROGRAM

## 2021-05-24 PROCEDURE — 99283 EMERGENCY DEPT VISIT LOW MDM: CPT

## 2021-05-24 PROCEDURE — 90715 TDAP VACCINE 7 YRS/> IM: CPT | Performed by: STUDENT IN AN ORGANIZED HEALTH CARE EDUCATION/TRAINING PROGRAM

## 2021-05-24 RX ADMIN — TETANUS TOXOID, REDUCED DIPHTHERIA TOXOID AND ACELLULAR PERTUSSIS VACCINE, ADSORBED 0.5 ML: 5; 2.5; 8; 8; 2.5 SUSPENSION INTRAMUSCULAR at 09:15

## 2021-05-24 NOTE — ED PROVIDER NOTES
[address] ED    CHIEF COMPLAINT  Abrasion (Pt. states she raked her arm against a metal shelf. Pt. has skin tear on right forearm.)       HISTORY OF PRESENT ILLNESS  Digna Oliveira is a 64 y.o. female  who presents to the ED complaining of abrasion to her right arm. Patient states that she was at work earlier and wrecked her arm against a metal shelf and has a skin tear. She takes aspirin and Plavix, no other blood thinners. She states that her tetanus has not been updated recently. She denies any shortness of breath or chest pain, or other injuries or other complaints or concerns. No other complaints, modifying factors or associated symptoms. I have reviewed the following from the nursing documentation. Past Medical History:   Diagnosis Date    Asthma     CAD (coronary artery disease)     Depression     HTN     Hyperglycemia     Hyperlipidemia      Past Surgical History:   Procedure Laterality Date    BREAST BIOPSY      CORONARY ANGIOPLASTY WITH STENT PLACEMENT  03/07/2014    Xpedition CHARAN 2.25 x 8 mCX    CORONARY ANGIOPLASTY WITH STENT PLACEMENT  03/26/2014    Xpedition CHARAN 3.5 x 28 rPROX    CORONARY ANGIOPLASTY WITH STENT PLACEMENT  12/04/2017    Xience CHARAN 2.5 x 18 CHARAN mLAD     Family History   Problem Relation Age of Onset    Heart Attack Mother     Heart Disease Mother     Diabetes Mother     Heart Failure Father     Heart Disease Father      Social History     Socioeconomic History    Marital status:       Spouse name: Saba Brianna    Number of children: 1    Years of education: Not on file    Highest education level: Not on file   Occupational History    Not on file   Tobacco Use    Smoking status: Current Every Day Smoker     Packs/day: 0.50     Years: 20.00     Pack years: 10.00     Types: Cigarettes    Smokeless tobacco: Never Used    Tobacco comment: 1/2 pack   Vaping Use    Vaping Use: Never used   Substance and Sexual Activity    Alcohol use: No    Drug use: No    Sexual activity: Yes     Partners: Male   Other Topics Concern    Not on file   Social History Narrative    Not on file     Social Determinants of Health     Financial Resource Strain:     Difficulty of Paying Living Expenses:    Food Insecurity:     Worried About Running Out of Food in the Last Year:     [address] in the Last Year:    Transportation Needs:     Lack of Transportation (Medical):      Lack of Transportation (Non-Medical):    Physical Activity:     Days of Exercise per Week:     Minutes of Exercise per Session:    Stress:     Feeling of Stress :    Social Connections:     Frequency of Communication with Friends and Family:     Frequency of Social Gatherings with Friends and Family:     Attends [employer] Services:     Active Member of Clubs or Organizations:     Attends Club or Organization Meetings:     Marital Status:    Intimate Partner Violence:     Fear of Current or Ex-Partner:     Emotionally Abused:     Physically Abused:     Sexually Abused:      Current Facility-Administered Medications   Medication Dose Route Frequency Provider Last Rate Last Admin    Tetanus-Diphth-Acell Pertussis (BOOSTRIX) injection 0.5 mL  0.5 mL Intramuscular Once Jo Mcardle, MD         Current Outpatient Medications   Medication Sig Dispense Refill    furosemide (LASIX) 20 MG tablet Take 1 tablet by mouth daily 30 tablet 5    clopidogrel (PLAVIX) 75 MG tablet Take 1 tablet by mouth daily 90 tablet 3    lisinopril (PRINIVIL;ZESTRIL) 5 MG tablet Take 1 tablet by mouth daily 90 tablet 3    rosuvastatin (CRESTOR) 20 MG tablet Take 1 tablet by mouth daily 90 tablet 3    nitroGLYCERIN (NITROSTAT) 0.4 MG SL tablet Place 1 tablet under the tongue every 5 minutes as needed for Chest pain 25 tablet 3    Nebulizers (COMPRESSOR/NEBULIZER) MISC 1 each by Does not apply route daily 1 each 0    diclofenac sodium (VOLTAREN) 1 % GEL Apply 2 g topically 4 times daily 2 Tube 3    fluticasone-umeclidin-vilant (TRELEGY ELLIPTA) 100-62.5-25 MCG/INH AEPB Inhale 1 puff into the lungs daily 1 each 11    BREO ELLIPTA 100-25 MCG/INH AEPB inhaler Inhale 1 puff by mouth once daily 60 each 2    budesonide-formoterol (SYMBICORT) 160-4.5 MCG/ACT AERO Inhale 2 puffs into the lungs 2 times daily 1 Inhaler 5    betamethasone valerate (VALISONE) 0.1 % cream APPLY TOPICALLY TWO TIMES A DAY AS NEEDED 45 g 0    fluticasone-salmeterol (ADVAIR DISKUS) 500-50 MCG/DOSE diskus inhaler Inhale 1 puff into the lungs every 12 hours (Patient not taking: Reported on 5/21/2021) 2 each 0    aspirin 81 MG chewable tablet Take 1 tablet by mouth daily. 30 tablet      Allergies   Allergen Reactions    Atenolol Other (See Comments)     Disoriented        REVIEW OF SYSTEMS  10 systems reviewed, pertinent positives per HPI otherwise noted to be negative. PHYSICAL EXAM  BP (!) 180/94   Pulse 89   Temp 97.3 °F (36.3 °C) (Oral)   Resp 16   Ht 5' 2\" (1.575 m)   Wt 185 lb (83.9 kg)   SpO2 (!) 89%   BMI 33.84 kg/m²    GENERAL APPEARANCE: Awake and alert. Cooperative. No acute distress. HENT: Normocephalic. Atraumatic. NECK: Supple. EYES: PERRL. EOM's grossly intact. HEART/CHEST: RRR. No murmurs. LUNGS: Respirations unlabored. CTAB. Good air exchange. Speaking comfortably in full sentences. ABDOMEN: No tenderness. Soft. Non-distended. No masses. No organomegaly. No guarding or rebound. MUSCULOSKELETAL: No extremity edema. Compartments soft. No deformity. No tenderness in the extremities. All extremities neurovascularly intact. SKIN: Warm and dry. No acute rashes. Abrasion to the right forearm on the dorsal aspect, no active bleeding, no lacerations but skin tears present with moderate amount of missing skin. NEUROLOGICAL: Alert and oriented. CN's 2-12 intact. No gross facial drooping. Strength 5/5, sensation intact. PSYCHIATRIC: Normal mood and affect.     LABS  I have reviewed all labs for be present due to limitations of this technology and occasionally words are not transcribed correctly.        Jackelyn Aguilera MD  05/24/21 0482

## 2021-05-24 NOTE — ED NOTES
Discharge instructions reviewed with Ms. Estela Ruth. She verbalized understanding. Copy of discharge instructions and prescriptions given. Ms. Estela Ruth was discharged to home in good condition per personal vehicle, friend/family driving. She exited the ED without difficulty.         Freddy Lopez RN  05/24/21 [id number]

## 2021-06-10 ENCOUNTER — OFFICE VISIT (OUTPATIENT)
Dept: FAMILY MEDICINE CLINIC | Age: 61
End: 2021-06-10
Payer: COMMERCIAL

## 2021-06-10 VITALS
DIASTOLIC BLOOD PRESSURE: 84 MMHG | WEIGHT: 188 LBS | HEART RATE: 93 BPM | OXYGEN SATURATION: 84 % | BODY MASS INDEX: 34.39 KG/M2 | TEMPERATURE: 98 F | SYSTOLIC BLOOD PRESSURE: 137 MMHG

## 2021-06-10 DIAGNOSIS — I10 ESSENTIAL HYPERTENSION: ICD-10-CM

## 2021-06-10 DIAGNOSIS — R09.02 HYPOXIA: ICD-10-CM

## 2021-06-10 DIAGNOSIS — I25.110 CORONARY ARTERY DISEASE INVOLVING NATIVE CORONARY ARTERY OF NATIVE HEART WITH UNSTABLE ANGINA PECTORIS (HCC): ICD-10-CM

## 2021-06-10 DIAGNOSIS — J43.1 PANLOBULAR EMPHYSEMA (HCC): Primary | ICD-10-CM

## 2021-06-10 DIAGNOSIS — G47.33 OSA (OBSTRUCTIVE SLEEP APNEA): ICD-10-CM

## 2021-06-10 DIAGNOSIS — J45.30 MILD PERSISTENT ASTHMA WITHOUT COMPLICATION: ICD-10-CM

## 2021-06-10 PROCEDURE — 99214 OFFICE O/P EST MOD 30 MIN: CPT | Performed by: FAMILY MEDICINE

## 2021-06-10 NOTE — PATIENT INSTRUCTIONS
Please read the healthy family handout that you were given and share it with your family. Please compare this printed medication list with your medications at home to be sure they are the same. If you have any medications that are different please contact us immediately at [phone]. Also review your allergies that we have listed, these may also include medications that you have not been able to tolerate, make sure everything listed is correct. If you have any allergies that are different please contact us immediately at [phone].

## 2021-06-10 NOTE — PROGRESS NOTES
Subjective: Yolande Barba is here to discuss the following issues. She has emphysema. She continues to smoke. She has been slowly noting decreasing home oxygen levels. She feels secondarily very short of breath. No recent fever sweats chills discolored sputum production sore throat increased cough change in taste or smell. No increased wheezing or chest tightness. She has coronary artery disease and has had no chest pain. Her last visit with cardiology led to initiation of Lasix. This has helped her lower extremity swelling. She has mild asthma without related symptoms. She has obstructive sleep apnea but has declined CPAP repeatedly. She has essential hypertension and her blood pressures are well controlled  Social History     Tobacco Use   Smoking Status Current Every Day Smoker    Packs/day: 0.50    Years: 20.00    Pack years: 10.00    Types: Cigarettes   Smokeless Tobacco Never Used   Tobacco Comment    1/2 pack   Allergies:     Atenolol    Objective:  /84   Pulse 93   Temp 98 °F (36.7 °C) (Oral)   Wt 188 lb (85.3 kg)   SpO2 (!) 84%   BMI 34.39 kg/m²    No acute distress, heart regular rate and rhythm without murmur, lungs clear to auscultation easy effort, abdomen soft nondistended, no clubbing or cyanosis    Assessment:  1. Panlobular emphysema ([address]    2. Hypoxia    3. Coronary artery disease involving native coronary artery of native heart with unstable angina pectoris ([address]    4. Mild persistent asthma without complication    5. RYLIE (obstructive sleep apnea)    6. Essential hypertension            Plan:  Continue Lasix recently started by cardiology  BMP  Reviewed other recent favorable labs  Start home and portable oxygen. She will start at 2 L by nasal cannula.   Today at rest her oxygen level was 84%  Agree to continue FMLA allowing for up to 2 days/week off from work due to cardiopulmonary symptoms  Start monitoring home oxygen and titrate to oxygen level 90 to 92% Follow-up in 1-2 months or as needed  [facility]" provided  Avoid exposure to all tobacco products. Read and consider all information provided by the pharmacy regarding prescribed medications before use  Proper diet and weight management   Otherwise continue current treatment plan  Call or return if symptoms are not well controlled      All medical conditions for this patient are stable unless otherwise indicated    Henny Monge MD    This note was transcribed using a voice recognition software system. Proper technique and careful oversight were used to increase transcription accuracy but inadvertent errors may be present.

## 2021-06-11 LAB
ANION GAP SERPL CALCULATED.3IONS-SCNC: 12 MMOL/L (ref 3–16)
BUN BLDV-MCNC: 14 MG/DL (ref 7–20)
CALCIUM SERPL-MCNC: 9.8 MG/DL (ref 8.3–10.6)
CHLORIDE BLD-SCNC: 100 MMOL/L (ref 99–110)
CO2: 28 MMOL/L (ref 21–32)
CREAT SERPL-MCNC: 0.9 MG/DL (ref 0.6–1.1)
GFR AFRICAN AMERICAN: >60
GFR NON-AFRICAN AMERICAN: >60
GLUCOSE BLD-MCNC: 111 MG/DL (ref 70–99)
POTASSIUM SERPL-SCNC: 4.7 MMOL/L (ref 3.5–5.1)
SODIUM BLD-SCNC: 140 MMOL/L (ref 136–145)

## 2021-07-07 ENCOUNTER — TELEPHONE (OUTPATIENT)
Dept: FAMILY MEDICINE CLINIC | Age: 61
End: 2021-07-07

## 2021-08-19 RX ORDER — FLUTICASONE FUROATE, UMECLIDINIUM BROMIDE AND VILANTEROL TRIFENATATE 100; 62.5; 25 UG/1; UG/1; UG/1
POWDER RESPIRATORY (INHALATION)
Qty: 60 EACH | Refills: 0 | Status: SHIPPED | OUTPATIENT
Start: 2021-08-19 | End: 2021-09-16

## 2021-08-24 ENCOUNTER — OFFICE VISIT (OUTPATIENT)
Dept: CARDIOLOGY CLINIC | Age: 61
End: 2021-08-24
Payer: COMMERCIAL

## 2021-08-24 VITALS
OXYGEN SATURATION: 93 % | HEIGHT: 62 IN | BODY MASS INDEX: 34.23 KG/M2 | DIASTOLIC BLOOD PRESSURE: 62 MMHG | HEART RATE: 89 BPM | SYSTOLIC BLOOD PRESSURE: 110 MMHG | WEIGHT: 186 LBS

## 2021-08-24 DIAGNOSIS — J43.1 PANLOBULAR EMPHYSEMA (HCC): ICD-10-CM

## 2021-08-24 DIAGNOSIS — E78.2 MIXED HYPERLIPIDEMIA: ICD-10-CM

## 2021-08-24 DIAGNOSIS — I25.110 CORONARY ARTERY DISEASE INVOLVING NATIVE CORONARY ARTERY OF NATIVE HEART WITH UNSTABLE ANGINA PECTORIS (HCC): Primary | ICD-10-CM

## 2021-08-24 DIAGNOSIS — G47.33 OSA (OBSTRUCTIVE SLEEP APNEA): ICD-10-CM

## 2021-08-24 DIAGNOSIS — Z72.0 TOBACCO ABUSE: ICD-10-CM

## 2021-08-24 DIAGNOSIS — I10 ESSENTIAL HYPERTENSION: ICD-10-CM

## 2021-08-24 PROCEDURE — 99213 OFFICE O/P EST LOW 20 MIN: CPT | Performed by: NURSE PRACTITIONER

## 2021-08-24 NOTE — PROGRESS NOTES
[facility]   Cardiac Evaluation    Primary Care Doctor: Jeremy Rodgers MD    Chief Complaint   Patient presents with    3 Month Follow-Up    Edema    Shortness of Breath      Assessment:    1. Coronary artery disease involving native coronary artery of native heart with unstable angina pectoris ([facility] [address]    2. Essential hypertension    3. Mixed hyperlipidemia    4. Tobacco abuse    5. RYLIE (obstructive sleep apnea)    6. Panlobular emphysema ([facility] [address]        Plan:   1. No change in current heart medicines  2. Have fasting blood work at time of next appointment with Jeremy Rodgers MD or next blood draw  3. Follow up with me in 6 months (or sooner if needed)      Vitals:    08/24/21 0850   BP: 110/62   Pulse: 89   SpO2: 93%   Weight: 186 lb (84.4 kg)   Height: 5' 2\" (1.575 m)       History of Present Illness:   I had the pleasure of seeing Fitz Painting in follow up for CAD s/p PCI to RCA (2014, STEMI), LCx (2014) and LAD (2017), HTN, HLD as well as COPD and smoker. At last visit we added Lasix 20 mg daily for dyspnea on exertion, edema and cough. She had blood work in June which is reviewed and stable. Also of note she was started on oxygen per her family physician for hypoxemia in the office. She is doing fair. Work is busy but she is tolerating well. She complains of of some fatigue. She still has shortness of breath. She did not get the oxygen due to cost.  She just got her a pulse oximeter but has not been checking it. Also out of her nebulizer medicine. She feels has been wheezing more with the heat and humidity. She complains of pressure under left eye. Fitz Painting describes symptoms including dyspnea, fatigue, edema but denies chest pain, palpitations, orthopnea, PND, early saiety, syncope.        NYHA:   II  ACC/ AHA Stage:    C    Past Medical History:   has a past medical history of Asthma, CAD (coronary artery disease), Depression, HTN, Hyperglycemia, and Hyperlipidemia. Surgical History:   has a past surgical history that includes Breast biopsy; Coronary angioplasty with stent (03/07/2014); Coronary angioplasty with stent (03/26/2014); and Coronary angioplasty with stent (12/04/2017). Social History:   reports that she has been smoking cigarettes. She has a 10.00 pack-year smoking history. She has never used smokeless tobacco. She reports that she does not drink alcohol and does not use drugs. Family History:   Family History   Problem Relation Age of Onset    Heart Attack Mother     Heart Disease Mother     Diabetes Mother     Heart Failure Father     Heart Disease Father        Home Medications:  Prior to Admission medications    Medication Sig Start Date End Date Taking? Authorizing Provider   Alice Garcia ELLIPTA 100-62.5-25 MCG/INH AEPB Inhale 1 puff by mouth once daily 8/19/21  Yes Christopher Farrell MD   furosemide (LASIX) 20 MG tablet Take 1 tablet by mouth daily 5/21/21  Yes MARY Krishnamurthy CNP   clopidogrel (PLAVIX) 75 MG tablet Take 1 tablet by mouth daily 1/28/21  Yes MARY Krishnamurthy CNP   lisinopril (PRINIVIL;ZESTRIL) 5 MG tablet Take 1 tablet by mouth daily 1/28/21  Yes MARY Krishnamurthy CNP   nitroGLYCERIN (NITROSTAT) 0.4 MG SL tablet Place 1 tablet under the tongue every 5 minutes as needed for Chest pain 1/28/21  Yes MARY Krishnamurthy CNP   diclofenac sodium (VOLTAREN) 1 % GEL Apply 2 g topically 4 times daily 8/20/20  Yes Nusrat Zimmerman,    betamethasone valerate (VALISONE) 0.1 % cream APPLY TOPICALLY TWO TIMES A DAY AS NEEDED 8/10/18  Yes Christopher Farrell MD   aspirin 81 MG chewable tablet Take 1 tablet by mouth daily.  3/27/14  Yes MARY Christianson CNP   rosuvastatin (CRESTOR) 20 MG tablet Take 1 tablet by mouth daily  Patient not taking: Reported on 8/24/2021 1/28/21   MARY Krishnamurthy CNP   Nebulizers (COMPRESSOR/NEBULIZER) MISC 1 each by Does not apply route daily  Patient not taking: Reported on 8/24/2021 8/25/20   Delmi Hicks MD   BREO ELLIPTA 100-25 MCG/INH AEPB inhaler Inhale 1 puff by mouth once daily  Patient not taking: Reported on 6/10/2021 5/7/20   Delmi Hicks MD   budesonide-formoterol [facility]) 160-4.5 MCG/ACT AERO Inhale 2 puffs into the lungs 2 times daily  Patient not taking: Reported on 6/10/2021 3/10/20   Delmi Hicks MD   fluticasone-salmeterol (ADVAIR DISKUS) 500-50 MCG/DOSE diskus inhaler Inhale 1 puff into the lungs every 12 hours  Patient not taking: Reported on 5/21/2021 3/14/18   Delmi Hicks MD        Allergies:  Atenolol     Physical Examination:    Vitals:    08/24/21 0850   BP: 110/62   Pulse: 89   SpO2: 93%   Weight: 186 lb (84.4 kg)   Height: 5' 2\" (1.575 m)     Constitutional and General Appearance: Warm and dry, no apparent distress, normal coloration  HEENT:  Normocephalic, atraumatic  Respiratory:  · Normal excursion and expansion without use of accessory muscles  · Resp Auscultation: diminished, no R/R/W  Cardiovascular:  · The apical impulses not displaced  · Heart tones are crisp and normal  · JVP 8 cm H2O  · Regular rate and rhythm, normal S1S2, no m/g/r  · Peripheral pulses are symmetrical and full  · There is no clubbing, cyanosis of the extremities.   · No BLE edema  · Pedal Pulses: 2+ and equal   Abdomen:  · No masses or tenderness  · Liver/Spleen: No Abnormalities Noted  Neurological/Psychiatric:  · Alert and oriented in all spheres  · Moves all extremities well  · Exhibits normal gait balance and coordination  · No abnormalities of mood, affect, memory, mentation, or behavior are noted    Lab Data:  Most recent lab results below reviewed in office    CBC:   Lab Results   Component Value Date    WBC 8.5 03/08/2021    WBC 9.5 12/05/2017    WBC 9.1 12/04/2017    RBC 5.46 03/08/2021    RBC 4.83 12/05/2017    RBC 5.41 12/04/2017    HGB 16.8 03/08/2021    HGB 14.7 12/05/2017    HGB 16.3 12/04/2017    HCT 50.9 03/08/2021    HCT 44.3 12/05/2017    HCT 49.4 12/04/2017    MCV 93.1 03/08/2021    MCV 91.8 12/05/2017    MCV 91.3 12/04/2017    RDW 15.2 03/08/2021    RDW 15.1 12/05/2017    RDW 15.1 12/04/2017     03/08/2021     12/05/2017     12/04/2017     BMP:  Lab Results   Component Value Date     06/10/2021     03/08/2021     10/28/2020    K 4.7 06/10/2021    K 5.0 03/08/2021    K 4.6 10/28/2020     06/10/2021     03/08/2021     10/28/2020    CO2 28 06/10/2021    CO2 24 03/08/2021    CO2 27 10/28/2020    BUN 14 06/10/2021    BUN 13 03/08/2021    BUN 8 10/28/2020    CREATININE 0.9 06/10/2021    CREATININE 0.7 03/08/2021    CREATININE 0.7 10/28/2020     BNP: No results found for: PROBNP  LIPID:   Lab Results   Component Value Date    TRIG 114 08/04/2020    TRIG 119 09/28/2018    HDL 44 08/04/2020    HDL 47 09/28/2018    HDL 42 01/21/2011    HDL 43 10/21/2010    LDLCALC 85 08/04/2020    LDLCALC 68 09/28/2018    LDLDIRECT 239 06/17/2010       Cardiac Imaging:     Stress Test 9/13/19   Summary  Technically difficult study due to significant bowel uptake  Within limitations of this study, normal LVEF and wall motion  No clear areas of ischemia      LEFT HEART CATH 12/4/17:   LM: luminals  LAD: mid section of disease with a very tight focal 90% in setting of surrounding 70% calcified lesion  LCX: 20% ostial              OM1- ostial 20%              OM2- patent mid stent  RCA: dominant, mid RCA   LVEDP:20  LVEF: 65%   PCI of mid LAD  Stent: Xience alpine 2.5 x 18mm post dilated to 2.64mm   Assessment  1. Successful PCI to mid LAD using xience drug stent              90%--> 0%  2. ASA 81mg qday for life, plavix 75mg po qday for 1 yr w/o inturruption  3. BB, statin    Limited Echo: 12/30/2014  Last echo on 3/8/14 showed EF 45-50% with inferior wall hypokinesis. Compared to previous echo, there has been some slight improvement in wall motion.   Normal left ventricle size, wall thickness and systolic function with an  estimated ejection fraction of 55-60%. Mild inferolateral and apical  inferior hypokinesis. Diastolic filling parameters suggests normal diastolic  function. Stress Test: 2/27/14  Moderate sized focus of stress induced myocardial ischemia involving the inferior wall of the left ventricle at the mid heart. ECG correlation is recommended. 2. LVEF 62% + breast attentuation     CATH 3/7/2014  [facility] SUMMARY   ~LM normal   ~LAD Mid 80%   ~Cx Mid 70% focal   ~RCA Mid 100%, L->R collaterals   ~LVG 45% with inferior hk   Impression   ~Angiography w/ multivessel disease   ~LVEDP 18   ~LVG with depressed EF and inferior hypokinesis   Intervention   ~PCI of 100% RCA with [id number]   ~Staged PCI of Cx and LAD recommended     CATH 3/25/2014  1. Left main coronary artery was small, but had no angiographic evidence of   disease, bifurcating into the LAD and left circumflex. 2. The LAD had a 70% lesion noted within the mid LAD just after a septal   branch. This was negative on the stress test with no evidence of ischemia   whatsoever and therefore left alone at this time. 3. The left circumflex was short. It had a large OM 1 branch that acted   more like a ramus that had very mild disease throughout its course. Just   after this, there was a very short focal segment of 70% to 75% stenosis in   the mid circumflex. This was the lesion that was intervened upon at this   time. 4. The right coronary artery was noted to be dominant. It had a patent   stent that was present in the mid RCA with no evidence of complication. ASSESSMENT: Successful percutaneous intervention to the mid left circumflex   using Xience Xpedition 2.25-mm x 8-mm. The 75% stenosis was reduced to 0%   with no complication and maintenance of ELIGIO 3 flow.            I appreciate the opportunity of cooperating in the care of this individual.    Gucci Hsu, APRN - CNP, 8/24/2021, 8:58 AM

## 2021-08-24 NOTE — PATIENT INSTRUCTIONS
1. No change in current heart medicines  2. Have fasting blood work at time of next appointment with Elly Tran MD or next blood draw  3.  Follow up with me in 6 months (or sooner if needed)

## 2021-09-16 RX ORDER — FLUTICASONE FUROATE, UMECLIDINIUM BROMIDE AND VILANTEROL TRIFENATATE 100; 62.5; 25 UG/1; UG/1; UG/1
POWDER RESPIRATORY (INHALATION)
Qty: 60 EACH | Refills: 0 | Status: SHIPPED | OUTPATIENT
Start: 2021-09-16 | End: 2021-10-29

## 2021-10-12 ENCOUNTER — OFFICE VISIT (OUTPATIENT)
Dept: FAMILY MEDICINE CLINIC | Age: 61
End: 2021-10-12
Payer: COMMERCIAL

## 2021-10-12 VITALS
BODY MASS INDEX: 33.84 KG/M2 | OXYGEN SATURATION: 88 % | TEMPERATURE: 98.3 F | WEIGHT: 185 LBS | HEART RATE: 88 BPM | SYSTOLIC BLOOD PRESSURE: 134 MMHG | DIASTOLIC BLOOD PRESSURE: 83 MMHG

## 2021-10-12 DIAGNOSIS — I25.110 CORONARY ARTERY DISEASE INVOLVING NATIVE CORONARY ARTERY OF NATIVE HEART WITH UNSTABLE ANGINA PECTORIS (HCC): ICD-10-CM

## 2021-10-12 DIAGNOSIS — R09.02 HYPOXIA: ICD-10-CM

## 2021-10-12 DIAGNOSIS — R73.9 HYPERGLYCEMIA: ICD-10-CM

## 2021-10-12 DIAGNOSIS — J43.1 PANLOBULAR EMPHYSEMA (HCC): Primary | ICD-10-CM

## 2021-10-12 LAB
ALT SERPL-CCNC: 16 U/L (ref 10–40)
ANION GAP SERPL CALCULATED.3IONS-SCNC: 14 MMOL/L (ref 3–16)
BUN BLDV-MCNC: 11 MG/DL (ref 7–20)
CALCIUM SERPL-MCNC: 9.2 MG/DL (ref 8.3–10.6)
CHLORIDE BLD-SCNC: 104 MMOL/L (ref 99–110)
CHOLESTEROL, TOTAL: 146 MG/DL (ref 0–199)
CO2: 22 MMOL/L (ref 21–32)
CREAT SERPL-MCNC: 0.8 MG/DL (ref 0.6–1.1)
GFR AFRICAN AMERICAN: >60
GFR NON-AFRICAN AMERICAN: >60
GLUCOSE BLD-MCNC: 111 MG/DL (ref 70–99)
HDLC SERPL-MCNC: 44 MG/DL (ref 40–60)
LDL CHOLESTEROL CALCULATED: 80 MG/DL
POTASSIUM SERPL-SCNC: 4.7 MMOL/L (ref 3.5–5.1)
SODIUM BLD-SCNC: 140 MMOL/L (ref 136–145)
TRIGL SERPL-MCNC: 112 MG/DL (ref 0–150)
VLDLC SERPL CALC-MCNC: 22 MG/DL

## 2021-10-12 PROCEDURE — 99214 OFFICE O/P EST MOD 30 MIN: CPT | Performed by: FAMILY MEDICINE

## 2021-10-12 NOTE — PATIENT INSTRUCTIONS
Please read the healthy family handout that you were given and share it with your family. Please compare this printed medication list with your medications at home to be sure they are the same. If you have any medications that are different please contact us immediately at [phone]. Also review your allergies that we have listed, these may also include medications that you have not been able to tolerate, make sure everything listed is correct. If you have any allergies that are different please contact us immediately at [phone].

## 2021-10-12 NOTE — PROGRESS NOTES
Subjective: Tahir Butler is here to discuss the following issues. She has a long history of emphysema. Unfortunately she continues to smoke. No increase in her chronic severe shortness of breath. She uses albuterol nebulizer occasionally. She has hypoxemia and had some complications with her insurance when attempting to start on oxygen. Today room air oxygen is 88% and she has 92% on 2 L. She has coronary artery disease with no chest pain or pressure. She had a recent follow-up with cardiology. No medicine changes were initiated. She continues on Lasix with no edema. No orthopnea. She has hyperglycemia with no polydipsia polyuria  Social History     Tobacco Use   Smoking Status Current Every Day Smoker    Packs/day: 0.50    Years: 20.00    Pack years: 10.00    Types: Cigarettes   Smokeless Tobacco Never Used   Tobacco Comment    1/2 pack   Allergies:     Atenolol    Objective:  /83   Pulse 88   Temp 98.3 °F (36.8 °C) (Oral)   Wt 185 lb (83.9 kg)   SpO2 (!) 88%   BMI 33.84 kg/m²    No acute distress, heart regular rate and rhythm without murmur, lungs clear to auscultation easy effort, abdomen soft nondistended, no clubbing or cyanosis no edema    Assessment:  1. Panlobular emphysema ([address]    2. Hypoxia    3. Coronary artery disease involving native coronary artery of native heart with unstable angina pectoris ([address]    4. Hyperglycemia            Plan:  Labs ordered  Encouraged to get a flu shot and Covid vaccine  Start home and portable oxygen. She will start at 2 L by nasal cannula.   Today at rest her oxygen level was 88%  Agree to continue FMLA allowing for up to 2 days/week off from work due to cardiopulmonary symptoms  Start monitoring home oxygen and titrate to oxygen level 90 to 92%  Continue current medicines  Her   about 4 years ago  She has had 3 heart catheterizations with 3 stents  Follow-up in 3 months or as needed  The diagnoses listed in the assessment above are stable unless otherwise indicated. Age-specific preventative health recommendations were reviewed and the [facility]" was provided. Avoid exposure to tobacco products. Follow CDC guidelines for covid-19 prevention. Read and consider all information provided by the pharmacy regarding prescribed medications before use    Call or return for any problems that arise before the next scheduled appointment. Randy Osman MD    This note was transcribed using a voice recognition software system. Proper technique and careful oversight were used to increase transcription accuracy but inadvertent errors may be present.

## 2021-10-13 LAB
ESTIMATED AVERAGE GLUCOSE: 157.1 MG/DL
HBA1C MFR BLD: 7.1 %

## 2021-10-19 RX ORDER — ALBUTEROL SULFATE 2.5 MG/3ML
SOLUTION RESPIRATORY (INHALATION)
Qty: 375 ML | Refills: 5 | OUTPATIENT
Start: 2021-10-19

## 2021-10-27 ENCOUNTER — TELEPHONE (OUTPATIENT)
Dept: FAMILY MEDICINE CLINIC | Age: 61
End: 2021-10-27

## 2021-10-27 NOTE — TELEPHONE ENCOUNTER
----- Message from Rosmery Geigerotto sent at 10/27/2021  2:57 PM EDT -----  Subject: Message to Provider    QUESTIONS  Information for Provider? pt needs FMLA paperwork faxed to [employer]   ([employer]) current papers  10/28/2021 so needs ASAP please fax to   [phone] please call pt w/any questions [phone]   pt needs asap thank you   ---------------------------------------------------------------------------  --------------  [address]  What is the best way for the office to contact you? OK to leave message on   voicemail  Preferred Call Back Phone Number? [phone]  ---------------------------------------------------------------------------  --------------  SCRIPT ANSWERS  Relationship to Patient?  Self

## 2021-10-29 RX ORDER — FLUTICASONE FUROATE, UMECLIDINIUM BROMIDE AND VILANTEROL TRIFENATATE 100; 62.5; 25 UG/1; UG/1; UG/1
POWDER RESPIRATORY (INHALATION)
Qty: 60 EACH | Refills: 2 | Status: SHIPPED | OUTPATIENT
Start: 2021-10-29 | End: 2022-01-18

## 2021-11-29 RX ORDER — FUROSEMIDE 20 MG/1
TABLET ORAL
Qty: 90 TABLET | Refills: 1 | Status: SHIPPED | OUTPATIENT
Start: 2021-11-29 | End: 2022-05-17

## 2022-01-18 RX ORDER — FLUTICASONE FUROATE, UMECLIDINIUM BROMIDE AND VILANTEROL TRIFENATATE 100; 62.5; 25 UG/1; UG/1; UG/1
POWDER RESPIRATORY (INHALATION)
Qty: 60 EACH | Refills: 0 | Status: SHIPPED | OUTPATIENT
Start: 2022-01-18 | End: 2022-02-21

## 2022-01-21 ENCOUNTER — TELEPHONE (OUTPATIENT)
Dept: CARDIOLOGY CLINIC | Age: 62
End: 2022-01-21

## 2022-01-21 ENCOUNTER — TELEPHONE (OUTPATIENT)
Dept: FAMILY MEDICINE CLINIC | Age: 62
End: 2022-01-21

## 2022-01-21 NOTE — TELEPHONE ENCOUNTER
Patient had her covid booster on 1/5 and on 1/7 had to miss work because it gave her some symptoms of side effects with being sick. She needs to have papers filled out for her missed day. I put these on your desk.

## 2022-01-21 NOTE — TELEPHONE ENCOUNTER
Pt called and stated she has been experiencing pressure in her chest, blurry vision, a \"butterfly feeling in her chest\", SOB and has been very fatigued. No recent medication changes. Pt did mention she is under a lot of stress at work. Pt wanted to make sure [employer] was aware and get her recommendation. Last OV 8/24/2021 with [employer]. bupropion 150mg XL pended if approved

## 2022-01-21 NOTE — TELEPHONE ENCOUNTER
Pt called back and stated she changed her mind. She is uncomfortable going to ED.  Made OV on 01/24 with HERNANDO

## 2022-01-24 ENCOUNTER — OFFICE VISIT (OUTPATIENT)
Dept: CARDIOLOGY CLINIC | Age: 62
End: 2022-01-24
Payer: COMMERCIAL

## 2022-01-24 ENCOUNTER — TELEPHONE (OUTPATIENT)
Dept: CARDIOLOGY CLINIC | Age: 62
End: 2022-01-24

## 2022-01-24 VITALS
HEART RATE: 90 BPM | HEIGHT: 62 IN | SYSTOLIC BLOOD PRESSURE: 120 MMHG | BODY MASS INDEX: 34.14 KG/M2 | DIASTOLIC BLOOD PRESSURE: 64 MMHG | OXYGEN SATURATION: 100 % | WEIGHT: 185.5 LBS

## 2022-01-24 DIAGNOSIS — I10 ESSENTIAL HYPERTENSION: ICD-10-CM

## 2022-01-24 DIAGNOSIS — Z72.0 TOBACCO ABUSE: ICD-10-CM

## 2022-01-24 DIAGNOSIS — J43.1 PANLOBULAR EMPHYSEMA (HCC): ICD-10-CM

## 2022-01-24 DIAGNOSIS — I25.110 CORONARY ARTERY DISEASE INVOLVING NATIVE CORONARY ARTERY OF NATIVE HEART WITH UNSTABLE ANGINA PECTORIS (HCC): Primary | ICD-10-CM

## 2022-01-24 DIAGNOSIS — E78.2 MIXED HYPERLIPIDEMIA: ICD-10-CM

## 2022-01-24 PROCEDURE — 99214 OFFICE O/P EST MOD 30 MIN: CPT | Performed by: NURSE PRACTITIONER

## 2022-01-24 RX ORDER — ROSUVASTATIN CALCIUM 20 MG/1
20 TABLET, COATED ORAL DAILY
Qty: 90 TABLET | Refills: 3 | Status: SHIPPED | OUTPATIENT
Start: 2022-01-24

## 2022-01-24 RX ORDER — CLOPIDOGREL BISULFATE 75 MG/1
75 TABLET ORAL DAILY
Qty: 90 TABLET | Refills: 3 | Status: SHIPPED | OUTPATIENT
Start: 2022-01-24

## 2022-01-24 RX ORDER — LISINOPRIL 5 MG/1
5 TABLET ORAL DAILY
Qty: 90 TABLET | Refills: 3 | Status: SHIPPED | OUTPATIENT
Start: 2022-01-24 | End: 2022-02-21

## 2022-01-24 NOTE — PROGRESS NOTES
[facility]   Cardiac Evaluation    Primary Care Doctor: Oleg Rdz MD    Chief Complaint   Patient presents with    Follow-up    Shortness of Breath    Chest Pain     no pressure or pain, just an uneasy feeling    Fatigue        Assessment:    1. Coronary artery disease involving native coronary artery of native heart with unstable angina pectoris ([facility] [address]    2. Essential hypertension    3. Mixed hyperlipidemia    4. Tobacco abuse    5. Panlobular emphysema ([facility] [address]        Plan:   1. 2 week heart monitor  2. Stress test   3. Echocardiogram  4. Will call you with results and any further testing or treatment recommended  5. Follow up with me in 3 months         Vitals:    01/24/22 1430   BP: 120/64   Pulse: 90   SpO2: 100%   Weight: 185 lb 8 oz (84.1 kg)   Height: 5' 2\" (1.575 m)       History of Present Illness:   I had the pleasure of seeing Emelia Brooks in follow up for CAD s/p PCI to RCA (2014, STEMI), LCx (2014) and LAD (2017), HTN, HLD and smoker. She called last week with symptoms of chest pain and a fluttering in her chest.  She was encouraged to go to the hospital for further evaluation but she was uncomfortable doing so. Last blood work completed in October 2021 was stable notable for LDL of 80 and A1c of 7.1. She has had couple of episodes in past month of chest pain and lightheadedness. She has also had uneasy feeling in chest, at rest in evenings. Has had increasing shortness of breath recently, taking Mucinex. Coughing more lately. The lightheadedness (eyes go funny) is similar to here prior unstable angina episodes. These occur with activity but not overexertion. Also been sick to her stomach, on and off, short periods. Having headaches, more so since got her booster shot. The butterfly feeling in her chest is off and on, short lived, just like something is not right.  Not at same time as uncomfortable feeling in chest.     Markel Rubalcava describes symptoms including chest pain, dyspnea, palpitations, fatigue, vision changes but denies orthopnea, PND, early saiety, edema, syncope. NYHA:   IIb  ACC/ AHA Stage:    C    Past Medical History:   has a past medical history of Asthma, CAD (coronary artery disease), Depression, HTN, Hyperglycemia, and Hyperlipidemia. Surgical History:   has a past surgical history that includes Breast biopsy; Coronary angioplasty with stent (03/07/2014); Coronary angioplasty with stent (03/26/2014); and Coronary angioplasty with stent (12/04/2017). Social History:   reports that she has been smoking cigarettes. She has a 10.00 pack-year smoking history. She has never used smokeless tobacco. She reports that she does not drink alcohol and does not use drugs. Family History:   Family History   Problem Relation Age of Onset    Heart Attack Mother     Heart Disease Mother     Diabetes Mother     Heart Failure Father     Heart Disease Father        Home Medications:  Prior to Admission medications    Medication Sig Start Date End Date Taking?  Authorizing Provider   Hakeem Herring 100-62.5-25 MCG/INH AEPB INHALE 1 PUFF ONCE DAILY 1/18/22  Yes Sia Burns MD   furosemide (LASIX) 20 MG tablet Take 1 tablet by mouth once daily 11/29/21  Yes MARY Spann CNP   betamethasone valerate (VALISONE) 0.1 % cream APPLY TOPICALLY TWO TIMES A DAY AS NEEDED 10/12/21  Yes Sia Burns MD   albuterol (PROVENTIL) (5 MG/ML) 0.5% nebulizer solution 1 vial every 4 hours as needed for cough or wheezing 10/12/21  Yes Sia Burns MD   clopidogrel (PLAVIX) 75 MG tablet Take 1 tablet by mouth daily 1/28/21  Yes MARY Spann CNP   lisinopril (PRINIVIL;ZESTRIL) 5 MG tablet Take 1 tablet by mouth daily 1/28/21  Yes MARY Spann CNP   rosuvastatin (CRESTOR) 20 MG tablet Take 1 tablet by mouth daily 1/28/21  Yes MARY Spann CNP   nitroGLYCERIN (NITROSTAT) 0.4 MG SL tablet Place 1 tablet under the tongue every 5 minutes as needed for Chest pain 1/28/21  Yes MARY Colorado CNP   diclofenac sodium (VOLTAREN) 1 % GEL Apply 2 g topically 4 times daily 8/20/20  Yes Ellis Garrisonk Zimmerman, DO   aspirin 81 MG chewable tablet Take 1 tablet by mouth daily. 3/27/14  Yes MARY Ruiz CNP        Allergies:  Atenolol     Physical Examination:    Vitals:    01/24/22 1430   BP: 120/64   Pulse: 90   SpO2: 100%   Weight: 185 lb 8 oz (84.1 kg)   Height: 5' 2\" (1.575 m)     Constitutional and General Appearance: Warm and dry, no apparent distress, normal coloration  HEENT:  Normocephalic, atraumatic  Respiratory:  · Normal excursion and expansion without use of accessory muscles  · Resp Auscultation: course t/o posteriorly bilaterally, few wheezes  Cardiovascular:  · The apical impulses not displaced  · Heart tones are crisp and normal  · JVP 8 cm H2O  · Regular rate and rhythm, normal S1S2, no m/g/r  · Peripheral pulses are symmetrical and full  · There is no clubbing, cyanosis of the extremities.   · No BLE edema  · Pedal Pulses: 2+ and equal   Abdomen:  · No masses or tenderness  · Liver/Spleen: No Abnormalities Noted  Neurological/Psychiatric:  · Alert and oriented in all spheres  · Moves all extremities well  · Exhibits normal gait balance and coordination  · No abnormalities of mood, affect, memory, mentation, or behavior are noted    Lab Data:  Most recent lab results below reviewed in office    CBC:   Lab Results   Component Value Date    WBC 8.5 03/08/2021    WBC 9.5 12/05/2017    WBC 9.1 12/04/2017    RBC 5.46 03/08/2021    RBC 4.83 12/05/2017    RBC 5.41 12/04/2017    HGB 16.8 03/08/2021    HGB 14.7 12/05/2017    HGB 16.3 12/04/2017    HCT 50.9 03/08/2021    HCT 44.3 12/05/2017    HCT 49.4 12/04/2017    MCV 93.1 03/08/2021    MCV 91.8 12/05/2017    MCV 91.3 12/04/2017    RDW 15.2 03/08/2021    RDW 15.1 12/05/2017    RDW 15.1 12/04/2017     03/08/2021     12/05/2017     12/04/2017 BMP:  Lab Results   Component Value Date     10/12/2021     06/10/2021     03/08/2021    K 4.7 10/12/2021    K 4.7 06/10/2021    K 5.0 03/08/2021     10/12/2021     06/10/2021     03/08/2021    CO2 22 10/12/2021    CO2 28 06/10/2021    CO2 24 03/08/2021    BUN 11 10/12/2021    BUN 14 06/10/2021    BUN 13 03/08/2021    CREATININE 0.8 10/12/2021    CREATININE 0.9 06/10/2021    CREATININE 0.7 03/08/2021     BNP: No results found for: PROBNP  LIPID:   Lab Results   Component Value Date    TRIG 112 10/12/2021    TRIG 114 08/04/2020    HDL 44 10/12/2021    HDL 44 08/04/2020    HDL 42 01/21/2011    HDL 43 10/21/2010    LDLCALC 80 10/12/2021    LDLCALC 85 08/04/2020    LDLDIRECT 239 06/17/2010       Cardiac Imaging:     Stress Test 9/13/19   Summary  Technically difficult study due to significant bowel uptake  Within limitations of this study, normal LVEF and wall motion  No clear areas of ischemia      LEFT HEART CATH 12/4/17:   LM: luminals  LAD: mid section of disease with a very tight focal 90% in setting of surrounding 70% calcified lesion  LCX: 20% ostial              OM1- ostial 20%              OM2- patent mid stent  RCA: dominant, mid RCA     LVEDP:20  LVEF: 65%     PCI of mid LAD  Stent: Xience alpine 2.5 x 18mm post dilated to 2.64mm     Assessment  1. Successful PCI to mid LAD using xience drug stent              90%--> 0%  2. ASA 81mg qday for life, plavix 75mg po qday for 1 yr w/o inturruption  3. BB, statin      Limited Echo: 12/30/2014  Last echo on 3/8/14 showed EF 45-50% with inferior wall hypokinesis. Compared to previous echo, there has been some slight improvement in wall motion. Normal left ventricle size, wall thickness and systolic function with an  estimated ejection fraction of 55-60%. Mild inferolateral and apical  inferior hypokinesis. Diastolic filling parameters suggests normal diastolic  function.          Stress Test: 2/27/14  Moderate sized focus of stress induced myocardial ischemia involving the inferior wall of the left ventricle at the mid heart. ECG correlation is recommended. 2. LVEF 62% + breast attentuation     CATH 3/7/2014  [facility] SUMMARY   ~LM normal   ~LAD Mid 80%   ~Cx Mid 70% focal   ~RCA Mid 100%, L->R collaterals   ~LVG 45% with inferior hk   Impression   ~Angiography w/ multivessel disease   ~LVEDP 18   ~LVG with depressed EF and inferior hypokinesis   Intervention   ~PCI of 100% RCA with [id number]   ~Staged PCI of Cx and LAD recommended     CATH 3/25/2014  1. Left main coronary artery was small, but had no angiographic evidence of   disease, bifurcating into the LAD and left circumflex. 2. The LAD had a 70% lesion noted within the mid LAD just after a septal   branch. This was negative on the stress test with no evidence of ischemia   whatsoever and therefore left alone at this time. 3. The left circumflex was short. It had a large OM 1 branch that acted   more like a ramus that had very mild disease throughout its course. Just   after this, there was a very short focal segment of 70% to 75% stenosis in   the mid circumflex. This was the lesion that was intervened upon at this   time. 4. The right coronary artery was noted to be dominant. It had a patent   stent that was present in the mid RCA with no evidence of complication. ASSESSMENT: Successful percutaneous intervention to the mid left circumflex   using Xience Xpedition 2.25-mm x 8-mm. The 75% stenosis was reduced to 0%   with no complication and maintenance of ELIGIO 3 flow.            I appreciate the opportunity of cooperating in the care of this individual.    MARY Zeng CNP, 1/24/2022, 2:39 PM

## 2022-01-24 NOTE — PATIENT INSTRUCTIONS
1. 2 week heart monitor  2. Stress test   3. Echocardiogram  4. Will call you with results and any further testing or treatment recommended  5.  Follow up with me in 3 months

## 2022-01-24 NOTE — TELEPHONE ENCOUNTER
Monitor placed by Delores Wright RN  Monitor company [employer]  Length of monitor 14 days  Monitor ordered by ANGIE NP  Serial number [id number]  Kit ID [id number]  Activation successful prior to pt leaving office?  Yes

## 2022-02-08 ENCOUNTER — HOSPITAL ENCOUNTER (OUTPATIENT)
Dept: NUCLEAR MEDICINE | Age: 62
Discharge: HOME | End: 2022-02-08
Payer: COMMERCIAL

## 2022-02-08 ENCOUNTER — TELEPHONE (OUTPATIENT)
Dept: CARDIOLOGY CLINIC | Age: 62
End: 2022-02-08

## 2022-02-08 ENCOUNTER — HOSPITAL ENCOUNTER (OUTPATIENT)
Dept: NON INVASIVE DIAGNOSTICS | Age: 62
Discharge: HOME | End: 2022-02-08
Payer: COMMERCIAL

## 2022-02-08 DIAGNOSIS — E78.2 MIXED HYPERLIPIDEMIA: ICD-10-CM

## 2022-02-08 DIAGNOSIS — I10 ESSENTIAL HYPERTENSION: ICD-10-CM

## 2022-02-08 DIAGNOSIS — I25.110 CORONARY ARTERY DISEASE INVOLVING NATIVE CORONARY ARTERY OF NATIVE HEART WITH UNSTABLE ANGINA PECTORIS (HCC): ICD-10-CM

## 2022-02-08 LAB
LV EF: 55 %
LV EF: 60 %
LVEF MODALITY: NORMAL
LVEF MODALITY: NORMAL

## 2022-02-08 PROCEDURE — 78452 HT MUSCLE IMAGE SPECT MULT: CPT

## 2022-02-08 PROCEDURE — A9502 TC99M TETROFOSMIN: HCPCS | Performed by: NURSE PRACTITIONER

## 2022-02-08 PROCEDURE — 3430000000 HC RX DIAGNOSTIC RADIOPHARMACEUTICAL: Performed by: NURSE PRACTITIONER

## 2022-02-08 PROCEDURE — 93306 TTE W/DOPPLER COMPLETE: CPT

## 2022-02-08 PROCEDURE — 6360000002 HC RX W HCPCS: Performed by: NURSE PRACTITIONER

## 2022-02-08 PROCEDURE — 93017 CV STRESS TEST TRACING ONLY: CPT

## 2022-02-08 RX ADMIN — REGADENOSON 0.4 MG: 0.08 INJECTION, SOLUTION INTRAVENOUS at 09:35

## 2022-02-08 RX ADMIN — TETROFOSMIN 33.1 MILLICURIE: 1.38 INJECTION, POWDER, LYOPHILIZED, FOR SOLUTION INTRAVENOUS at 09:35

## 2022-02-08 RX ADMIN — TETROFOSMIN 11.2 MILLICURIE: 1.38 INJECTION, POWDER, LYOPHILIZED, FOR SOLUTION INTRAVENOUS at 08:28

## 2022-02-08 NOTE — TELEPHONE ENCOUNTER
----- Message from MARY Johnson CNP sent at 2/8/2022  1:53 PM EST -----  Her stress test and echocardiogram both look good, no discrete abnormalities. Also waiting on her 2 week monitor to be completed. No new recommendations at this time.    Thank you,   MARY Johnson CNP

## 2022-02-08 NOTE — TELEPHONE ENCOUNTER
Created telephone encounter. [facility] requesting a call back to the office.

## 2022-02-14 PROCEDURE — 93272 ECG/REVIEW INTERPRET ONLY: CPT | Performed by: INTERNAL MEDICINE

## 2022-02-15 DIAGNOSIS — R00.2 PALPITATION: ICD-10-CM

## 2022-02-15 DIAGNOSIS — E78.2 MIXED HYPERLIPIDEMIA: ICD-10-CM

## 2022-02-15 DIAGNOSIS — I10 ESSENTIAL HYPERTENSION: ICD-10-CM

## 2022-02-15 DIAGNOSIS — I25.110 CORONARY ARTERY DISEASE INVOLVING NATIVE CORONARY ARTERY OF NATIVE HEART WITH UNSTABLE ANGINA PECTORIS (HCC): ICD-10-CM

## 2022-02-15 DIAGNOSIS — R42 DIZZINESS: ICD-10-CM

## 2022-02-15 PROCEDURE — 93272 ECG/REVIEW INTERPRET ONLY: CPT | Performed by: NURSE PRACTITIONER

## 2022-02-17 DIAGNOSIS — I49.3 PREMATURE VENTRICULAR CONTRACTIONS: Primary | ICD-10-CM

## 2022-02-17 NOTE — TELEPHONE ENCOUNTER
----- Message from MARY Clarke CNP sent at 2/16/2022  4:57 PM EST -----  Please call patient and let her know her cardiac monitor has been read by electrophysiologist with predominately normal sinus rhythm but brief episodes of SVT, and intermittent PVC's. Her average HR was 99 bpm.  I recommend starting back on a low dose beta blocker like metoprolol (Toprol) 25 mg once daily. Please call patient and let me know if she is willing to try this.    Thank you,    MARY Clarke CNP

## 2022-02-21 DIAGNOSIS — I10 ESSENTIAL HYPERTENSION: Primary | ICD-10-CM

## 2022-02-21 RX ORDER — LISINOPRIL 5 MG/1
TABLET ORAL
Qty: 90 TABLET | Refills: 1 | Status: SHIPPED | OUTPATIENT
Start: 2022-02-21 | End: 2022-08-08 | Stop reason: SDUPTHER

## 2022-02-21 RX ORDER — FLUTICASONE FUROATE, UMECLIDINIUM BROMIDE AND VILANTEROL TRIFENATATE 100; 62.5; 25 UG/1; UG/1; UG/1
POWDER RESPIRATORY (INHALATION)
Qty: 60 EACH | Refills: 0 | Status: SHIPPED | OUTPATIENT
Start: 2022-02-21 | End: 2022-03-28

## 2022-02-21 NOTE — TELEPHONE ENCOUNTER
Pt/pharmacy reqeusting lisinopril 5 mg tab. Pending script sent to [address]       Last OV 1/24/22  Next OV 4/25/22

## 2022-02-21 NOTE — TELEPHONE ENCOUNTER
Future appt scheduled 04/25/2022                Last appt 10/12/2021    Last Written     Abelardo Low 100-62.5-25 MCG/INH AEPB  01/18/2022  #60  0 RF

## 2022-02-24 RX ORDER — METOPROLOL SUCCINATE 25 MG/1
25 TABLET, EXTENDED RELEASE ORAL DAILY
Qty: 30 TABLET | Refills: 1 | Status: SHIPPED | OUTPATIENT
Start: 2022-02-24 | End: 2022-04-25 | Stop reason: SDUPTHER

## 2022-02-24 NOTE — TELEPHONE ENCOUNTER
Spoke to pt and relayed NPDD'S message. V/U. Pt is agreeable to starting Metoprolol 25 mg once daily.  [facility] @ [phone]

## 2022-03-28 RX ORDER — FLUTICASONE FUROATE, UMECLIDINIUM BROMIDE AND VILANTEROL TRIFENATATE 100; 62.5; 25 UG/1; UG/1; UG/1
POWDER RESPIRATORY (INHALATION)
Qty: 60 EACH | Refills: 1 | Status: SHIPPED | OUTPATIENT
Start: 2022-03-28 | End: 2022-05-17

## 2022-03-29 DIAGNOSIS — F17.200 SMOKER: Primary | ICD-10-CM

## 2022-04-06 ENCOUNTER — HOSPITAL ENCOUNTER (OUTPATIENT)
Dept: CT IMAGING | Age: 62
Discharge: HOME | End: 2022-04-06
Payer: COMMERCIAL

## 2022-04-06 DIAGNOSIS — F17.200 SMOKER: ICD-10-CM

## 2022-04-06 PROCEDURE — 71271 CT THORAX LUNG CANCER SCR C-: CPT

## 2022-04-25 ENCOUNTER — OFFICE VISIT (OUTPATIENT)
Dept: CARDIOLOGY CLINIC | Age: 62
End: 2022-04-25
Payer: COMMERCIAL

## 2022-04-25 VITALS
WEIGHT: 188.5 LBS | BODY MASS INDEX: 34.69 KG/M2 | OXYGEN SATURATION: 90 % | SYSTOLIC BLOOD PRESSURE: 118 MMHG | DIASTOLIC BLOOD PRESSURE: 66 MMHG | HEART RATE: 104 BPM | HEIGHT: 62 IN

## 2022-04-25 DIAGNOSIS — I49.3 PREMATURE VENTRICULAR CONTRACTIONS: ICD-10-CM

## 2022-04-25 DIAGNOSIS — Z72.0 TOBACCO ABUSE: ICD-10-CM

## 2022-04-25 DIAGNOSIS — I10 ESSENTIAL HYPERTENSION: ICD-10-CM

## 2022-04-25 DIAGNOSIS — J43.1 PANLOBULAR EMPHYSEMA (HCC): ICD-10-CM

## 2022-04-25 DIAGNOSIS — I25.110 CORONARY ARTERY DISEASE INVOLVING NATIVE CORONARY ARTERY OF NATIVE HEART WITH UNSTABLE ANGINA PECTORIS (HCC): Primary | ICD-10-CM

## 2022-04-25 DIAGNOSIS — G47.33 OSA (OBSTRUCTIVE SLEEP APNEA): ICD-10-CM

## 2022-04-25 DIAGNOSIS — E78.2 MIXED HYPERLIPIDEMIA: ICD-10-CM

## 2022-04-25 PROCEDURE — 99214 OFFICE O/P EST MOD 30 MIN: CPT | Performed by: NURSE PRACTITIONER

## 2022-04-25 RX ORDER — METOPROLOL SUCCINATE 25 MG/1
25 TABLET, EXTENDED RELEASE ORAL DAILY
Qty: 30 TABLET | Refills: 5 | Status: SHIPPED | OUTPATIENT
Start: 2022-04-25 | End: 2022-08-22

## 2022-04-25 NOTE — PATIENT INSTRUCTIONS
1. Toprol XL 25 mg once daily  2. No change in other heart medicines  3. No need for blood work or other testing  4.  Follow up with me in 3-4 months

## 2022-04-25 NOTE — PROGRESS NOTES
[facility]   Cardiac Evaluation    Primary Care Doctor: Kristen Melendez MD    Chief Complaint   Patient presents with    3 Month Follow-Up    Hyperlipidemia    Coronary Artery Disease    Palpitations     Pt reports \"flopping like a fish out of water\" for past 3 days         Assessment:    1. Coronary artery disease involving native coronary artery of native heart with unstable angina pectoris ([facility] [address]    2. Essential hypertension    3. Mixed hyperlipidemia    4. Tobacco abuse    5. Panlobular emphysema ([facility] [address]    6. RYLIE (obstructive sleep apnea)        Plan:   1. Toprol XL 25 mg once daily  2. No change in other heart medicines  3. No need for blood work or other testing  4. Follow up with me in 3-4 months    Vitals:    04/25/22 1253   BP: 118/66   Pulse: 104   SpO2: 90%   Weight: 188 lb 8 oz (85.5 kg)   Height: 5' 2\" (1.575 m)       History of Present Illness:   I had the pleasure of seeing Nancy Ortega in follow up for CAD s/p PCI to RCA (2014, STEMI), LCx (2014) and LAD (2017), HTN, HLD and smoker. January she reported symptoms of chest pain and palpitations as well as nausea. She underwent further evaluation with stress test (no ischemia, hypertension), echocardiogram (stable), 2-week event monitor (brief SVT and mild tachycardia). She was started on Toprol-XL 25 mg once daily. She is having palpitations, more so in last week. She states she lost the new metoprolol, thinks she knocked off counter into the trash. She never tried it. No chest pain just the pulsations. Work is getting busy and is stressful. Sister was hospitalized for hernia surgery then developed collapsed lung and new afib. She is now on metoprolol. Nancy Ortega describes symptoms including chest pain, dyspnea, palpitations, fatigue but denies orthopnea, PND, early saiety, edema, syncope.      NYHA:   II  ACC/ AHA Stage:    C    Past Medical History:   has a past medical history of Asthma, CAD (coronary artery disease), Depression, HTN, Hyperglycemia, and Hyperlipidemia. Surgical History:   has a past surgical history that includes Breast biopsy; Coronary angioplasty with stent (03/07/2014); Coronary angioplasty with stent (03/26/2014); and Coronary angioplasty with stent (12/04/2017). Social History:   reports that she has been smoking cigarettes. She has a 16.00 pack-year smoking history. She has never used smokeless tobacco. She reports that she does not drink alcohol and does not use drugs. Family History:   Family History   Problem Relation Age of Onset    Heart Attack Mother     Heart Disease Mother     Diabetes Mother     Heart Failure Father     Heart Disease Father        Home Medications:  Prior to Admission medications    Medication Sig Start Date End Date Taking?  Authorizing Provider   [place] 100-62.5-25 MCG/INH AEPB Inhale 1 puff by mouth once daily 3/28/22  Yes Thea Abarca MD   metoprolol succinate (TOPROL XL) 25 MG extended release tablet Take 1 tablet by mouth daily 2/24/22  Yes MARY Chao CNP   lisinopril (PRINIVIL;ZESTRIL) 5 MG tablet Take 1 tablet by mouth once daily 2/21/22  Yes MARY Chao CNP   rosuvastatin (CRESTOR) 20 MG tablet Take 1 tablet by mouth daily 1/24/22  Yes MARY Chao CNP   clopidogrel (PLAVIX) 75 MG tablet Take 1 tablet by mouth daily 1/24/22  Yes MARY Chao CNP   furosemide (LASIX) 20 MG tablet Take 1 tablet by mouth once daily 11/29/21  Yes MARY Chao CNP   betamethasone valerate (VALISONE) 0.1 % cream APPLY TOPICALLY TWO TIMES A DAY AS NEEDED 10/12/21  Yes Thea Abarca MD   albuterol (PROVENTIL) (5 MG/ML) 0.5% nebulizer solution 1 vial every 4 hours as needed for cough or wheezing 10/12/21  Yes Thea Abarca MD   nitroGLYCERIN (NITROSTAT) 0.4 MG SL tablet Place 1 tablet under the tongue every 5 minutes as needed for Chest pain 1/28/21  Yes MARY Chao CNP diclofenac sodium (VOLTAREN) 1 % GEL Apply 2 g topically 4 times daily 8/20/20  Yes Nusrat Zimmerman DO   aspirin 81 MG chewable tablet Take 1 tablet by mouth daily. 3/27/14  Yes MARY Maher CNP        Allergies:  Atenolol     Physical Examination:    Vitals:    04/25/22 1253   BP: 118/66   Pulse: 104   SpO2: 90%   Weight: 188 lb 8 oz (85.5 kg)   Height: 5' 2\" (1.575 m)     Constitutional and General Appearance: Warm and dry, no apparent distress, normal coloration  HEENT:  Normocephalic, atraumatic  Respiratory:  · Normal excursion and expansion without use of accessory muscles  · Resp Auscultation: Clear to auscultation   Cardiovascular:  · The apical impulses not displaced  · Heart tones are crisp and normal  · JVP 8 cm H2O  · Regular rate and rhythm, normal S1S2, no m/g/r  · Peripheral pulses are symmetrical and full  · There is no clubbing, cyanosis of the extremities.   · No BLE edema  · Pedal Pulses: 2+ and equal   Abdomen:  · No masses or tenderness  · Liver/Spleen: No Abnormalities Noted  Neurological/Psychiatric:  · Alert and oriented in all spheres  · Moves all extremities well  · Exhibits normal gait balance and coordination  · No abnormalities of mood, affect, memory, mentation, or behavior are noted    Lab Data:  Most recent lab results below reviewed in office    CBC:   Lab Results   Component Value Date    WBC 8.5 03/08/2021    WBC 9.5 12/05/2017    WBC 9.1 12/04/2017    RBC 5.46 03/08/2021    RBC 4.83 12/05/2017    RBC 5.41 12/04/2017    HGB 16.8 03/08/2021    HGB 14.7 12/05/2017    HGB 16.3 12/04/2017    HCT 50.9 03/08/2021    HCT 44.3 12/05/2017    HCT 49.4 12/04/2017    MCV 93.1 03/08/2021    MCV 91.8 12/05/2017    MCV 91.3 12/04/2017    RDW 15.2 03/08/2021    RDW 15.1 12/05/2017    RDW 15.1 12/04/2017     03/08/2021     12/05/2017     12/04/2017     BMP:  Lab Results   Component Value Date     10/12/2021     06/10/2021     03/08/2021    K 4.7 10/12/2021    K 4.7 06/10/2021    K 5.0 03/08/2021     10/12/2021     06/10/2021     03/08/2021    CO2 22 10/12/2021    CO2 28 06/10/2021    CO2 24 03/08/2021    BUN 11 10/12/2021    BUN 14 06/10/2021    BUN 13 03/08/2021    CREATININE 0.8 10/12/2021    CREATININE 0.9 06/10/2021    CREATININE 0.7 03/08/2021     BNP: No results found for: PROBNP  LIPID:   Lab Results   Component Value Date    TRIG 112 10/12/2021    TRIG 114 08/04/2020    HDL 44 10/12/2021    HDL 44 08/04/2020    HDL 42 01/21/2011    HDL 43 10/21/2010    LDLCALC 80 10/12/2021    LDLCALC 85 08/04/2020    LDLDIRECT 239 06/17/2010       Cardiac Imaging:  LEXISCAN NUCLEAR STRESS TEST 2/8/202:   Summary Normal LVEF >60% Normal wall motion  Borderline TID  Basal inferior scar vs attenuation artifact  No ischemia    Stress Protocols   Resting ECG  NSR, normal ekg   Resting HR:104 bpm  Resting BP:181/93 mmHg     Pre-stress physical exam: Pt denies CP. Pt  anxious. Pt c/o SOB that resolved without  intervention. Stress Protocol:Pharmacologic - Lexiscan's   Peak HR:130 bpm       HR/BP product:[id number]    Peak BP:183/101 mmHg  Predicted HR: 161 bpm  % of predicted HR: 81  Test duration: 1 min   ECG Findings  NSR  No ischemic EKG changes. Arrhythmias  PVCs in bigeminy     Symptoms  Pt denies CP. Complications  Procedure complication was none. Stress Interpretation  Normal LVEF >60% Normal wall motion  Borderline TID  Basal inferior scar vs attenuation artifact  No ischemia     ECHO 2/8/2022:    Summary   Left ventricular systolic function is normal with ejection fraction estimated at 55%. No regional wall motion abnormalities. Grade I diastolic dysfunction with normal left ventricular filling pressure. Mild mitral regurgitation. Mild tricuspid regurgitation. Systolic pulmonary artery pressure (SPAP) is estimated at 45 mmHg consistent with mild pulmonary hypertension (Right atrial pressure of 3 mmHg).           Stress Test 9/13/19   Summary  Technically difficult study due to significant bowel uptake  Within limitations of this study, normal LVEF and wall motion  No clear areas of ischemia      LEFT HEART CATH 12/4/17:   LM: luminals  LAD: mid section of disease with a very tight focal 90% in setting of surrounding 70% calcified lesion  LCX: 20% ostial              OM1- ostial 20%              OM2- patent mid stent  RCA: dominant, mid RCA     LVEDP:20  LVEF: 65%     PCI of mid LAD  Stent: Xience alpine 2.5 x 18mm post dilated to 2.64mm     Assessment  1. Successful PCI to mid LAD using xience drug stent              90%--> 0%  2. ASA 81mg qday for life, plavix 75mg po qday for 1 yr w/o inturruption  3. BB, statin  Limited Echo: 12/30/2014  Last echo on 3/8/14 showed EF 45-50% with inferior wall hypokinesis. Compared to previous echo, there has been some slight improvement in wall motion. Normal left ventricle size, wall thickness and systolic function with an  estimated ejection fraction of 55-60%. Mild inferolateral and apical  inferior hypokinesis. Diastolic filling parameters suggests normal diastolic  function. Stress Test: 2/27/14  Moderate sized focus of stress induced myocardial ischemia involving the inferior wall of the left ventricle at the mid heart. ECG correlation is recommended. 2. LVEF 62% + breast attentuation     CATH 3/7/2014  [facility] SUMMARY   ~LM normal   ~LAD Mid 80%   ~Cx Mid 70% focal   ~RCA Mid 100%, L->R collaterals   ~LVG 45% with inferior hk   Impression   ~Angiography w/ multivessel disease   ~LVEDP 18   ~LVG with depressed EF and inferior hypokinesis   Intervention   ~PCI of 100% RCA with [id number]   ~Staged PCI of Cx and LAD recommended     CATH 3/25/2014  1. Left main coronary artery was small, but had no angiographic evidence of   disease, bifurcating into the LAD and left circumflex. 2. The LAD had a 70% lesion noted within the mid LAD just after a septal   branch.  This was negative on the stress test with no evidence of ischemia   whatsoever and therefore left alone at this time. 3. The left circumflex was short. It had a large OM 1 branch that acted   more like a ramus that had very mild disease throughout its course. Just   after this, there was a very short focal segment of 70% to 75% stenosis in   the mid circumflex. This was the lesion that was intervened upon at this   time. 4. The right coronary artery was noted to be dominant. It had a patent   stent that was present in the mid RCA with no evidence of complication. ASSESSMENT: Successful percutaneous intervention to the mid left circumflex   using Xience Xpedition 2.25-mm x 8-mm. The 75% stenosis was reduced to 0%   with no complication and maintenance of ELIGIO 3 flow.            I appreciate the opportunity of cooperating in the care of this individual.    Corine Crawford, MARY - CNP, 4/25/2022, 1:10 PM

## 2022-05-03 ENCOUNTER — OFFICE VISIT (OUTPATIENT)
Dept: FAMILY MEDICINE CLINIC | Age: 62
End: 2022-05-03
Payer: COMMERCIAL

## 2022-05-03 VITALS
WEIGHT: 187.2 LBS | OXYGEN SATURATION: 92 % | HEART RATE: 77 BPM | DIASTOLIC BLOOD PRESSURE: 71 MMHG | TEMPERATURE: 97.6 F | BODY MASS INDEX: 34.24 KG/M2 | SYSTOLIC BLOOD PRESSURE: 108 MMHG

## 2022-05-03 DIAGNOSIS — J45.41 MODERATE PERSISTENT ASTHMA WITH ACUTE EXACERBATION: Primary | ICD-10-CM

## 2022-05-03 PROCEDURE — 99214 OFFICE O/P EST MOD 30 MIN: CPT | Performed by: NURSE PRACTITIONER

## 2022-05-03 RX ORDER — PREDNISONE 20 MG/1
20 TABLET ORAL DAILY
Qty: 7 TABLET | Refills: 0 | Status: SHIPPED | OUTPATIENT
Start: 2022-05-03 | End: 2022-05-10

## 2022-05-03 RX ORDER — GUAIFENESIN 600 MG/1
600 TABLET, EXTENDED RELEASE ORAL 2 TIMES DAILY
Qty: 30 TABLET | Refills: 0 | Status: SHIPPED | OUTPATIENT
Start: 2022-05-03 | End: 2022-05-18

## 2022-05-03 RX ORDER — ALBUTEROL SULFATE 90 UG/1
2 AEROSOL, METERED RESPIRATORY (INHALATION) EVERY 6 HOURS PRN
Qty: 18 G | Refills: 3 | Status: SHIPPED | OUTPATIENT
Start: 2022-05-03 | End: 2022-11-01

## 2022-05-03 RX ORDER — DOXYCYCLINE HYCLATE 100 MG
100 TABLET ORAL 2 TIMES DAILY
Qty: 14 TABLET | Refills: 0 | Status: SHIPPED | OUTPATIENT
Start: 2022-05-03 | End: 2022-05-10

## 2022-05-03 ASSESSMENT — PATIENT HEALTH QUESTIONNAIRE - PHQ9
8. MOVING OR SPEAKING SO SLOWLY THAT OTHER PEOPLE COULD HAVE NOTICED. OR THE OPPOSITE, BEING SO FIGETY OR RESTLESS THAT YOU HAVE BEEN MOVING AROUND A LOT MORE THAN USUAL: 0
5. POOR APPETITE OR OVEREATING: 0
2. FEELING DOWN, DEPRESSED OR HOPELESS: 0
9. THOUGHTS THAT YOU WOULD BE BETTER OFF DEAD, OR OF HURTING YOURSELF: 0
3. TROUBLE FALLING OR STAYING ASLEEP: 1
10. IF YOU CHECKED OFF ANY PROBLEMS, HOW DIFFICULT HAVE THESE PROBLEMS MADE IT FOR YOU TO DO YOUR WORK, TAKE CARE OF THINGS AT HOME, OR GET ALONG WITH OTHER PEOPLE: 0
SUM OF ALL RESPONSES TO PHQ QUESTIONS 1-9: 1
7. TROUBLE CONCENTRATING ON THINGS, SUCH AS READING THE NEWSPAPER OR WATCHING TELEVISION: 0
SUM OF ALL RESPONSES TO PHQ QUESTIONS 1-9: 1
6. FEELING BAD ABOUT YOURSELF - OR THAT YOU ARE A FAILURE OR HAVE LET YOURSELF OR YOUR FAMILY DOWN: 0
SUM OF ALL RESPONSES TO PHQ9 QUESTIONS 1 & 2: 0
1. LITTLE INTEREST OR PLEASURE IN DOING THINGS: 0
SUM OF ALL RESPONSES TO PHQ QUESTIONS 1-9: 1
SUM OF ALL RESPONSES TO PHQ QUESTIONS 1-9: 1
4. FEELING TIRED OR HAVING LITTLE ENERGY: 0

## 2022-05-03 ASSESSMENT — ENCOUNTER SYMPTOMS
CHEST TIGHTNESS: 0
ALLERGIC/IMMUNOLOGIC NEGATIVE: 1
WHEEZING: 1
EYES NEGATIVE: 1
COUGH: 1
SHORTNESS OF BREATH: 1
GASTROINTESTINAL NEGATIVE: 1

## 2022-05-03 NOTE — PATIENT INSTRUCTIONS
Please read the healthy family handout that you were given and share it with your family. Please compare this printed medication list with your medications at home to be sure they are the same. If you have any medications that are different please contact us immediately at [phone]. Also review your allergies that we have listed, these may also include medications that you have not been able to tolerate, make sure everything listed is correct. If you have any allergies that are different please contact us immediately at [phone]. Patient Education     Follow up if no better or worsening of symptoms. Stopping Smoking: Care Instructions  Your Care Instructions     Cigarette smokers crave the nicotine in cigarettes. Giving it up is much harder than simply changing a habit. Your body has to stop craving the nicotine. It is hard to quit, but you can do it. There are many tools that people use to quitsmoking. You may find that combining tools works best for you. There are several steps to quitting. First you get ready to quit. Then you get support to help you. After that, you learn new skills and behaviors to become anonsmoker. For many people, a necessary step is getting and using medicine. Your doctor will help you set up the plan that best meets your needs. You may want to attend a smoking cessation program to help you quit smoking. When you choose a program, look for one that has proven success. Ask your doctor for ideas. You will greatly increase your chances of success if you take medicineas well as get counseling or join a cessation program.  Some of the changes you feel when you first quit tobacco are uncomfortable. Your body will miss the nicotine at first, and you may feel short-tempered and grumpy. You may have trouble sleeping or concentrating. Medicine can help you deal with these symptoms. You may struggle with changing your smoking habits and rituals.  The last step is the tricky one: Be prepared for the smoking urge to continue for a time. This is a lot to deal with, but keep at it. You willfeel better. Follow-up care is a key part of your treatment and safety. Be sure to make and go to all appointments, and call your doctor if you are having problems. It's also a good idea to know your test results and keep alist of the medicines you take. How can you care for yourself at home?  Ask your family, friends, and coworkers for support. You have a better chance of quitting if you have help and support.  Join a support group, such as Nicotine Anonymous, for people who are trying to quit smoking.  Consider signing up for a smoking cessation program, such as the American Lung Association's Freedom from Smoking program.   Get text messaging support. Go to the website at www.smokefree. gov to sign up for the [facility] program.   Set a quit date. Pick your date carefully so that it is not right in the middle of a big deadline or stressful time. Once you quit, do not even take a puff. Get rid of all ashtrays and lighters after your last cigarette. Clean your house and your clothes so that they do not smell of smoke.  Learn how to be a nonsmoker. Think about ways you can avoid those things that make you reach for a cigarette. ? Avoid situations that put you at greatest risk for smoking. For some people, it is hard to have a drink with friends without smoking. For others, they might skip a coffee break with coworkers who smoke. ? Change your daily routine. Take a different route to work or eat a meal in a different place.  Cut down on stress. Calm yourself or release tension by doing an activity you enjoy, such as reading a book, taking a hot bath, or gardening.  Talk to your doctor or pharmacist about nicotine replacement therapy, which replaces the nicotine in your body.  You still get nicotine but you do not use tobacco. Nicotine replacement products help you slowly reduce the amount of nicotine you need. These products come in several forms, many of them available over-the-counter:  ? Nicotine patches  ? Nicotine gum and lozenges  ? Nicotine inhaler   Ask your doctor about bupropion (Wellbutrin) or varenicline (Chantix), which are prescription medicines. They do not contain nicotine. They help you by reducing withdrawal symptoms, such as stress and anxiety.  Some people find hypnosis, acupuncture, and massage helpful for ending the smoking habit.  Eat a healthy diet and get regular exercise. Having healthy habits will help your body move past its craving for nicotine.  Be prepared to keep trying. Most people are not successful the first few times they try to quit. Do not get mad at yourself if you smoke again. Make a list of things you learned and think about when you want to try again, such as next week, next month, or next year. Where can you learn more? Go to https://PicBadgespeAqueSys.A [facility] [employer] [facility]. org and sign in to your [employer] account. Enter D079 in the [employer] box to learn more about \"Stopping Smoking: Care Instructions. \"     If you do not have an account, please click on the \"Sign Up Now\" link. Current as of: October 28, 2021               Content Version: 13.2  © 2006-2022 [employer]. Care instructions adapted under license by [facility] ([facility]). If you have questions about a medical condition or this instruction, always ask your healthcare professional. [place] [address] any warranty or liability for your use of this information. Patient Education        COPD and Asthma: Care Instructions  Overview     Some people who have chronic obstructive pulmonary disease (COPD) may also have asthma. With both of these conditions, air doesn't flow easily in and out of your lungs. This can make it hard to breathe. You may be short of breath,wheeze, cough, or have mucus in your airways.   When you have COPD and asthma, it's important to follow your treatment plan. There are two parts to your treatment:   Controlling COPD and asthma over the long term.  Treating attacks or flare-ups when they occur. You and your doctor can make a plan that will help. This plan tells you the medicines you take to manage your symptoms and prevent attacks or flare-ups. Linda Kiss tells you what to do if you have an attack or flare-up. Follow-up care is a key part of your treatment and safety. Be sure to make and go to all appointments, and call your doctor if you are having problems. It's also a good idea to know your test results and keep alist of the medicines you take. How can you care for yourself at home? To control COPD and asthma   Do not smoke. Smoking can make COPD and asthma worse. If you need help quitting, talk to your doctor about stop-smoking programs and medicines. These can increase your chances of quitting for good.  Learn what sets off your COPD and asthma. Avoid these triggers when you can. Common triggers include smoke, pollen, pollution, and infections like colds, the flu, or pneumonia.  Check yourself for symptoms to know which step to follow in your action plan. Watch for things like being short of breath, having chest tightness, and coughing more than usual. Look for a change in the color or thickness of your mucus. Also notice if symptoms wake you up at night or if you get tired quickly when you exercise.  Check your lungs with a peak flow meter, if your doctor recommends it. Peak flow can tell you how well your lungs are working.  Try pulmonary rehabilitation, if your doctor prescribes it. This combines different treatments to help you reduce your symptoms and stay as active and healthy as you can. Medicines    Call your doctor if you think you are having a problem with your medicine. COPD and asthma are treated with medicine to help you breathe easier. You may take:  ? Controller medicines.  These prevent attacks and flare-ups before they happen. They are taken regularly. There are different types. ? Quick-relief medicine. This is for times when you can't prevent symptoms and need to treat them fast. It can quickly relax the airways and allow you to breathe easier.  Learn how to use your inhalers the right way. Ask your doctor or pharmacist for help.  Use oxygen if your doctor recommends it. Oxygen therapy boosts the amount of oxygen in your blood and helps you breathe easier. Use the flow rate your doctor recommends. Do not change it without talking to your doctor first.  Preventing infections   [facility] your hands often.  Avoid contact with people who have colds and other respiratory infections.  Get a flu vaccine every year. Ask your doctor about getting the pneumococcal and whooping cough (pertussis) shots. To treat attacks when they occur   Follow your action plan. It can tell you what to do when you have an attack or flare-up.  Take your quick-relief medicine exactly as prescribed. Talk with your doctor if you have any problems with your medicine. ? Keep this medicine with you at all times. ? You may need to use this medicine before you exercise to prevent an attack.  If your doctor prescribed corticosteroid pills or other medicines to use during an attack or flare-up, take them as directed. They may shorten the attack and help you breathe easier. When should you call for help? Call 911 anytime you think you may need emergency care. For example, call if:     You have severe trouble breathing.      You are having chest pain that is different or worse than usual.   Call your doctor now or seek immediate medical care if:     You have new or worse shortness of breath.      You cough up blood.      You have a fever.      You have used your quick-relief medicine but you are still short of breath.      You have feelings of anxiety or depression.    Watch closely for changes in your health, and be sure to contact your doctor if:     You are coughing more deeply or more often, or you notice more mucus or a change in the color of your mucus.      You have new or worse swelling in your legs or belly.      You are not getting better as expected. Where can you learn more? Go to https://chboriseb.[employer].com. org and sign in to your [employer] account. Enter A350 in the [employer] box to learn more about \"COPD and Asthma: Care Instructions. \"     If you do not have an account, please click on the \"Sign Up Now\" link. Current as of: July 6, 2021               Content Version: 13.2  © 4755-1617 [employer]. Care instructions adapted under license by [facility] [place] ([facility]). If you have questions about a medical condition or this instruction, always ask your healthcare professional. [address] any warranty or liability for your use of this information.

## 2022-05-03 NOTE — LETTER
[address] [id number]  Phone: [phone]  Fax: [phone]    Parvez Brandon CNP        May 3, 2022     Patient: Melanie Garcia   YOB: 1960   Date of Visit: 5/3/2022       To Whom it May Concern: Aminata Fonseca was seen in my clinic on 5/3/2022. She may return to work on 05/05/2022. If you have any questions or concerns, please don't hesitate to call.     Sincerely,         Miriam Agosto, MARY - CNP

## 2022-05-03 NOTE — PROGRESS NOTES
Subjective:      Patient ID: Claudy Zuniga is a 63 y.o. female. Chief Complaint   Patient presents with    Cough     drainage    Asthma        HPI  Patient presents today with c/o increased cough, mild sob, and wheezing over the past 2 weeks. Asthma  Patient presents for evaluation of chest tightness, dyspnea, productive cough with sputum described as thick yellow and wheezing. The patient has been previously diagnosed with asthma. Symptoms currently include as previously noted and occur intermittently . Observed precipitants include: strong odors. Current limitations in activity from asthma: yes. Number of days of school or work missed in the last month: 0. Does she do nebulizer treatments? Has them ordered however has ordered and also send refills   Does she use an inhaler? Yes Trilegy    Review of Systems   Constitutional: Negative. Negative for activity change, appetite change, chills, fever and unexpected weight change. HENT: Negative. Negative for sneezing. Eyes: Negative. Respiratory: Positive for cough, shortness of breath and wheezing. Negative for chest tightness. Cardiovascular: Negative. Negative for chest pain, palpitations and leg swelling. Gastrointestinal: Negative. Endocrine: Negative. Genitourinary: Negative. Skin: Negative. Allergic/Immunologic: Negative. Neurological: Negative for dizziness and headaches. Psychiatric/Behavioral: Negative.         Patient Active Problem List   Diagnosis    Mixed hyperlipidemia    Hyperglycemia    Major depressive disorder, single episode, mild (HCC)    Mild persistent asthma without complication    Dermatitis    Abnormal stress test    NSTEMI (non-ST elevated myocardial infarction) ([address]    Essential hypertension    Coronary artery disease involving native coronary artery of native heart with unstable angina pectoris ([address]    Hypercholesteremia    H/O heart artery stent    Noncompliance    S/P PTCA (percutaneous transluminal coronary angioplasty)    Unstable angina (HCC)    Situational anxiety    Tobacco abuse    RYLIE (obstructive sleep apnea)    Panlobular emphysema (HCC)    Muscle strain of left shoulder region    Hypoxia       No outpatient medications have been marked as taking for the 5/3/22 encounter (Appointment) with MARY Donohue CNP. Allergies   Allergen Reactions    Atenolol Other (See Comments)     Disoriented        Social History     Tobacco Use    Smoking status: Current Every Day Smoker     Packs/day: 0.50     Years: 32.00     Pack years: 16.00     Types: Cigarettes    Smokeless tobacco: Never Used    Tobacco comment: 1/2 pack, per smoking hx audit in 2010 pt smoked 20 yrs, at heaviest 1 ppd, average 0.75   Substance Use Topics    Alcohol use: No       Objective:   /71   Pulse 77   Temp 97.6 °F (36.4 °C) (Oral)   Wt 187 lb 3.2 oz (84.9 kg)   SpO2 92%   BMI 34.24 kg/m²     Physical Exam  Vitals and nursing note reviewed. Constitutional:       General: She is not in acute distress. Appearance: Normal appearance. She is well-developed and well-groomed. HENT:      Head: Normocephalic and atraumatic. Eyes:      Extraocular Movements: Extraocular movements intact. Conjunctiva/sclera: Conjunctivae normal.   Cardiovascular:      Rate and Rhythm: Normal rate and regular rhythm. Pulses: Normal pulses. Heart sounds: Normal heart sounds. Pulmonary:      Effort: Pulmonary effort is normal. No accessory muscle usage or respiratory distress. Breath sounds: Decreased breath sounds (slightly diminshed breath sounds in the bilat lower lobs (right > left)), wheezing (scattered expiratory wheezes), rhonchi (scattered coarse rhonchi that does improve with cough) and rales present. Abdominal:      General: Bowel sounds are normal.      Palpations: Abdomen is soft. Musculoskeletal:      Cervical back: Normal range of motion and neck supple. Right lower leg: No edema. Left lower leg: No edema. Skin:     General: Skin is warm and dry. Findings: No rash. Neurological:      General: No focal deficit present. Mental Status: She is alert and oriented to person, place, and time. Mental status is at baseline. Psychiatric:         Attention and Perception: Attention normal.         Mood and Affect: Mood normal.         Speech: Speech normal.         Behavior: Behavior normal. Behavior is cooperative. Assessment/Plan:   1. Moderate persistent asthma with acute exacerbation  Patient presents today with a two week history of productive cough of yellow phlegm, increased wheezing and sob, . Patient denies fever or chills. Patient reports she is taking Trilegy as directed however ran out of albuterol MDI and has not been using HHN (albuterol). Given duration of symptoms, history of asthma and smoking recommend treatment as below. Advised to drink plenty of fluids. Advised patient to take medication as prescribed and to call with any problems or concerns. Patient verbalized understanding agreeable plan. - albuterol sulfate HFA (PROVENTIL HFA) 108 (90 Base) MCG/ACT inhaler; Inhale 2 puffs into the lungs every 6 hours as needed for Wheezing or Shortness of Breath  Dispense: 18 g; Refill: 3  - predniSONE (DELTASONE) 20 MG tablet; Take 1 tablet by mouth daily for 7 days  Dispense: 7 tablet; Refill: 0  - guaiFENesin (MUCINEX) 600 MG extended release tablet; Take 1 tablet by mouth 2 times daily for 15 days  Dispense: 30 tablet; Refill: 0  - doxycycline hyclate (VIBRA-TABS) 100 MG tablet; Take 1 tablet by mouth 2 times daily for 7 days  Dispense: 14 tablet; Refill: 0  - albuterol (PROVENTIL) (5 MG/ML) 0.5% nebulizer solution; 1 vial every 4 hours as needed for cough or wheezing  Dispense: 120 each;  Refill: 5

## 2022-05-10 RX ORDER — ALBUTEROL SULFATE 2.5 MG/3ML
2.5 SOLUTION RESPIRATORY (INHALATION) EVERY 6 HOURS PRN
Qty: 120 EACH | Refills: 3 | Status: SHIPPED | OUTPATIENT
Start: 2022-05-10

## 2022-05-10 NOTE — TELEPHONE ENCOUNTER
Pharmacy said the albuterol solution that was ordered for patient which was 0.5% is on manufacture backorder. They wanted to see if you could send new script for different strength?    (Romina Jon)

## 2022-05-17 RX ORDER — FLUTICASONE FUROATE, UMECLIDINIUM BROMIDE AND VILANTEROL TRIFENATATE 100; 62.5; 25 UG/1; UG/1; UG/1
POWDER RESPIRATORY (INHALATION)
Qty: 60 EACH | Refills: 5 | Status: SHIPPED | OUTPATIENT
Start: 2022-05-17

## 2022-05-17 RX ORDER — FUROSEMIDE 20 MG/1
TABLET ORAL
Qty: 90 TABLET | Refills: 3 | Status: SHIPPED | OUTPATIENT
Start: 2022-05-17

## 2022-08-05 ENCOUNTER — OFFICE VISIT (OUTPATIENT)
Dept: FAMILY MEDICINE CLINIC | Age: 62
End: 2022-08-05
Payer: COMMERCIAL

## 2022-08-05 VITALS
SYSTOLIC BLOOD PRESSURE: 137 MMHG | BODY MASS INDEX: 34.75 KG/M2 | OXYGEN SATURATION: 85 % | WEIGHT: 190 LBS | HEART RATE: 87 BPM | DIASTOLIC BLOOD PRESSURE: 81 MMHG

## 2022-08-05 DIAGNOSIS — R73.9 HYPERGLYCEMIA: ICD-10-CM

## 2022-08-05 DIAGNOSIS — R19.09 ABDOMINAL MASS OF OTHER SITE: ICD-10-CM

## 2022-08-05 DIAGNOSIS — I25.110 CORONARY ARTERY DISEASE INVOLVING NATIVE CORONARY ARTERY OF NATIVE HEART WITH UNSTABLE ANGINA PECTORIS (HCC): ICD-10-CM

## 2022-08-05 DIAGNOSIS — J43.1 PANLOBULAR EMPHYSEMA (HCC): ICD-10-CM

## 2022-08-05 DIAGNOSIS — Z12.11 COLON CANCER SCREENING: ICD-10-CM

## 2022-08-05 DIAGNOSIS — J96.11 CHRONIC RESPIRATORY FAILURE WITH HYPOXIA (HCC): ICD-10-CM

## 2022-08-05 DIAGNOSIS — R10.9 FLANK PAIN: Primary | ICD-10-CM

## 2022-08-05 LAB
ALT SERPL-CCNC: 18 U/L (ref 10–40)
ANION GAP SERPL CALCULATED.3IONS-SCNC: 12 MMOL/L (ref 3–16)
BILIRUBIN, POC: NORMAL
BLOOD URINE, POC: NORMAL
BUN BLDV-MCNC: 15 MG/DL (ref 7–20)
CALCIUM SERPL-MCNC: 9.2 MG/DL (ref 8.3–10.6)
CHLORIDE BLD-SCNC: 104 MMOL/L (ref 99–110)
CHOLESTEROL, TOTAL: 152 MG/DL (ref 0–199)
CLARITY, POC: NORMAL
CO2: 28 MMOL/L (ref 21–32)
COLOR, POC: YELLOW
CREAT SERPL-MCNC: 0.9 MG/DL (ref 0.6–1.1)
GFR AFRICAN AMERICAN: >60
GFR NON-AFRICAN AMERICAN: >60
GLUCOSE BLD-MCNC: 97 MG/DL (ref 70–99)
GLUCOSE URINE, POC: NORMAL
HDLC SERPL-MCNC: 50 MG/DL (ref 40–60)
KETONES, POC: NORMAL
LDL CHOLESTEROL CALCULATED: 76 MG/DL
LEUKOCYTE EST, POC: NORMAL
NITRITE, POC: NORMAL
PH, POC: 7
POTASSIUM SERPL-SCNC: 4.5 MMOL/L (ref 3.5–5.1)
PROTEIN, POC: NORMAL
SODIUM BLD-SCNC: 144 MMOL/L (ref 136–145)
SPECIFIC GRAVITY, POC: 1.02
TRIGL SERPL-MCNC: 130 MG/DL (ref 0–150)
UROBILINOGEN, POC: 0.2
VLDLC SERPL CALC-MCNC: 26 MG/DL

## 2022-08-05 PROCEDURE — 99214 OFFICE O/P EST MOD 30 MIN: CPT | Performed by: FAMILY MEDICINE

## 2022-08-05 PROCEDURE — 81002 URINALYSIS NONAUTO W/O SCOPE: CPT | Performed by: FAMILY MEDICINE

## 2022-08-05 RX ORDER — PHENAZOPYRIDINE HYDROCHLORIDE 200 MG/1
200 TABLET, FILM COATED ORAL 3 TIMES DAILY PRN
Qty: 60 TABLET | Refills: 1 | Status: SHIPPED | OUTPATIENT
Start: 2022-08-05

## 2022-08-05 NOTE — PATIENT INSTRUCTIONS
Please read the healthy family handout that you were given and share it with your family. Please compare this printed medication list with your medications at home to be sure they are the same. If you have any medications that are different please contact us immediately at [phone]. Also review your allergies that we have listed, these may also include medications that you have not been able to tolerate, make sure everything listed is correct. If you have any allergies that are different please contact us immediately at [phone]. You may receive a survey in the mail or by email asking about your experience during your visit today. Please complete and return to us so we know how we are serving you.

## 2022-08-05 NOTE — PROGRESS NOTES
Subjective: Toni Tavarez is here to discuss the following issues. She has some mild intermittent left flank pain and history of UTI and requests urinalysis to ensure that infection has not recurred. She has coronary artery disease with no chest pain or pressure. No edema no orthopnea. She has a long history of emphysema and hypoxia and continues to smoke. She has declined using oxygen at home. In part this is due to expense. She has not had recent colon cancer screening and is agreeable to a fit test only. She has hyperglycemia with no polydipsia or polyuria. She has a mass in the left upper and lower abdominal area suspected to be an atypical hernia that is marginally reducible and sometimes tender. No fever sweats or chills bowel or bladder symptoms  Social History     Tobacco Use   Smoking Status Every Day    Packs/day: 0.50    Years: 32.00    Pack years: 16.00    Types: Cigarettes   Smokeless Tobacco Never   Tobacco Comments    1/2 pack, per smoking hx audit in 2010 pt smoked 20 yrs, at heaviest 1 ppd, average 0.75   Allergies:     Atenolol    Objective:  /81   Pulse 87   Wt 190 lb (86.2 kg)   SpO2 (!) 85%   BMI 34.75 kg/m²    No acute distress, heart regular rate and rhythm without murmur, lungs clear to auscultation easy effort, abdomen soft nondistended, no clubbing or cyanosis mass noted as described above    Assessment:  1. Flank pain    2. Coronary artery disease involving native coronary artery of native heart with unstable angina pectoris ([address]    3. Panlobular emphysema ([address]    4. Chronic respiratory failure with hypoxia (HCC)    5. Colon cancer screening    6. Hyperglycemia    7.  Abdominal mass of other site            Plan:  Labs ordered  Abdominal and pelvic CT  Urinalysis with results noted  Encouraged to start oxygen as previously prescribed but she is hesitant due to expense  She has a history of sleep apnea but has been hesitant to treatment due to expense  Encouraged to

## 2022-08-06 LAB
ESTIMATED AVERAGE GLUCOSE: 165.7 MG/DL
HBA1C MFR BLD: 7.4 %

## 2022-08-08 ENCOUNTER — OFFICE VISIT (OUTPATIENT)
Dept: CARDIOLOGY CLINIC | Age: 62
End: 2022-08-08
Payer: COMMERCIAL

## 2022-08-08 VITALS
HEART RATE: 111 BPM | OXYGEN SATURATION: 92 % | SYSTOLIC BLOOD PRESSURE: 110 MMHG | BODY MASS INDEX: 35.24 KG/M2 | DIASTOLIC BLOOD PRESSURE: 78 MMHG | WEIGHT: 191.5 LBS | HEIGHT: 62 IN

## 2022-08-08 DIAGNOSIS — I10 ESSENTIAL HYPERTENSION: ICD-10-CM

## 2022-08-08 DIAGNOSIS — G47.33 OSA (OBSTRUCTIVE SLEEP APNEA): ICD-10-CM

## 2022-08-08 DIAGNOSIS — E78.2 MIXED HYPERLIPIDEMIA: ICD-10-CM

## 2022-08-08 DIAGNOSIS — R00.2 PALPITATION: ICD-10-CM

## 2022-08-08 DIAGNOSIS — Z72.0 TOBACCO ABUSE: ICD-10-CM

## 2022-08-08 DIAGNOSIS — I25.110 CORONARY ARTERY DISEASE INVOLVING NATIVE CORONARY ARTERY OF NATIVE HEART WITH UNSTABLE ANGINA PECTORIS (HCC): Primary | ICD-10-CM

## 2022-08-08 PROCEDURE — 99214 OFFICE O/P EST MOD 30 MIN: CPT | Performed by: NURSE PRACTITIONER

## 2022-08-08 RX ORDER — LISINOPRIL 5 MG/1
TABLET ORAL
Qty: 90 TABLET | Refills: 3 | Status: SHIPPED | OUTPATIENT
Start: 2022-08-08

## 2022-08-08 NOTE — PATIENT INSTRUCTIONS
No need for any further heart testing  No change in current heart medicines  Follow up with me in 6 months        Latest Reference Range & Units 8/4/20 11:19 10/12/21 09:12 8/5/22 10:33   CHOLESTEROL, TOTAL, [id number] 0 - 199 mg/dL 152 146 152   HDL Cholesterol >40 mg/dL 44 44 50   LDL Calculated <70 mg/dL 85 80 76   Triglycerides 0 - 150 mg/dL 114 112 130

## 2022-08-08 NOTE — PROGRESS NOTES
[address]   Cardiac Evaluation    Primary Care Doctor: Pattie Stahl MD    Chief Complaint   Patient presents with    Follow-up    Coronary Artery Disease    Shortness of Breath        Assessment:    1. Coronary artery disease involving native coronary artery of native heart with unstable angina pectoris ([address]    2. Essential hypertension    3. Mixed hyperlipidemia    4. Palpitation    5. Tobacco abuse    6. RYLIE (obstructive sleep apnea)        Plan:   No need for any further heart testing  No change in current heart medicines  Follow up with me in 6 months     Vitals:    08/08/22 1300 08/08/22 1315   BP: 110/78    Pulse: (!) 108 (!) 111   SpO2: (!) 87% 92%   Weight: 191 lb 8 oz (86.9 kg)    Height: 5' 2\" (1.575 m)        Primary Cardiologist: Dr. Rozina Boo     History of Present Illness:   I had the pleasure of seeing Kirsten Joshua (73 y.o.) in follow up for CAD s/p PCI to RCA (2014, STEMI), LCx (2014) and LAD (2017), HTN, HLD as well as RYLIE, emphysema and smoker. She reported palpitations so was restarted on Toprol XL 25 mg daily. She had previously been prescribed this but lost the prescription. She is just doing fair. O2 continues to run low. Not able to afford oxygen. Her breathing is worse with exertion and exposure to perfumes, etc. Also notes wheezing more, worse with heat. + cough, productive of milky substance. She has an occasional palpitation but only once in a great while. No chest pain. Tolerating the Toprol but does make her tired. Appetite is okay. The grandsons are doing okay but still small. She is going to have CT abdomen to evaluate hernia. Also having low back pain and urinary issues. Kirsten Joshua describes symptoms including dyspnea, palpitations, fatigue but denies chest pain, orthopnea, PND, early saiety, edema, syncope.      NYHA:   II  ACC/ AHA Stage:    C    Past Medical History:   has a past medical history of Asthma, CAD (coronary artery disease), Depression, HTN, Hyperglycemia, and Hyperlipidemia. Surgical History:   has a past surgical history that includes Breast biopsy; Coronary angioplasty with stent (03/07/2014); Coronary angioplasty with stent (03/26/2014); and Coronary angioplasty with stent (12/04/2017). Social History:   reports that she has been smoking cigarettes. She has a 16.00 pack-year smoking history. She has never used smokeless tobacco. She reports that she does not drink alcohol and does not use drugs. Family History:   Family History   Problem Relation Age of Onset    Heart Attack Mother     Heart Disease Mother     Diabetes Mother     Heart Failure Father     Heart Disease Father        Home Medications:  Prior to Admission medications    Medication Sig Start Date End Date Taking?  Authorizing Provider   phenazopyridine (PYRIDIUM) 200 MG tablet Take 1 tablet by mouth 3 times daily as needed for Pain 8/5/22  Yes Alok Chester MD   Vara Slate 970-91.2-82 MCG/INH AEPB INHALE 1 PUFF ONCE DAILY 5/17/22  Yes Alok Chester MD   furosemide (LASIX) 20 MG tablet Take 1 tablet by mouth once daily 5/17/22  Yes MARY Myles - CNP   albuterol (PROVENTIL) (2.5 MG/3ML) 0.083% nebulizer solution Take 3 mLs by nebulization every 6 hours as needed for Wheezing or Shortness of Breath 5/10/22  Yes Alok Chester MD   albuterol sulfate HFA (PROVENTIL HFA) 108 (90 Base) MCG/ACT inhaler Inhale 2 puffs into the lungs every 6 hours as needed for Wheezing or Shortness of Breath 5/3/22  Yes MARY Hart CNP   metoprolol succinate (TOPROL XL) 25 MG extended release tablet Take 1 tablet by mouth daily 4/25/22  Yes Tracy LipsMARY CNP   lisinopril (PRINIVIL;ZESTRIL) 5 MG tablet Take 1 tablet by mouth once daily 2/21/22  Yes Tracy LipsMARY - CNP   rosuvastatin (CRESTOR) 20 MG tablet Take 1 tablet by mouth daily 1/24/22  Yes Tracy Mccollum APRN - CNP   clopidogrel (PLAVIX) 75 MG tablet Take 1 tablet by mouth daily 1/24/22  Yes MARY Martinez CNP   betamethasone valerate (VALISONE) 0.1 % cream APPLY TOPICALLY TWO TIMES A DAY AS NEEDED 10/12/21  Yes Adria Sims MD   nitroGLYCERIN (NITROSTAT) 0.4 MG SL tablet Place 1 tablet under the tongue every 5 minutes as needed for Chest pain 1/28/21  Yes MARY Martinez CNP   diclofenac sodium (VOLTAREN) 1 % GEL Apply 2 g topically 4 times daily 8/20/20  Yes Lindsay Zimmerman DO   aspirin 81 MG chewable tablet Take 1 tablet by mouth daily. 3/27/14  Yes MARY Cuevas CNP   albuterol (PROVENTIL) (5 MG/ML) 0.5% nebulizer solution 1 vial every 4 hours as needed for cough or wheezing 5/3/22   MARY Monterroso CNP        Allergies:  Atenolol     Physical Examination:    Vitals:    08/08/22 1300 08/08/22 1315   BP: 110/78    Pulse: (!) 108 (!) 111   SpO2: (!) 87% 92%   Weight: 191 lb 8 oz (86.9 kg)    Height: 5' 2\" (1.575 m)      Constitutional and General Appearance: Warm and dry, no apparent distress, normal coloration  HEENT:  Normocephalic, atraumatic  Respiratory:  Normal excursion and expansion without use of accessory muscles  Resp Auscultation: inspiratory wheeze on left, otherwise Clear to auscultation   Cardiovascular: The apical impulses not displaced  Heart tones are crisp and normal  JVP 8 cm H2O  Regular rate and rhythm, normal S1S2, tachy, no m/g/r  Peripheral pulses are symmetrical and full  There is no clubbing, cyanosis of the extremities.   No BLE edema  Pedal Pulses: 2+ and equal   Abdomen:  No masses or tenderness  Liver/Spleen: No Abnormalities Noted  Neurological/Psychiatric:  Alert and oriented in all spheres  Moves all extremities well  Exhibits normal gait balance and coordination  No abnormalities of mood, affect, memory, mentation, or behavior are noted    Lab Data:  Most recent lab results below reviewed in office    CBC:   Lab Results   Component Value Date/Time    WBC 8.5 03/08/2021 04:03 PM    WBC 9.5 12/05/2017 05:38 AM    WBC 9.1 12/04/2017 09:50 AM    RBC 5.46 03/08/2021 04:03 PM    RBC 4.83 12/05/2017 05:38 AM    RBC 5.41 12/04/2017 09:50 AM    HGB 16.8 03/08/2021 04:03 PM    HGB 14.7 12/05/2017 05:38 AM    HGB 16.3 12/04/2017 09:50 AM    HCT 50.9 03/08/2021 04:03 PM    HCT 44.3 12/05/2017 05:38 AM    HCT 49.4 12/04/2017 09:50 AM    MCV 93.1 03/08/2021 04:03 PM    MCV 91.8 12/05/2017 05:38 AM    MCV 91.3 12/04/2017 09:50 AM    RDW 15.2 03/08/2021 04:03 PM    RDW 15.1 12/05/2017 05:38 AM    RDW 15.1 12/04/2017 09:50 AM     03/08/2021 04:03 PM     12/05/2017 05:38 AM     12/04/2017 09:50 AM     BMP:  Lab Results   Component Value Date/Time     08/05/2022 10:33 AM     10/12/2021 09:12 AM     06/10/2021 11:44 AM    K 4.5 08/05/2022 10:33 AM    K 4.7 10/12/2021 09:12 AM    K 4.7 06/10/2021 11:44 AM     08/05/2022 10:33 AM     10/12/2021 09:12 AM     06/10/2021 11:44 AM    CO2 28 08/05/2022 10:33 AM    CO2 22 10/12/2021 09:12 AM    CO2 28 06/10/2021 11:44 AM    BUN 15 08/05/2022 10:33 AM    BUN 11 10/12/2021 09:12 AM    BUN 14 06/10/2021 11:44 AM    CREATININE 0.9 08/05/2022 10:33 AM    CREATININE 0.8 10/12/2021 09:12 AM    CREATININE 0.9 06/10/2021 11:44 AM     BNP: No results found for: PROBNP  LIPID:   Lab Results   Component Value Date/Time    TRIG 130 08/05/2022 10:33 AM    TRIG 112 10/12/2021 09:12 AM    HDL 50 08/05/2022 10:33 AM    HDL 44 10/12/2021 09:12 AM    HDL 42 01/21/2011 09:08 AM    HDL 43 10/21/2010 10:48 AM    LDLCALC 76 08/05/2022 10:33 AM    LDLCALC 80 10/12/2021 09:12 AM    LDLDIRECT 239 06/17/2010 08:50 AM       Cardiac Imaging:  LEXISCAN NUCLEAR STRESS TEST 2/8/2022:   Summary Normal LVEF >60% Normal wall motion  Borderline TID  Basal inferior scar vs attenuation artifact  No ischemia  Stress Protocols   Resting ECG  NSR, normal ekg   Resting HR:104 bpm  Resting BP:181/93 mmHg   Pre-stress physical exam: Pt denies CP. Pt  anxious.  Pt c/o SOB that resolved without  intervention. Stress Protocol:Pharmacologic - Lexiscan's   Peak HR:130 bpm       HR/BP product:69027  Peak BP:183/101 mmHg  Predicted HR: 161 bpm  % of predicted HR: 81  Test duration: 1 min   ECG Findings  NSR  No ischemic EKG changes. Arrhythmias  PVCs in bigeminy   Symptoms  Pt denies CP. Complications  Procedure complication was none. Stress Interpretation  Normal LVEF >60% Normal wall motion  Borderline TID  Basal inferior scar vs attenuation artifact  No ischemia     ECHO 2/8/2022:    Summary   Left ventricular systolic function is normal with ejection fraction estimated at 55%. No regional wall motion abnormalities. Grade I diastolic dysfunction with normal left ventricular filling pressure. Mild mitral regurgitation. Mild tricuspid regurgitation. Systolic pulmonary artery pressure (SPAP) is estimated at 45 mmHg consistent with mild pulmonary hypertension (Right atrial pressure of 3 mmHg). Stress Test 9/13/19   Summary  Technically difficult study due to significant bowel uptake  Within limitations of this study, normal LVEF and wall motion  No clear areas of ischemia        LEFT HEART CATH 12/4/17:   LM: luminals  LAD: mid section of disease with a very tight focal 90% in setting of surrounding 70% calcified lesion  LCX: 20% ostial              OM1- ostial 20%              OM2- patent mid stent  RCA: dominant, mid RCA     LVEDP:20  LVEF: 65%     PCI of mid LAD  Stent: Xience alpine 2.5 x 18mm post dilated to 2.64mm     Assessment  1. Successful PCI to mid LAD using xience drug stent              90%--> 0%  2. ASA 81mg qday for life, plavix 75mg po qday for 1 yr w/o inturruption  3. BB, statin    Limited Echo: 12/30/2014  Last echo on 3/8/14 showed EF 45-50% with inferior wall hypokinesis. Compared to previous echo, there has been some slight improvement in wall motion.   Normal left ventricle size, wall thickness and systolic function with an  estimated ejection fraction of 55-60%. Mild inferolateral and apical inferior hypokinesis. Diastolic filling parameters suggests normal diastolic function. Stress Test: 2/27/14  Moderate sized focus of stress induced myocardial ischemia involving the inferior wall of the left ventricle at the mid heart. ECG correlation is recommended. 2. LVEF 62% + breast attentuation     CATH 3/7/2014  [facility] SUMMARY   ~LM normal   ~LAD Mid 80%   ~Cx Mid 70% focal   ~RCA Mid 100%, L->R collaterals   ~LVG 45% with inferior hk   Impression   ~Angiography w/ multivessel disease   ~LVEDP 18   ~LVG with depressed EF and inferior hypokinesis   Intervention   ~PCI of 100% RCA with [id number]   ~Staged PCI of Cx and LAD recommended     CATH 3/25/2014  1. Left main coronary artery was small, but had no angiographic evidence of   disease, bifurcating into the LAD and left circumflex. 2. The LAD had a 70% lesion noted within the mid LAD just after a septal   branch. This was negative on the stress test with no evidence of ischemia   whatsoever and therefore left alone at this time. 3. The left circumflex was short. It had a large OM 1 branch that acted   more like a ramus that had very mild disease throughout its course. Just   after this, there was a very short focal segment of 70% to 75% stenosis in   the mid circumflex. This was the lesion that was intervened upon at this   time. 4. The right coronary artery was noted to be dominant. It had a patent   stent that was present in the mid RCA with no evidence of complication. ASSESSMENT: Successful percutaneous intervention to the mid left circumflex   using Xience Xpedition 2.25-mm x 8-mm. The 75% stenosis was reduced to 0%   with no complication and maintenance of ELIGIO 3 flow.          I appreciate the opportunity of cooperating in the care of this individual.    Jovany Montano, MARY - CNP, 8/8/2022, 1:15 PM

## 2022-08-15 ENCOUNTER — HOSPITAL ENCOUNTER (OUTPATIENT)
Dept: CT IMAGING | Age: 62
Discharge: HOME | End: 2022-08-15
Payer: COMMERCIAL

## 2022-08-15 DIAGNOSIS — R19.09 ABDOMINAL MASS OF OTHER SITE: ICD-10-CM

## 2022-08-15 PROCEDURE — 6360000004 HC RX CONTRAST MEDICATION: Performed by: FAMILY MEDICINE

## 2022-08-15 PROCEDURE — 74177 CT ABD & PELVIS W/CONTRAST: CPT

## 2022-08-15 RX ADMIN — IOPAMIDOL 75 ML: 755 INJECTION, SOLUTION INTRAVENOUS at 07:03

## 2022-08-15 NOTE — RESULT ENCOUNTER NOTE
Result noted. Please advise [facility] [place] that there is no evidence of a hernia on the cat scan   They do see something on both adrenal glands which most of the time is a cyst that does not require treatment but radiology suggests an mri to be sure. Please order additional testing as suggested by radiology. Please call radiology to clarify exactly what type of MRI to order.

## 2022-08-18 DIAGNOSIS — E27.8 ADRENAL GLAND CYST (HCC): Primary | ICD-10-CM

## 2022-08-20 DIAGNOSIS — I49.3 PREMATURE VENTRICULAR CONTRACTIONS: ICD-10-CM

## 2022-08-22 RX ORDER — METOPROLOL SUCCINATE 25 MG/1
TABLET, EXTENDED RELEASE ORAL
Qty: 90 TABLET | Refills: 3 | Status: SHIPPED | OUTPATIENT
Start: 2022-08-22

## 2022-09-07 ENCOUNTER — HOSPITAL ENCOUNTER (OUTPATIENT)
Age: 62
Discharge: HOME | End: 2022-09-07
Payer: COMMERCIAL

## 2022-09-07 ENCOUNTER — HOSPITAL ENCOUNTER (OUTPATIENT)
Dept: MRI IMAGING | Age: 62
Discharge: HOME | End: 2022-09-07
Payer: COMMERCIAL

## 2022-09-07 DIAGNOSIS — E27.8 ADRENAL GLAND CYST (HCC): ICD-10-CM

## 2022-09-07 LAB
BUN BLDV-MCNC: 13 MG/DL (ref 7–20)
CREAT SERPL-MCNC: 0.8 MG/DL (ref 0.6–1.1)
GFR AFRICAN AMERICAN: >60
GFR NON-AFRICAN AMERICAN: >60

## 2022-09-07 PROCEDURE — 84520 ASSAY OF UREA NITROGEN: CPT

## 2022-09-07 PROCEDURE — 82565 ASSAY OF CREATININE: CPT

## 2022-09-07 PROCEDURE — 6360000004 HC RX CONTRAST MEDICATION: Performed by: FAMILY MEDICINE

## 2022-09-07 PROCEDURE — A9579 GAD-BASE MR CONTRAST NOS,1ML: HCPCS | Performed by: FAMILY MEDICINE

## 2022-09-07 PROCEDURE — 36415 COLL VENOUS BLD VENIPUNCTURE: CPT

## 2022-09-07 PROCEDURE — 74183 MRI ABD W/O CNTR FLWD CNTR: CPT

## 2022-09-07 RX ADMIN — GADOTERIDOL 17 ML: 279.3 INJECTION, SOLUTION INTRAVENOUS at 09:50

## 2022-10-31 ENCOUNTER — OFFICE VISIT (OUTPATIENT)
Dept: FAMILY MEDICINE CLINIC | Age: 62
End: 2022-10-31
Payer: COMMERCIAL

## 2022-10-31 VITALS
OXYGEN SATURATION: 86 % | TEMPERATURE: 98.5 F | WEIGHT: 186 LBS | DIASTOLIC BLOOD PRESSURE: 81 MMHG | BODY MASS INDEX: 34.02 KG/M2 | SYSTOLIC BLOOD PRESSURE: 124 MMHG | HEART RATE: 106 BPM

## 2022-10-31 DIAGNOSIS — J20.9 ACUTE BRONCHITIS, UNSPECIFIED ORGANISM: Primary | ICD-10-CM

## 2022-10-31 DIAGNOSIS — J44.1 COPD EXACERBATION (HCC): ICD-10-CM

## 2022-10-31 DIAGNOSIS — R19.7 DIARRHEA, UNSPECIFIED TYPE: ICD-10-CM

## 2022-10-31 DIAGNOSIS — I25.110 CORONARY ARTERY DISEASE INVOLVING NATIVE CORONARY ARTERY OF NATIVE HEART WITH UNSTABLE ANGINA PECTORIS (HCC): ICD-10-CM

## 2022-10-31 DIAGNOSIS — R09.02 HYPOXIA: ICD-10-CM

## 2022-10-31 PROCEDURE — 99214 OFFICE O/P EST MOD 30 MIN: CPT | Performed by: FAMILY MEDICINE

## 2022-10-31 PROCEDURE — 3078F DIAST BP <80 MM HG: CPT | Performed by: FAMILY MEDICINE

## 2022-10-31 PROCEDURE — 3074F SYST BP LT 130 MM HG: CPT | Performed by: FAMILY MEDICINE

## 2022-10-31 PROCEDURE — 96372 THER/PROPH/DIAG INJ SC/IM: CPT | Performed by: FAMILY MEDICINE

## 2022-10-31 RX ORDER — DIPHENOXYLATE HYDROCHLORIDE AND ATROPINE SULFATE 2.5; .025 MG/1; MG/1
1 TABLET ORAL 4 TIMES DAILY PRN
Qty: 30 TABLET | Refills: 0 | Status: SHIPPED | OUTPATIENT
Start: 2022-10-31 | End: 2022-11-11 | Stop reason: SDUPTHER

## 2022-10-31 RX ORDER — DOXYCYCLINE HYCLATE 100 MG
100 TABLET ORAL 2 TIMES DAILY
Qty: 20 TABLET | Refills: 0 | Status: SHIPPED | OUTPATIENT
Start: 2022-10-31 | End: 2022-11-10

## 2022-10-31 RX ORDER — BENZONATATE 200 MG/1
200 CAPSULE ORAL 3 TIMES DAILY PRN
Qty: 30 CAPSULE | Refills: 0 | Status: SHIPPED | OUTPATIENT
Start: 2022-10-31 | End: 2022-11-07

## 2022-10-31 RX ORDER — METHYLPREDNISOLONE ACETATE 40 MG/ML
40 INJECTION, SUSPENSION INTRA-ARTICULAR; INTRALESIONAL; INTRAMUSCULAR; SOFT TISSUE ONCE
Status: COMPLETED | OUTPATIENT
Start: 2022-10-31 | End: 2022-10-31

## 2022-10-31 RX ORDER — CEFTRIAXONE 1 G/1
1000 INJECTION, POWDER, FOR SOLUTION INTRAMUSCULAR; INTRAVENOUS ONCE
Status: COMPLETED | OUTPATIENT
Start: 2022-10-31 | End: 2022-10-31

## 2022-10-31 RX ORDER — PREDNISONE 20 MG/1
20 TABLET ORAL DAILY
Qty: 10 TABLET | Refills: 0 | Status: SHIPPED | OUTPATIENT
Start: 2022-10-31 | End: 2022-11-11 | Stop reason: SDUPTHER

## 2022-10-31 RX ADMIN — CEFTRIAXONE 1000 MG: 1 INJECTION, POWDER, FOR SOLUTION INTRAMUSCULAR; INTRAVENOUS at 16:59

## 2022-10-31 RX ADMIN — METHYLPREDNISOLONE ACETATE 40 MG: 40 INJECTION, SUSPENSION INTRA-ARTICULAR; INTRALESIONAL; INTRAMUSCULAR; SOFT TISSUE at 17:02

## 2022-10-31 NOTE — PROGRESS NOTES
Subjective: India Santiago is here to discuss the following issues. She has symptoms consistent with prior episodes of acute bronchitis. She has coughing with mucus production anterior chest congestion and a secondary headache. No fevers or vomiting. She has a long history of COPD and has had some increased wheezing despite the use of albuterol. She has longstanding hypoxia and is concerned this may have worsened. She has not been agreeable to oxygen at home. She also reports diarrhea and has used all over-the-counter medicines without benefit. She has coronary artery disease and has had no recent chest pain pressure. She continues on a combination of medicines including Plavix Lasix lisinopril and metoprolol    Social History     Tobacco Use   Smoking Status Every Day    Packs/day: 0.50    Years: 32.00    Pack years: 16.00    Types: Cigarettes   Smokeless Tobacco Never   Tobacco Comments    1/2 pack, per smoking hx audit in 2010 pt smoked 20 yrs, at heaviest 1 ppd, average 0.75   Allergies:     Atenolol    Objective:  /81   Pulse (!) 106   Temp 98.5 °F (36.9 °C) (Oral)   Wt 186 lb (84.4 kg)   SpO2 (!) 86%   BMI 34.02 kg/m²    Vital signs noted, no distress, ears clear, throat clear, no facial edema, no nasal discharge, no lymphadenopathy, lungs diffuse wheezing persistent cough    Assessment:  1. Acute bronchitis, unspecified organism    2. COPD exacerbation ([address]    3. Hypoxia    4. Diarrhea, unspecified type    5. Coronary artery disease involving native coronary artery of native heart with unstable angina pectoris ([address]            Plan:  I encourage this patient to go to the emergency room. She is hypoxic and symptomatic. She declines.   She has been advised previously and again today that she needs home oxygen but she declines  Depo-Medrol  Rocephin  Prednisone  Doxycycline  Lomotil as needed  Tessalon as needed  Continue all other medicines  Go to the emergency room immediately if

## 2022-10-31 NOTE — PATIENT INSTRUCTIONS
Please read the healthy family handout that you were given and share it with your family. Please compare this printed medication list with your medications at home to be sure they are the same. If you have any medications that are different please contact us immediately at [phone]. Also review your allergies that we have listed, these may also include medications that you have not been able to tolerate, make sure everything listed is correct. If you have any allergies that are different please contact us immediately at [phone]. You may receive a survey in the mail or by email asking about your experience during your visit today. Please complete and return to us so we know how we are serving you.

## 2022-11-01 ENCOUNTER — TELEPHONE (OUTPATIENT)
Dept: FAMILY MEDICINE CLINIC | Age: 62
End: 2022-11-01

## 2022-11-01 DIAGNOSIS — J45.41 MODERATE PERSISTENT ASTHMA WITH ACUTE EXACERBATION: ICD-10-CM

## 2022-11-01 RX ORDER — ALBUTEROL SULFATE 90 UG/1
AEROSOL, METERED RESPIRATORY (INHALATION)
Qty: 9 G | Refills: 0 | Status: SHIPPED | OUTPATIENT
Start: 2022-11-01 | End: 2022-11-14
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
## 2022-11-01 NOTE — TELEPHONE ENCOUNTER
Noted  ok for note for work and please give order for home oxygen for hypoxia and copd. Start at 2 liters and adjust oxygen flow rate to keep oxygen level between 90 and 94.

## 2022-11-01 NOTE — TELEPHONE ENCOUNTER
Left message for patient to call, need to know where she needs note sent to and advise that orders will  be faxed to [facility] for the oxygen

## 2022-11-01 NOTE — LETTER
[address] [id number]  Phone: [phone]  Fax: [phone]    Porfirio Pichardo MD        November 2, 2022     Patient: Melanie Garcia   YOB: 1960   Date of Visit: 10/31/2022       To Whom it May Concern:    Please excuse Melnaie Garcia from work from 10/31/2022 to 11/11/2022. She may return to work on 11/12/2022. If you have any questions or concerns, please don't hesitate to call.     Sincerely,         Porfirio Pichardo MD

## 2022-11-02 NOTE — TELEPHONE ENCOUNTER
Spoke to patient and advised her that her oxygen won't be covered. Advised her that if her oxygen level continues to be low or drop even lower she is to go to the ER. She states she will keep her appt next week, to see if she is better and if is getting documentation for long term oxygen.

## 2022-11-02 NOTE — TELEPHONE ENCOUNTER
Noted  this patient desperately needs home oxygen. Please ask Eleanor Siegel or a  or other [employer] employee to help her with this. She lives alone and it is a very dangerous situation.

## 2022-11-02 NOTE — TELEPHONE ENCOUNTER
Insurance will not cover regardless unless patient goes to ER and then she can get short term oxygen and then be re-evaluated for long term oxygen once she is better.   Patient has been made aware of this but does not want to go to ER

## 2022-11-02 NOTE — TELEPHONE ENCOUNTER
Left another message for patient to call, Gaye called and states that insurance will not pay for oxygen because she is actively sick-when patient is in an exacerbated state the insurance will not pay, she has to be in a chronic stable state before they will pay for permanent oxygen, they will only pay for short term oxygen if patient has been in the hospital. If she is still having low oxygen she needs to go to the ER.

## 2022-11-11 ENCOUNTER — OFFICE VISIT (OUTPATIENT)
Dept: FAMILY MEDICINE CLINIC | Age: 62
End: 2022-11-11
Payer: COMMERCIAL

## 2022-11-11 VITALS
SYSTOLIC BLOOD PRESSURE: 110 MMHG | TEMPERATURE: 98.7 F | HEART RATE: 83 BPM | OXYGEN SATURATION: 86 % | WEIGHT: 187 LBS | DIASTOLIC BLOOD PRESSURE: 70 MMHG | BODY MASS INDEX: 34.2 KG/M2

## 2022-11-11 DIAGNOSIS — R09.02 HYPOXIA: ICD-10-CM

## 2022-11-11 DIAGNOSIS — I10 ESSENTIAL HYPERTENSION: ICD-10-CM

## 2022-11-11 DIAGNOSIS — R19.7 DIARRHEA, UNSPECIFIED TYPE: ICD-10-CM

## 2022-11-11 DIAGNOSIS — I25.110 CORONARY ARTERY DISEASE INVOLVING NATIVE CORONARY ARTERY OF NATIVE HEART WITH UNSTABLE ANGINA PECTORIS (HCC): ICD-10-CM

## 2022-11-11 DIAGNOSIS — E78.00 HYPERCHOLESTEREMIA: ICD-10-CM

## 2022-11-11 DIAGNOSIS — R73.9 HYPERGLYCEMIA: ICD-10-CM

## 2022-11-11 DIAGNOSIS — J96.11 CHRONIC RESPIRATORY FAILURE WITH HYPOXIA (HCC): Primary | ICD-10-CM

## 2022-11-11 PROCEDURE — 3074F SYST BP LT 130 MM HG: CPT | Performed by: FAMILY MEDICINE

## 2022-11-11 PROCEDURE — 99214 OFFICE O/P EST MOD 30 MIN: CPT | Performed by: FAMILY MEDICINE

## 2022-11-11 PROCEDURE — 3078F DIAST BP <80 MM HG: CPT | Performed by: FAMILY MEDICINE

## 2022-11-11 RX ORDER — DIPHENOXYLATE HYDROCHLORIDE AND ATROPINE SULFATE 2.5; .025 MG/1; MG/1
1 TABLET ORAL 4 TIMES DAILY PRN
Qty: 40 TABLET | Refills: 0 | Status: SHIPPED | OUTPATIENT
Start: 2022-11-11 | End: 2022-11-21

## 2022-11-11 RX ORDER — PREDNISONE 20 MG/1
20 TABLET ORAL DAILY
Qty: 10 TABLET | Refills: 0 | Status: SHIPPED | OUTPATIENT
Start: 2022-11-11 | End: 2022-11-21

## 2022-11-11 NOTE — PATIENT INSTRUCTIONS
Please read the healthy family handout that you were given and share it with your family. Please compare this printed medication list with your medications at home to be sure they are the same. If you have any medications that are different please contact us immediately at [phone]. Also review your allergies that we have listed, these may also include medications that you have not been able to tolerate, make sure everything listed is correct. If you have any allergies that are different please contact us immediately at [phone]. You may receive a survey in the mail or by email asking about your experience during your visit today. Please complete and return to us so we know how we are serving you.

## 2022-11-12 DIAGNOSIS — J45.41 MODERATE PERSISTENT ASTHMA WITH ACUTE EXACERBATION: ICD-10-CM

## 2022-11-12 PROBLEM — E11.9 TYPE 2 DIABETES MELLITUS WITHOUT COMPLICATION, WITHOUT LONG-TERM CURRENT USE OF INSULIN (HCC): Status: ACTIVE | Noted: 2022-11-12

## 2022-11-12 LAB
ALT SERPL-CCNC: 26 U/L (ref 10–40)
ANION GAP SERPL CALCULATED.3IONS-SCNC: 15 MMOL/L (ref 3–16)
BUN BLDV-MCNC: 9 MG/DL (ref 7–20)
CALCIUM SERPL-MCNC: 9.7 MG/DL (ref 8.3–10.6)
CHLORIDE BLD-SCNC: 101 MMOL/L (ref 99–110)
CHOLESTEROL, TOTAL: 152 MG/DL (ref 0–199)
CO2: 25 MMOL/L (ref 21–32)
CREAT SERPL-MCNC: 0.9 MG/DL (ref 0.6–1.2)
ESTIMATED AVERAGE GLUCOSE: 177.2 MG/DL
GFR SERPL CREATININE-BSD FRML MDRD: >60 ML/MIN/{1.73_M2}
GLUCOSE BLD-MCNC: 131 MG/DL (ref 70–99)
HBA1C MFR BLD: 7.8 %
HDLC SERPL-MCNC: 47 MG/DL (ref 40–60)
LDL CHOLESTEROL CALCULATED: 71 MG/DL
POTASSIUM SERPL-SCNC: 4.9 MMOL/L (ref 3.5–5.1)
SODIUM BLD-SCNC: 141 MMOL/L (ref 136–145)
TRIGL SERPL-MCNC: 170 MG/DL (ref 0–150)
TSH REFLEX: 0.51 UIU/ML (ref 0.27–4.2)
VLDLC SERPL CALC-MCNC: 34 MG/DL

## 2022-11-12 RX ORDER — METFORMIN HYDROCHLORIDE 500 MG/1
500 TABLET, EXTENDED RELEASE ORAL DAILY
Qty: 30 TABLET | Refills: 5 | Status: SHIPPED | OUTPATIENT
Start: 2022-11-12

## 2022-11-12 NOTE — PROGRESS NOTES
Subjective: Paxton Cruz is here to discuss the following issues. She has a history of chronic respiratory failure with hypoxia. She has been resistant to home oxygen but is now agreeable. Her oxygen levels typically run in the upper 80s. No wheezing cough chest tightness. No chest pain or pressure. She has coronary artery disease without related symptoms and continues on her combination of medicines. She has not required nitroglycerin. She has elevated cholesterol and takes Crestor daily with no muscle aches or other side effects. She has a history of hyperglycemia with no polydipsia or polyuria. She has hypertension and blood pressures have been excellent on her medicines. She has occasional diarrhea and requests a refill on Lomotil to use as needed. Typically this is diet related. She had a recent episode of pneumonia which has greatly improved but she had a reaction to her Rocephin injection causing itching hives and possible syncope. Social History     Tobacco Use   Smoking Status Every Day    Packs/day: 0.50    Years: 32.00    Pack years: 16.00    Types: Cigarettes   Smokeless Tobacco Never   Tobacco Comments    1/2 pack, per smoking hx audit in 2010 pt smoked 20 yrs, at heaviest 1 ppd, average 0.75   Allergies:     Atenolol and Rocephin [ceftriaxone]    Objective:  /70   Pulse 83   Temp 98.7 °F (37.1 °C) (Oral)   Wt 187 lb (84.8 kg)   SpO2 (!) 86%   BMI 34.20 kg/m²    No acute distress, heart regular rate and rhythm without murmur, lungs clear to auscultation easy effort, abdomen soft nondistended, no clubbing or cyanosis mood happy affect reactive    Assessment:  1. Chronic respiratory failure with hypoxia (HCC)    2. Hypoxia    3. Coronary artery disease involving native coronary artery of native heart with unstable angina pectoris ([facility] [address]    4. Hypercholesteremia    5. Hyperglycemia    6. Essential hypertension    7.  Diarrhea, unspecified type            Plan:  She is now agreeable to home oxygen and this was ordered and will be started at 2 L per nasal cannula titrating to maintain saturation at 88 up to 92%  Labs ordered  Refills provided  She will avoid Rocephin in the future due to an apparent allergic reaction as part of treatment of recent pneumonia  Encouraged to get a mammogram  She declines updating immunizations today  Continue current medicines  Follow-up in 2 months or as needed  The diagnoses listed in the assessment above are stable unless otherwise indicated. Age-specific preventative health recommendations were reviewed and a \"Healthy Family Handout\" has been provided. Avoid exposure to tobacco products. Follow COVID-19 CDC guidelines. Read and consider all information provided by the pharmacy regarding prescribed medications before use. Call or return for any problems that arise before the next scheduled appointment. Ana Henley MD    This note was transcribed using a voice recognition software system. Proper technique and careful oversight were used to increase transcription accuracy but inadvertent errors may be present.

## 2022-11-14 RX ORDER — ALBUTEROL SULFATE 90 UG/1
AEROSOL, METERED RESPIRATORY (INHALATION)
Qty: 9 G | Refills: 5 | Status: SHIPPED | OUTPATIENT
Start: 2022-11-14

## 2022-11-15 RX ORDER — CLOPIDOGREL BISULFATE 75 MG/1
75 TABLET ORAL DAILY
Qty: 90 TABLET | Refills: 3 | Status: SHIPPED | OUTPATIENT
Start: 2022-11-15

## 2022-11-15 RX ORDER — ROSUVASTATIN CALCIUM 20 MG/1
20 TABLET, COATED ORAL DAILY
Qty: 90 TABLET | Refills: 3 | Status: SHIPPED | OUTPATIENT
Start: 2022-11-15

## 2022-11-18 NOTE — TELEPHONE ENCOUNTER
11/18/22-2nd attempt to call phone number [phone], no answer, lm for pt to return call to [facility] to schedule follow up appt with HERNANDO for Feb 2023. Letter sent as well.

## 2023-01-30 DIAGNOSIS — J45.41 MODERATE PERSISTENT ASTHMA WITH ACUTE EXACERBATION: ICD-10-CM

## 2023-01-31 RX ORDER — GUAIFENESIN 600 MG/1
TABLET, EXTENDED RELEASE ORAL
Qty: 30 TABLET | Refills: 0 | Status: SHIPPED | OUTPATIENT
Start: 2023-01-31

## 2023-02-13 ENCOUNTER — OFFICE VISIT (OUTPATIENT)
Dept: CARDIOLOGY CLINIC | Age: 63
End: 2023-02-13
Payer: COMMERCIAL

## 2023-02-13 VITALS
WEIGHT: 188.5 LBS | SYSTOLIC BLOOD PRESSURE: 118 MMHG | BODY MASS INDEX: 34.69 KG/M2 | DIASTOLIC BLOOD PRESSURE: 60 MMHG | HEART RATE: 74 BPM | OXYGEN SATURATION: 92 % | HEIGHT: 62 IN

## 2023-02-13 DIAGNOSIS — I10 ESSENTIAL HYPERTENSION: ICD-10-CM

## 2023-02-13 DIAGNOSIS — I49.3 PREMATURE VENTRICULAR CONTRACTIONS: ICD-10-CM

## 2023-02-13 DIAGNOSIS — E78.2 MIXED HYPERLIPIDEMIA: ICD-10-CM

## 2023-02-13 DIAGNOSIS — I25.110 CORONARY ARTERY DISEASE INVOLVING NATIVE CORONARY ARTERY OF NATIVE HEART WITH UNSTABLE ANGINA PECTORIS (HCC): Primary | ICD-10-CM

## 2023-02-13 DIAGNOSIS — G47.33 OSA (OBSTRUCTIVE SLEEP APNEA): ICD-10-CM

## 2023-02-13 DIAGNOSIS — Z72.0 TOBACCO ABUSE: ICD-10-CM

## 2023-02-13 DIAGNOSIS — J43.1 PANLOBULAR EMPHYSEMA (HCC): ICD-10-CM

## 2023-02-13 PROCEDURE — 99214 OFFICE O/P EST MOD 30 MIN: CPT | Performed by: NURSE PRACTITIONER

## 2023-02-13 PROCEDURE — 3078F DIAST BP <80 MM HG: CPT | Performed by: NURSE PRACTITIONER

## 2023-02-13 PROCEDURE — 3074F SYST BP LT 130 MM HG: CPT | Performed by: NURSE PRACTITIONER

## 2023-02-13 NOTE — PROGRESS NOTES
[facility]   Cardiac Evaluation    Primary Care Doctor: Nina Torre MD    Chief Complaint   Patient presents with    6 Month Follow-Up    Hyperlipidemia    Hypertension    Coronary Artery Disease        Assessment:    1. Coronary artery disease involving native coronary artery of native heart with unstable angina pectoris ([facility] [address]    2. Essential hypertension    3. Mixed hyperlipidemia    4. Premature ventricular contractions    5. Tobacco abuse    6. RYLIE (obstructive sleep apnea)    7. Panlobular emphysema ([facility] [address]        Plan:   Stay off the lisinopril for now  No change in other heart medicines (aspirin, Plavix, rosuvastatin, metoprolol)  Follow up with Dr. Jazmin Leavitt at [facility] office location in 6 months     Vitals:    02/13/23 1318   BP: 118/60   Site: Right Upper Arm   Position: Sitting   Cuff Size: Medium Adult   Pulse: 74   SpO2: 92%   Weight: 188 lb 8 oz (85.5 kg)   Height: 5' 2\" (1.575 m)       Primary Cardiologist: Dr. Natalie Mc     History of Present Illness:   I had the pleasure of seeing Ami Patterson (63 y.o.) in follow up for CAD s/p PCI to RCA (2014, STEMI), LCx (2014) and LAD (2017), HTN, HLD, RYLIE, emphysema and smoker. Blood work from November reviewed and stable. She is doing fair. She has cut back at work, now working just 3 days per week. She is enjoying her time at home now. Has not seen the grandkids recently. The twins are now 2 but still small (20-22 lbs). She doesn't exercise but walks to [employer] box, through stores. Has a fear of stairs so has to walk around outside to get to basement. She does her own yard work/ mowing. She is now considered borderline diabetic and considering starting metformin. She is trying to stay away from bread which is her down fall. She stopped the lisinopril when started having a lightheadedness feeling when she went to work and BP was lower. Still taking the water pill.    She only eats 1 meal per day then a handful of cheeze-it's for lunch. Denies any chest pain but had dyspnea on exertion with moving, lift ing at work. She is sleeping better now. Only gets 3 hours sleep per day (better than 1-2 hrs per day). Emely Zhang describes symptoms including fatigue, edema but denies chest pain, dyspnea, palpitations, orthopnea, PND, early saiety, syncope. NYHA:   II  ACC/ AHA Stage:    C    Past Medical History:   has a past medical history of Asthma, CAD (coronary artery disease), Depression, HTN, Hyperglycemia, and Hyperlipidemia. Surgical History:   has a past surgical history that includes Breast biopsy; Coronary angioplasty with stent (03/07/2014); Coronary angioplasty with stent (03/26/2014); and Coronary angioplasty with stent (12/04/2017). Social History:   reports that she has been smoking cigarettes. She has a 16.00 pack-year smoking history. She has never used smokeless tobacco. She reports that she does not drink alcohol and does not use drugs. Family History:   Family History   Problem Relation Age of Onset    Heart Attack Mother     Heart Disease Mother     Diabetes Mother     Heart Failure Father     Heart Disease Father        Home Medications:  Prior to Admission medications    Medication Sig Start Date End Date Taking?  Authorizing Provider   guaiFENesin (MUCINEX) 600 MG extended release tablet TAKE 1 TABLET BY MOUTH TWICE DAILY FOR 15 DAYS 1/31/23  Yes Kamilah Lawson MD   clopidogrel (PLAVIX) 75 MG tablet Take 1 tablet by mouth daily 11/15/22  Yes Cornell Province, APRN - CNP   rosuvastatin (CRESTOR) 20 MG tablet Take 1 tablet by mouth daily 11/15/22  Yes Cornell Province, APRN - CNP   TRELEGY ELLIPTA 100-62.5-25 MCG/INH AEPB Inhale 1 puff by mouth once daily 11/14/22  Yes Kamilah Lawson MD   albuterol sulfate HFA (PROVENTIL;VENTOLIN;PROAIR) 108 (90 Base) MCG/ACT inhaler INHALE 2 PUFFS BY MOUTH EVERY 6 HOURS AS NEEDED FOR WHEEZING FOR SHORTNESS OF BREATH 11/14/22  Yes Kamilah Lawson MD   metoprolol succinate (TOPROL XL) 25 MG extended release tablet Take 1 tablet by mouth once daily 8/22/22  Yes MARY Saleem CNP   furosemide (LASIX) 20 MG tablet Take 1 tablet by mouth once daily 5/17/22  Yes MARY Schulte CNP   albuterol (PROVENTIL) (2.5 MG/3ML) 0.083% nebulizer solution Take 3 mLs by nebulization every 6 hours as needed for Wheezing or Shortness of Breath 5/10/22  Yes Braden Alas MD   albuterol (PROVENTIL) (5 MG/ML) 0.5% nebulizer solution 1 vial every 4 hours as needed for cough or wheezing 5/3/22  Yes MARY Mondragon CNP   betamethasone valerate (VALISONE) 0.1 % cream APPLY TOPICALLY TWO TIMES A DAY AS NEEDED 10/12/21  Yes Braden Alas MD   nitroGLYCERIN (NITROSTAT) 0.4 MG SL tablet Place 1 tablet under the tongue every 5 minutes as needed for Chest pain 1/28/21  Yes MARY Saleem CNP   diclofenac sodium (VOLTAREN) 1 % GEL Apply 2 g topically 4 times daily 8/20/20  Yes Pb Zimmerman DO   aspirin 81 MG chewable tablet Take 1 tablet by mouth daily.  3/27/14  Yes MARY Sy CNP   metFORMIN (GLUCOPHAGE-XR) 500 MG extended release tablet Take 1 tablet by mouth daily  Patient not taking: Reported on 2/13/2023 11/12/22   Braden Alas MD   lisinopril (PRINIVIL;ZESTRIL) 5 MG tablet Take 1 tablet by mouth once daily  Patient not taking: Reported on 2/13/2023 8/8/22   MARY Saleem CNP   phenazopyridine (PYRIDIUM) 200 MG tablet Take 1 tablet by mouth 3 times daily as needed for Pain  Patient not taking: Reported on 2/13/2023 8/5/22   Braden Alas MD        Allergies:  Atenolol and Rocephin [ceftriaxone]     Physical Examination:    Vitals:    02/13/23 1318   BP: 118/60   Site: Right Upper Arm   Position: Sitting   Cuff Size: Medium Adult   Pulse: 74   SpO2: 92%   Weight: 188 lb 8 oz (85.5 kg)   Height: 5' 2\" (1.575 m)     Constitutional and General Appearance: Warm and dry, no apparent distress, normal coloration  HEENT: Normocephalic, atraumatic  Respiratory:  Normal excursion and expansion without use of accessory muscles  Resp Auscultation: Clear to auscultation   Cardiovascular: The apical impulses not displaced  Heart tones are crisp and normal  JVP l8 cm H2O  Regular rate and rhythm, normal S1S2, no m/g/r  Peripheral pulses are symmetrical and full  There is + clubbing and cyanosis of the extremities.   No BLE edema  Pedal Pulses: 2+ and equal   Abdomen:  No masses or tenderness  Liver/Spleen: No Abnormalities Noted  Neurological/Psychiatric:  Alert and oriented in all spheres  Moves all extremities well  Exhibits normal gait balance and coordination  No abnormalities of mood, affect, memory, mentation, or behavior are noted    Lab Data:  Most recent lab results below reviewed in office    CBC:   Lab Results   Component Value Date/Time    WBC 8.5 03/08/2021 04:03 PM    WBC 9.5 12/05/2017 05:38 AM    WBC 9.1 12/04/2017 09:50 AM    RBC 5.46 03/08/2021 04:03 PM    RBC 4.83 12/05/2017 05:38 AM    RBC 5.41 12/04/2017 09:50 AM    HGB 16.8 03/08/2021 04:03 PM    HGB 14.7 12/05/2017 05:38 AM    HGB 16.3 12/04/2017 09:50 AM    HCT 50.9 03/08/2021 04:03 PM    HCT 44.3 12/05/2017 05:38 AM    HCT 49.4 12/04/2017 09:50 AM    MCV 93.1 03/08/2021 04:03 PM    MCV 91.8 12/05/2017 05:38 AM    MCV 91.3 12/04/2017 09:50 AM    RDW 15.2 03/08/2021 04:03 PM    RDW 15.1 12/05/2017 05:38 AM    RDW 15.1 12/04/2017 09:50 AM     03/08/2021 04:03 PM     12/05/2017 05:38 AM     12/04/2017 09:50 AM     BMP:  Lab Results   Component Value Date/Time     11/11/2022 10:32 AM     08/05/2022 10:33 AM     10/12/2021 09:12 AM    K 4.9 11/11/2022 10:32 AM    K 4.5 08/05/2022 10:33 AM    K 4.7 10/12/2021 09:12 AM     11/11/2022 10:32 AM     08/05/2022 10:33 AM     10/12/2021 09:12 AM    CO2 25 11/11/2022 10:32 AM    CO2 28 08/05/2022 10:33 AM    CO2 22 10/12/2021 09:12 AM    BUN 9 11/11/2022 10:32 AM    BUN 13 09/07/2022 07:54 AM    BUN 15 08/05/2022 10:33 AM    CREATININE 0.9 11/11/2022 10:32 AM    CREATININE 0.8 09/07/2022 07:54 AM    CREATININE 0.9 08/05/2022 10:33 AM     BNP: No results found for: PROBNP  LIPID:   Lab Results   Component Value Date/Time    TRIG 170 11/11/2022 10:32 AM    TRIG 130 08/05/2022 10:33 AM    HDL 47 11/11/2022 10:32 AM    HDL 50 08/05/2022 10:33 AM    HDL 42 01/21/2011 09:08 AM    HDL 43 10/21/2010 10:48 AM    LDLCALC 71 11/11/2022 10:32 AM    LDLCALC 76 08/05/2022 10:33 AM    LDLDIRECT 239 06/17/2010 08:50 AM       Cardiac Imaging:  LEXISCAN NUCLEAR STRESS TEST 2/8/2022:   Summary Normal LVEF >60% Normal wall motion  Borderline TID  Basal inferior scar vs attenuation artifact  No ischemia  Stress Protocols   Resting ECG  NSR, normal ekg   Resting HR:104 bpm  Resting BP:181/93 mmHg   Pre-stress physical exam: Pt denies CP. Pt  anxious. Pt c/o SOB that resolved without  intervention. Stress Protocol:Pharmacologic - Lexiscan's   Peak HR:130 bpm       HR/BP product:[id number]  Peak BP:183/101 mmHg  Predicted HR: 161 bpm  % of predicted HR: 81  Test duration: 1 min   ECG Findings  NSR  No ischemic EKG changes. Arrhythmias  PVCs in bigeminy   Symptoms  Pt denies CP. Complications  Procedure complication was none. Stress Interpretation  Normal LVEF >60% Normal wall motion  Borderline TID  Basal inferior scar vs attenuation artifact  No ischemia     ECHO 2/8/2022:    Summary   Left ventricular systolic function is normal with ejection fraction estimated at 55%. No regional wall motion abnormalities. Grade I diastolic dysfunction with normal left ventricular filling pressure. Mild mitral regurgitation. Mild tricuspid regurgitation. Systolic pulmonary artery pressure (SPAP) is estimated at 45 mmHg consistent with mild pulmonary hypertension (Right atrial pressure of 3 mmHg).           Stress Test 9/13/19   Summary  Technically difficult study due to significant bowel uptake  Within limitations of this study, normal LVEF and wall motion  No clear areas of ischemia        LEFT HEART CATH 12/4/17:   LM: luminals  LAD: mid section of disease with a very tight focal 90% in setting of surrounding 70% calcified lesion  LCX: 20% ostial              OM1- ostial 20%              OM2- patent mid stent  RCA: dominant, mid RCA     LVEDP:20  LVEF: 65%     PCI of mid LAD  Stent: Xience alpine 2.5 x 18mm post dilated to 2.64mm     Assessment  1. Successful PCI to mid LAD using xience drug stent              90%--> 0%  2. ASA 81mg qday for life, plavix 75mg po qday for 1 yr w/o inturruption  3. BB, statin    Limited Echo: 12/30/2014  Last echo on 3/8/14 showed EF 45-50% with inferior wall hypokinesis. Compared to previous echo, there has been some slight improvement in wall motion. Normal left ventricle size, wall thickness and systolic function with an  estimated ejection fraction of 55-60%. Mild inferolateral and apical  inferior hypokinesis. Diastolic filling parameters suggests normal diastolic  function. Stress Test: 2/27/14  Moderate sized focus of stress induced myocardial ischemia involving the inferior wall of the left ventricle at the mid heart. ECG correlation is recommended. 2. LVEF 62% + breast attentuation     CATH 3/7/2014  [facility] SUMMARY   ~LM normal   ~LAD Mid 80%   ~Cx Mid 70% focal   ~RCA Mid 100%, L->R collaterals   ~LVG 45% with inferior hk   Impression   ~Angiography w/ multivessel disease   ~LVEDP 18   ~LVG with depressed EF and inferior hypokinesis   Intervention   ~PCI of 100% RCA with [id number]   ~Staged PCI of Cx and LAD recommended     CATH 3/25/2014  1. Left main coronary artery was small, but had no angiographic evidence of   disease, bifurcating into the LAD and left circumflex. 2. The LAD had a 70% lesion noted within the mid LAD just after a septal   branch.  This was negative on the stress test with no evidence of ischemia   whatsoever and therefore left alone at this time. 3. The left circumflex was short. It had a large OM 1 branch that acted   more like a ramus that had very mild disease throughout its course. Just   after this, there was a very short focal segment of 70% to 75% stenosis in   the mid circumflex. This was the lesion that was intervened upon at this   time. 4. The right coronary artery was noted to be dominant. It had a patent   stent that was present in the mid RCA with no evidence of complication. ASSESSMENT: Successful percutaneous intervention to the mid left circumflex   using Xience Xpedition 2.25-mm x 8-mm. The 75% stenosis was reduced to 0%   with no complication and maintenance of ELIGIO 3 flow.          I appreciate the opportunity of cooperating in the care of this individual.    Shaan Carver, MARY - CNP, 2/13/2023, 2:08 PM

## 2023-02-13 NOTE — PATIENT INSTRUCTIONS
Stay off the lisinopril for now  No change in other heart medicines (aspirin, Plavix, rosuvastatin, metoprolol)  Follow up with Dr. Francy Park at [facility] office location in 6 months      Latest Reference Range & Units 9/28/18 09:21 8/4/20 11:19 10/12/21 09:12 8/5/22 10:33 11/11/22 10:32   CHOLESTEROL, TOTAL, [id number] 0 - 199 mg/dL 139 152 146 152 152   HDL Cholesterol 40 - 60 mg/dL 47 44 44 50 47   LDL Calculated <70 mg/dL 68 85 80 76 71   Triglycerides 0 - 150 mg/dL 119 114 112 130 170 (H)

## 2023-03-19 ENCOUNTER — TELEPHONE (OUTPATIENT)
Dept: CASE MANAGEMENT | Age: 63
End: 2023-03-19

## 2023-03-19 DIAGNOSIS — Z87.891 PERSONAL HISTORY OF SMOKING: Primary | ICD-10-CM

## 2023-03-19 NOTE — TELEPHONE ENCOUNTER
Patient due for annual CT Lung Screening. Reminder letter mailed. CT Lung Screening order pended to chart should you choose to sign it.     Bobbi Pineda 178 Lung Navigator  [phone]

## 2023-03-31 RX ORDER — FLUTICASONE FUROATE, UMECLIDINIUM BROMIDE AND VILANTEROL TRIFENATATE 100; 62.5; 25 UG/1; UG/1; UG/1
POWDER RESPIRATORY (INHALATION)
Qty: 60 EACH | Refills: 0 | Status: SHIPPED | OUTPATIENT
Start: 2023-03-31

## 2023-03-31 NOTE — TELEPHONE ENCOUNTER
LOV 11/11/22  FOV None. She was instructed to follow up in [facility] January. I called and lmovm to call the office to make an appt.

## 2023-04-03 DIAGNOSIS — I25.110 CORONARY ARTERY DISEASE INVOLVING NATIVE CORONARY ARTERY OF NATIVE HEART WITH UNSTABLE ANGINA PECTORIS (HCC): Primary | ICD-10-CM

## 2023-04-03 RX ORDER — FUROSEMIDE 20 MG/1
20 TABLET ORAL DAILY
Qty: 90 TABLET | Refills: 3 | Status: SHIPPED | OUTPATIENT
Start: 2023-04-03

## 2023-04-17 ENCOUNTER — OFFICE VISIT (OUTPATIENT)
Dept: FAMILY MEDICINE CLINIC | Age: 63
End: 2023-04-17
Payer: COMMERCIAL

## 2023-04-17 VITALS
DIASTOLIC BLOOD PRESSURE: 73 MMHG | OXYGEN SATURATION: 87 % | HEART RATE: 89 BPM | BODY MASS INDEX: 33.11 KG/M2 | SYSTOLIC BLOOD PRESSURE: 115 MMHG | WEIGHT: 181 LBS

## 2023-04-17 DIAGNOSIS — I25.110 CORONARY ARTERY DISEASE INVOLVING NATIVE CORONARY ARTERY OF NATIVE HEART WITH UNSTABLE ANGINA PECTORIS (HCC): Primary | ICD-10-CM

## 2023-04-17 DIAGNOSIS — R19.7 DIARRHEA, UNSPECIFIED TYPE: ICD-10-CM

## 2023-04-17 DIAGNOSIS — E11.9 TYPE 2 DIABETES MELLITUS WITHOUT COMPLICATION, WITHOUT LONG-TERM CURRENT USE OF INSULIN (HCC): ICD-10-CM

## 2023-04-17 DIAGNOSIS — G47.33 OSA (OBSTRUCTIVE SLEEP APNEA): ICD-10-CM

## 2023-04-17 DIAGNOSIS — R09.02 HYPOXIA: ICD-10-CM

## 2023-04-17 DIAGNOSIS — J45.41 MODERATE PERSISTENT ASTHMA WITH ACUTE EXACERBATION: ICD-10-CM

## 2023-04-17 PROCEDURE — 99214 OFFICE O/P EST MOD 30 MIN: CPT | Performed by: FAMILY MEDICINE

## 2023-04-17 PROCEDURE — 3074F SYST BP LT 130 MM HG: CPT | Performed by: FAMILY MEDICINE

## 2023-04-17 PROCEDURE — 3078F DIAST BP <80 MM HG: CPT | Performed by: FAMILY MEDICINE

## 2023-04-17 PROCEDURE — 3051F HG A1C>EQUAL 7.0%<8.0%: CPT | Performed by: FAMILY MEDICINE

## 2023-04-17 RX ORDER — ALBUTEROL SULFATE 90 UG/1
AEROSOL, METERED RESPIRATORY (INHALATION)
Qty: 9 G | Refills: 5 | Status: SHIPPED | OUTPATIENT
Start: 2023-04-17

## 2023-04-17 RX ORDER — FUROSEMIDE 20 MG/1
20 TABLET ORAL DAILY
Qty: 90 TABLET | Refills: 3 | Status: SHIPPED | OUTPATIENT
Start: 2023-04-17

## 2023-04-17 RX ORDER — DIPHENOXYLATE HYDROCHLORIDE AND ATROPINE SULFATE 2.5; .025 MG/1; MG/1
1 TABLET ORAL 4 TIMES DAILY PRN
Qty: 40 TABLET | Refills: 0 | Status: SHIPPED | OUTPATIENT
Start: 2023-04-17 | End: 2023-04-27

## 2023-04-17 ASSESSMENT — PATIENT HEALTH QUESTIONNAIRE - PHQ9
SUM OF ALL RESPONSES TO PHQ QUESTIONS 1-9: 0
3. TROUBLE FALLING OR STAYING ASLEEP: 0
SUM OF ALL RESPONSES TO PHQ9 QUESTIONS 1 & 2: 0
9. THOUGHTS THAT YOU WOULD BE BETTER OFF DEAD, OR OF HURTING YOURSELF: 0
7. TROUBLE CONCENTRATING ON THINGS, SUCH AS READING THE NEWSPAPER OR WATCHING TELEVISION: 0
SUM OF ALL RESPONSES TO PHQ QUESTIONS 1-9: 0
SUM OF ALL RESPONSES TO PHQ QUESTIONS 1-9: 0
6. FEELING BAD ABOUT YOURSELF - OR THAT YOU ARE A FAILURE OR HAVE LET YOURSELF OR YOUR FAMILY DOWN: 0
5. POOR APPETITE OR OVEREATING: 0
1. LITTLE INTEREST OR PLEASURE IN DOING THINGS: 0
SUM OF ALL RESPONSES TO PHQ QUESTIONS 1-9: 0
8. MOVING OR SPEAKING SO SLOWLY THAT OTHER PEOPLE COULD HAVE NOTICED. OR THE OPPOSITE, BEING SO FIGETY OR RESTLESS THAT YOU HAVE BEEN MOVING AROUND A LOT MORE THAN USUAL: 0
4. FEELING TIRED OR HAVING LITTLE ENERGY: 0
2. FEELING DOWN, DEPRESSED OR HOPELESS: 0

## 2023-04-17 NOTE — PATIENT INSTRUCTIONS
Please read the healthy family handout that you were given and share it with your family. Please compare this printed medication list with your medications at home to be sure they are the same. If you have any medications that are different please contact us immediately at [phone]. Also review your allergies that we have listed, these may also include medications that you have not been able to tolerate, make sure everything listed is correct. If you have any allergies that are different please contact us immediately at [phone]. You may receive a survey in the mail or by email asking about your experience during your visit today. Please complete and return to us so we know how we are serving you.

## 2023-04-18 LAB
ALT SERPL-CCNC: 19 U/L (ref 10–40)
ANION GAP SERPL CALCULATED.3IONS-SCNC: 19 MMOL/L (ref 3–16)
BUN SERPL-MCNC: 13 MG/DL (ref 7–20)
CALCIUM SERPL-MCNC: 9.6 MG/DL (ref 8.3–10.6)
CHLORIDE SERPL-SCNC: 98 MMOL/L (ref 99–110)
CHOLEST SERPL-MCNC: 143 MG/DL (ref 0–199)
CO2 SERPL-SCNC: 22 MMOL/L (ref 21–32)
CREAT SERPL-MCNC: 1 MG/DL (ref 0.6–1.2)
EST. AVERAGE GLUCOSE BLD GHB EST-MCNC: 174.3 MG/DL
GFR SERPLBLD CREATININE-BSD FMLA CKD-EPI: >60 ML/MIN/{1.73_M2}
GLUCOSE SERPL-MCNC: 107 MG/DL (ref 70–99)
HBA1C MFR BLD: 7.7 %
HDLC SERPL-MCNC: 38 MG/DL (ref 40–60)
LDLC SERPL CALC-MCNC: 64 MG/DL
POTASSIUM SERPL-SCNC: 4.3 MMOL/L (ref 3.5–5.1)
SODIUM SERPL-SCNC: 139 MMOL/L (ref 136–145)
TRIGL SERPL-MCNC: 205 MG/DL (ref 0–150)
TSH SERPL DL<=0.005 MIU/L-ACNC: 0.49 UIU/ML (ref 0.27–4.2)
VLDLC SERPL CALC-MCNC: 41 MG/DL

## 2023-04-18 RX ORDER — GLIPIZIDE 2.5 MG/1
2.5 TABLET, EXTENDED RELEASE ORAL DAILY
Qty: 30 TABLET | Refills: 3 | Status: SHIPPED | OUTPATIENT
Start: 2023-04-18

## 2023-04-18 NOTE — PROGRESS NOTES
patient assistance program through [facility] ([facility])  She has significant financial constraints  Follow-up in 4 months fasting or as needed  The diagnoses listed in the assessment above are stable unless otherwise indicated. Age-specific preventative health recommendations were reviewed and a \"Healthy Family Handout\" has been provided. Avoid exposure to tobacco products. Follow COVID-19 CDC guidelines. Read and consider all information provided by the pharmacy regarding prescribed medications before use. Call or return for any problems that arise before the next scheduled appointment. Deborah Traylor MD    This note was transcribed using a voice recognition software system. Proper technique and careful oversight were used to increase transcription accuracy but inadvertent errors may be present.

## 2023-05-08 RX ORDER — FLUTICASONE FUROATE, UMECLIDINIUM BROMIDE AND VILANTEROL TRIFENATATE 100; 62.5; 25 UG/1; UG/1; UG/1
POWDER RESPIRATORY (INHALATION)
Qty: 60 EACH | Refills: 0 | Status: SHIPPED | OUTPATIENT
Start: 2023-05-08

## 2023-05-18 ENCOUNTER — TELEPHONE (OUTPATIENT)
Dept: TELEMETRY | Age: 63
End: 2023-05-18

## 2023-05-18 NOTE — TELEPHONE ENCOUNTER
Patient due for annual CT Lung Screening. Reminder letter mailed. Scan already ordered per provider.     Bert Kaur RN

## 2023-05-23 ENCOUNTER — COMMUNITY OUTREACH (OUTPATIENT)
Dept: FAMILY MEDICINE CLINIC | Age: 63
End: 2023-05-23

## 2023-07-03 DIAGNOSIS — Z12.31 BREAST CANCER SCREENING BY MAMMOGRAM: Primary | ICD-10-CM

## 2023-07-12 ENCOUNTER — HOSPITAL ENCOUNTER (OUTPATIENT)
Dept: CT IMAGING | Age: 63
Discharge: HOME | End: 2023-07-12
Attending: FAMILY MEDICINE
Payer: COMMERCIAL

## 2023-07-12 DIAGNOSIS — Z87.891 PERSONAL HISTORY OF SMOKING: ICD-10-CM

## 2023-07-12 PROCEDURE — 71271 CT THORAX LUNG CANCER SCR C-: CPT

## 2023-07-18 ENCOUNTER — TELEPHONE (OUTPATIENT)
Dept: CASE MANAGEMENT | Age: 63
End: 2023-07-18

## 2023-07-18 DIAGNOSIS — R91.8 LUNG MASS: Primary | ICD-10-CM

## 2023-07-18 NOTE — TELEPHONE ENCOUNTER
Per imaging report CT Lung Screening 7/12/2023, [id number]:    On the right, there is a new dominant irregular nodule seen in the right  lower lobe measuring 1.8 cm x 1.8 cm, with small surrounding tree-in-bud  nodules. Per new process Pulmonology Referral has been pended to chart should you choose to sign. Please make patient aware of result and sign order for office of preference and delete other office order.      Thank you,  Emily Haines You Lung Navigator  [phone]

## 2023-07-19 ENCOUNTER — TELEPHONE (OUTPATIENT)
Dept: PULMONOLOGY | Age: 63
End: 2023-07-19

## 2023-07-19 NOTE — TELEPHONE ENCOUNTER
Referral received from Dr Ondina Sethi lung mass. Please advise on scheduling       Narrative & Impression  EXAMINATION:  LOW DOSE SCREENING CT OF THE CHEST WITHOUT CONTRAST     7/12/2023 7:07 am     TECHNIQUE:  Low dose lung cancer screening CT of the chest was performed without the  administration of intravenous contrast.  Multiplanar reformatted images are  provided for review. Automated exposure control, iterative reconstruction,  and/or weight based adjustment of the mA/kV was utilized to reduce the  radiation dose to as low as reasonably achievable. COMPARISON:  April 2022     HISTORY:  ORDERING SYSTEM PROVIDED HISTORY: Personal history of smoking  TECHNOLOGIST PROVIDED HISTORY:  Age: 63 y.o. Smoking History:  Social History  Tobacco Use  Smoking status: Every Day  Packs/day: 0.75  Years: 33.00  Pack years: 24.75  Types: Cigarettes  Smokeless tobacco: Never  Tobacco comments: 1/2 pack, per smoking hx audit in 2010 pt smoked 20 yrs, at  heaviest 1 ppd, average 0.75  Vaping Use  Vaping Use: Never used  Alcohol use: No  Drug use: No     Pack years: 24.75  Last CT lung screen: 4/6/2022  Is there documentation of shared decision making? ->Yes  Does the patient show any signs or symptoms of lung cancer? ->No  Is this the first (baseline) CT or an annual exam?->Annual  Is this a low dose CT or a routine CT?->Low Dose CT  Smoking Status? ->Every Day  Smoking packs per day?->.75  Years smoking?->33  CTDlvol (mGy)->1.66  DLP (mGy*cm)->57.67  Reconstructed image width (mm)->2.5  Reason for Exam: Personal history of smoking     FINDINGS:  Mediastinum:  Thyroid gland is unchanged. Atherosclerotic change is seen in  aorta. Severe coronary artery calcification is seen. Atherosclerotic change  seen in aorta. Small hiatal hernia seen. There is nonspecific thickening at  the GE junction. .  Small mediastinal and hilar nodes are noted. Lungs/Pleura:  Mild-moderate underlying emphysema is seen. No large blebs or  bulla.

## 2023-07-19 NOTE — TELEPHONE ENCOUNTER
Npt r/b Vera Si, Pulmonary Nodule  Please advise on scheduling    FINDINGS:  Mediastinum:  Thyroid gland is unchanged. Atherosclerotic change is seen in  aorta. Severe coronary artery calcification is seen. Atherosclerotic change  seen in aorta. Small hiatal hernia seen. There is nonspecific thickening at  the GE junction. .  Small mediastinal and hilar nodes are noted. Lungs/Pleura:  Mild-moderate underlying emphysema is seen. No large blebs or  bulla. A few noncalcified pulmonary nodules are seen on the left, measuring  up to 5 mm, new compared to prior. On the right, there is a new dominant irregular nodule seen in the right  lower lobe measuring 1.8 cm x 1.8 cm, with small surrounding tree-in-bud  nodules. Upper Abdomen:  Hypodense nodule left adrenal gland is unchanged measuring up  to 3.4 cm, likely adenoma. Soft Tissues/Bones: Spurring is seen in the spine. Spurring is seen in the  shoulder joints. IMPRESSION:  New pulmonary nodules are seen bilaterally, the largest of which is seen on  the right. .  These may simply be postinflammatory-infectious     Severe coronary artery calcification     Left adrenal adenoma     LUNG RADS:  Lung-RADS 4B - Very Suspicious ()     Management:  Diagnostic chest CT with or without contrast; PET/CT may be  considered if there is a >= 8 mm solid nodule or solid component; tissue  sampling; and/or referral for further clinical evaluation. Management  depends on clinical evaluation, patient preference, and the probability of  malignancy.

## 2023-07-21 ENCOUNTER — TELEPHONE (OUTPATIENT)
Dept: CASE MANAGEMENT | Age: 63
End: 2023-07-21

## 2023-07-21 NOTE — TELEPHONE ENCOUNTER
Left  VM with [facility] Pulmonology phone number.     Thank you,  Emily Orta Lung Navigator  [phone]

## 2023-07-27 ENCOUNTER — TELEPHONE (OUTPATIENT)
Dept: PULMONOLOGY | Age: 63
End: 2023-07-27

## 2023-07-27 ENCOUNTER — OFFICE VISIT (OUTPATIENT)
Dept: PULMONOLOGY | Age: 63
End: 2023-07-27
Payer: COMMERCIAL

## 2023-07-27 VITALS
WEIGHT: 178.6 LBS | HEART RATE: 98 BPM | SYSTOLIC BLOOD PRESSURE: 122 MMHG | BODY MASS INDEX: 32.87 KG/M2 | HEIGHT: 62 IN | OXYGEN SATURATION: 90 % | RESPIRATION RATE: 20 BRPM | DIASTOLIC BLOOD PRESSURE: 64 MMHG

## 2023-07-27 DIAGNOSIS — I25.10 CORONARY ARTERY DISEASE INVOLVING NATIVE CORONARY ARTERY OF NATIVE HEART WITHOUT ANGINA PECTORIS: ICD-10-CM

## 2023-07-27 DIAGNOSIS — R91.1 PULMONARY NODULE: Primary | ICD-10-CM

## 2023-07-27 DIAGNOSIS — J44.9 CHRONIC OBSTRUCTIVE PULMONARY DISEASE, UNSPECIFIED COPD TYPE (HCC): ICD-10-CM

## 2023-07-27 PROCEDURE — 3078F DIAST BP <80 MM HG: CPT | Performed by: INTERNAL MEDICINE

## 2023-07-27 PROCEDURE — 3074F SYST BP LT 130 MM HG: CPT | Performed by: INTERNAL MEDICINE

## 2023-07-27 PROCEDURE — 99204 OFFICE O/P NEW MOD 45 MIN: CPT | Performed by: INTERNAL MEDICINE

## 2023-07-27 NOTE — TELEPHONE ENCOUNTER
PET scan scheduled for 8/3/23 at 8:30 am, arrival time 8:00 am at [facility]. Watch for results.

## 2023-07-27 NOTE — PROGRESS NOTES
Patient oxygen on room air rest 87-89%.   After resting for 5 minutes saturation came up to 90% on room air at rest.
obvious thyroid mass. CV: Normal rate, regular rhythm, normal heart sounds. No right ventricular heave. No lower extremity edema. PULM/CHEST: + wheezes. No rales. Chest wall is not dull to percussion. No accessory muscle usage or stridor. ABDOMINAL: Soft. Bowel sounds present. No distension or hernia. No tenderness. MUSCULOSKELETAL: No cyanosis. No clubbing. No obvious joint deformity. LYMPHATIC: No cervical or supraclavicular adenopathy. SKIN: Skin is warm and dry. No rash or nodules on the exposed extremities. PSYCHIATRIC: Normal mood and affect. Behavior is normal.  No anxiety. NEUROLOGIC: Alert, awake and oriented. PERRL. Speech fluent    DATA:  - PFT-     - Imaging -   LDCT 7/12/23  Mediastinum:  Thyroid gland is unchanged. Atherosclerotic change is seen inaorta. Severe coronary artery calcification is seen. Atherosclerotic change seen in aorta. Small hiatal hernia seen. There is nonspecific thickening at the GE junction. .  Small mediastinal and hilar nodes are noted. Lungs/Pleura:  Mild-moderate underlying emphysema is seen. No large blebs or bulla. A few noncalcified pulmonary nodules are seen on the left, measuring up to 5 mm, new compared to prior. On the right, there is a new dominant irregular nodule seen in the right  lower lobe measuring 1.8 cm x 1.8 cm, with small surrounding tree-in-bud  nodules. Upper Abdomen:  Hypodense nodule left adrenal gland is unchanged measuring upto 3.4 cm, likely adenoma. Soft Tissues/Bones: Spurring is seen in the spine. Spurring is seen in the  shoulder joints. IMPRESSION:   -New pulmonary nodules are seen bilaterally, the largest of which is seen on the right. .  These may simply be postinflammatory-infectious   -Severe coronary artery calcification   -Left adrenal adenoma    - Cytopathology & Labs -   - Sleep -   - Cardiac Testing -    STRESS 2/8/22 no ischemia, EF 60%  ECHO 2/8/22 EF 55%, grade I DD, SPAP 45    ASSESSMENT:  Pulmonary

## 2023-07-29 DIAGNOSIS — I10 ESSENTIAL HYPERTENSION: ICD-10-CM

## 2023-07-29 DIAGNOSIS — I49.3 PREMATURE VENTRICULAR CONTRACTIONS: ICD-10-CM

## 2023-07-31 RX ORDER — METOPROLOL SUCCINATE 25 MG/1
TABLET, EXTENDED RELEASE ORAL
Qty: 90 TABLET | Refills: 0 | Status: SHIPPED | OUTPATIENT
Start: 2023-07-31

## 2023-07-31 RX ORDER — LISINOPRIL 5 MG/1
TABLET ORAL
Qty: 90 TABLET | Refills: 0 | OUTPATIENT
Start: 2023-07-31

## 2023-08-03 ENCOUNTER — HOSPITAL ENCOUNTER (OUTPATIENT)
Dept: PET IMAGING | Age: 63
Discharge: HOME | End: 2023-08-03
Payer: COMMERCIAL

## 2023-08-03 DIAGNOSIS — R91.1 PULMONARY NODULE: ICD-10-CM

## 2023-08-03 PROCEDURE — 78815 PET IMAGE W/CT SKULL-THIGH: CPT

## 2023-08-03 PROCEDURE — A9552 F18 FDG: HCPCS | Performed by: INTERNAL MEDICINE

## 2023-08-03 PROCEDURE — 3430000000 HC RX DIAGNOSTIC RADIOPHARMACEUTICAL: Performed by: INTERNAL MEDICINE

## 2023-08-03 RX ORDER — FLUDEOXYGLUCOSE F 18 200 MCI/ML
14.73 INJECTION, SOLUTION INTRAVENOUS
Status: COMPLETED | OUTPATIENT
Start: 2023-08-03 | End: 2023-08-03

## 2023-08-03 RX ADMIN — FLUDEOXYGLUCOSE F 18 14.73 MILLICURIE: 200 INJECTION, SOLUTION INTRAVENOUS at 08:17

## 2023-08-08 ENCOUNTER — HOSPITAL ENCOUNTER (OUTPATIENT)
Dept: PULMONOLOGY | Age: 63
Discharge: HOME | End: 2023-08-08
Payer: COMMERCIAL

## 2023-08-08 VITALS — OXYGEN SATURATION: 90 %

## 2023-08-08 DIAGNOSIS — R91.1 PULMONARY NODULE: ICD-10-CM

## 2023-08-08 PROCEDURE — 94729 DIFFUSING CAPACITY: CPT

## 2023-08-08 PROCEDURE — 94618 PULMONARY STRESS TESTING: CPT

## 2023-08-08 PROCEDURE — 6370000000 HC RX 637 (ALT 250 FOR IP): Performed by: INTERNAL MEDICINE

## 2023-08-08 PROCEDURE — 94726 PLETHYSMOGRAPHY LUNG VOLUMES: CPT

## 2023-08-08 PROCEDURE — 94060 EVALUATION OF WHEEZING: CPT

## 2023-08-08 RX ORDER — FLUTICASONE FUROATE, UMECLIDINIUM BROMIDE AND VILANTEROL TRIFENATATE 100; 62.5; 25 UG/1; UG/1; UG/1
POWDER RESPIRATORY (INHALATION)
Qty: 60 EACH | Refills: 1 | Status: SHIPPED | OUTPATIENT
Start: 2023-08-08

## 2023-08-08 RX ORDER — ALBUTEROL SULFATE 90 UG/1
4 AEROSOL, METERED RESPIRATORY (INHALATION) ONCE
Status: COMPLETED | OUTPATIENT
Start: 2023-08-08 | End: 2023-08-08

## 2023-08-08 RX ADMIN — Medication 4 PUFF: at 08:45

## 2023-08-09 ENCOUNTER — OFFICE VISIT (OUTPATIENT)
Dept: PULMONOLOGY | Age: 63
End: 2023-08-09
Payer: COMMERCIAL

## 2023-08-09 ENCOUNTER — TELEPHONE (OUTPATIENT)
Dept: CARDIOLOGY CLINIC | Age: 63
End: 2023-08-09

## 2023-08-09 VITALS
WEIGHT: 178 LBS | BODY MASS INDEX: 32.76 KG/M2 | HEIGHT: 62 IN | OXYGEN SATURATION: 90 % | RESPIRATION RATE: 16 BRPM | SYSTOLIC BLOOD PRESSURE: 126 MMHG | HEART RATE: 84 BPM | DIASTOLIC BLOOD PRESSURE: 72 MMHG

## 2023-08-09 DIAGNOSIS — J43.2 CENTRILOBULAR EMPHYSEMA (HCC): ICD-10-CM

## 2023-08-09 DIAGNOSIS — R91.1 PULMONARY NODULE: Primary | ICD-10-CM

## 2023-08-09 PROBLEM — R06.02 SHORTNESS OF BREATH: Status: ACTIVE | Noted: 2023-08-09

## 2023-08-09 PROCEDURE — 3074F SYST BP LT 130 MM HG: CPT | Performed by: INTERNAL MEDICINE

## 2023-08-09 PROCEDURE — 94618 PULMONARY STRESS TESTING: CPT | Performed by: STUDENT IN AN ORGANIZED HEALTH CARE EDUCATION/TRAINING PROGRAM

## 2023-08-09 PROCEDURE — 94060 EVALUATION OF WHEEZING: CPT | Performed by: STUDENT IN AN ORGANIZED HEALTH CARE EDUCATION/TRAINING PROGRAM

## 2023-08-09 PROCEDURE — 94726 PLETHYSMOGRAPHY LUNG VOLUMES: CPT | Performed by: STUDENT IN AN ORGANIZED HEALTH CARE EDUCATION/TRAINING PROGRAM

## 2023-08-09 PROCEDURE — 99214 OFFICE O/P EST MOD 30 MIN: CPT | Performed by: INTERNAL MEDICINE

## 2023-08-09 PROCEDURE — 3078F DIAST BP <80 MM HG: CPT | Performed by: INTERNAL MEDICINE

## 2023-08-09 PROCEDURE — 94729 DIFFUSING CAPACITY: CPT | Performed by: STUDENT IN AN ORGANIZED HEALTH CARE EDUCATION/TRAINING PROGRAM

## 2023-08-09 RX ORDER — PREDNISONE 10 MG/1
TABLET ORAL
Qty: 15 TABLET | Refills: 0 | Status: SHIPPED | OUTPATIENT
Start: 2023-08-09

## 2023-08-09 RX ORDER — AZITHROMYCIN 250 MG/1
250 TABLET, FILM COATED ORAL SEE ADMIN INSTRUCTIONS
Qty: 6 TABLET | Refills: 0 | Status: SHIPPED | OUTPATIENT
Start: 2023-08-09 | End: 2023-08-14

## 2023-08-09 NOTE — TELEPHONE ENCOUNTER
Per Revised Cardiac Risk Index Michael Matas Criteria):   Estimated Risk of Adverse Outcome with Non-cardiac Surgery: LOW  Estimated Rate of Myocardial Infarction, Pulmonary Edema, Ventricular Fibrillation, Cardiac Arrest, or Complete Heart Block:  0.9%    Okay to hold the Plavix for 5 days. Byron Sake continuing the aspirin but if needed , hold for the shortest period of time (hx of STEMI).      Thank you, Pako Goss

## 2023-08-09 NOTE — TELEPHONE ENCOUNTER
Get cardiology approval for pt to hold Plavix/ASA. Once approved, give pt time for robotic bronch on 8/21/23. She has all prep, needs time. Called Cardiology and L/M with [facility] asking for approval for pt to hold Plavix and ASA. CT scheduled for 8/16/23, need to have disc made for Dr. Patricia Schaefer to plan.      Hold plavix for 5 days prior to procedure, ASA two days prior to procedure, please confirm okay with Dr. Maxwell Stack - has appt with Dr. Maxwell Stack on 8/21/23

## 2023-08-09 NOTE — TELEPHONE ENCOUNTER
CARDIAC CLEARANCE REQUEST    What type of procedure are you having:  Bronchoscopy   Are you taking any blood thinners:  Aspirin and Plavix- Plavix hold for 5, Asprin for 2 days   Type on anesthesia:  Mac  When is your procedure scheduled for:  08.21  What physician is performing your procedure:  Dr. Mitch De La Paz   Phone Number:  [phone]  Fax number to send the letter:   [phone]

## 2023-08-09 NOTE — PROCEDURES
Pulmonary Function Testing      Patient name: [address] [place] [address] Unit #:   [id number]   Date of test:  8/8/2023   Date of interpretation:   8/9/2023    Ms. Collin Peters is a 63y.o. year-old current smoker. The spirometry data were acceptable and reproducible. Spirometry:  Flow volume loops were obstructed. The FEV-1/FVC ratio was decreased. The FEV-1 was severe. The FVC was decreased. Response to inhaled bronchodilators (albuterol) was not significant. Lung volumes:  Lung volumes were tested by plethysmography. The total lung capacity was increased. The residual volume was increased. The ratio of residual volume to total lung capacity (RV/TLC) was 213%, which was increased. Diffusion capacity was found to be 44% which is Moderately decreased. Interpretation:  Severe obstructive defect with no significant bronchodilator response and hyperinflation and air trapping. The diffusion is moderately reduced.     Comments: None      MD Veronica Dawkins [facility]

## 2023-08-09 NOTE — PROGRESS NOTES
pulmonary nodules are seen on the left, measuring up to 5 mm, new compared to prior. On the right, there is a new dominant irregular nodule seen in the right  lower lobe measuring 1.8 cm x 1.8 cm, with small surrounding tree-in-bud  nodules. Upper Abdomen:  Hypodense nodule left adrenal gland is unchanged measuring upto 3.4 cm, likely adenoma. Soft Tissues/Bones: Spurring is seen in the spine. Spurring is seen in the  shoulder joints. IMPRESSION:   -New pulmonary nodules are seen bilaterally, the largest of which is seen on the right. .  These may simply be postinflammatory-infectious   -Severe coronary artery calcification   -Left adrenal adenoma    CT PET 8/3/23  HEAD/NECK: In the craniocervical soft tissues, physiologic activity is  favored. Bilateral carotid artery calcification. CHEST: Two foci of intense activity are demonstrated at the right hilum, SUV  maximum 6.5 and 6.4 respectively. Intermediate level activity is seen within  lymph nodes in the pretracheal, precarinal, AP window region. No  hypermetabolic axillary adenopathy. Calcification of the thoracic aorta. Coronary artery calcification. Within the right lower lobe, adjacent to the major fissure, a 1.9 x 1.4 cm  pulmonary nodule is again demonstrated with irregular margins, SUV maximum  3.8. Tree-in-bud type nodularity is seen adjacent, as well as a 3 mm  isolated satellite nodule. Additional punctate nodules are seen bilaterally,  too small to characterize by PET. No confluent airspace disease. No  pneumothorax or pleural effusion. ABDOMEN/PELVIS: Excretion of activity is seen from bilateral kidneys and  activity is seen within the urinary bladder. Bowel activity is seen which  can be physiologically variable. 3.1 x 2.0 cm left adrenal nodule, Hounsfield units 9, compatible with adrenal  adenoma. Low-density thickening is seen of the medial limb of the right  adrenal gland. Focal fat along the falciform.   Calcification

## 2023-08-09 NOTE — PROCEDURES
6-minute walk test    The patient walked without assistance throughout the entire walk. At the time of the test, she reported a Rufina scale of 5 with an oxygen saturation of 87% on room air and heart rate of 95 bpm.  She was placed on 2 L nasal cannula. 6-minute walk test was performed. She went the entire 6-minute duration. The patient ambulated for total distance of 960 feet on up to 4 L nasal cannula. Her dyspnea at the end of the test was found to be 5 on the Rufina scale with an oxygen saturation of 92% on 4 L nasal cannula. The patient's expected value was 56 feet for an average healthy female of her age group. Her highest heart rate during the test was 114 bpm.  Her lowest oxygen saturation during the test was 87% at baseline and 2 minutes. She was increased to 4 L nasal cannula at 2 minutes. Impression: Patient had desaturation less than 88% with exertion and at rest.  Recommend up to 4 L nasal cannula.     Josh Reyes MD  [facility]

## 2023-08-09 NOTE — PATIENT INSTRUCTIONS
Bronchoscopy has been set up for 8/21/23 arrival time __________ at [facility]. Please enter at [facility]. Nothing to eat or drink after midnight prior to the procedure. Must have a  to bring you to and from the procedure. Hold blood thinners as instructed prior to the procedure. Hold Plavix for 5 days and Aspirin for 2 days.

## 2023-08-11 ENCOUNTER — ANESTHESIA EVENT (OUTPATIENT)
Dept: ENDOSCOPY | Age: 63
End: 2023-08-11
Payer: COMMERCIAL

## 2023-08-14 NOTE — TELEPHONE ENCOUNTER
Patient called with message left for patient to call back to office to inform of scheduled bronch on 8/21/23 at 10 am, arrival time 9 am.  Pt will need to hold Plavix starting 8/16/23 prior to procedure. Getting further clarification from Dr. Jose Luis Valente about holding ASA before notifying pt. Also need to inform pt of scheduled follow up with Dr. Jose Luis Valente for results.

## 2023-08-15 NOTE — TELEPHONE ENCOUNTER
Pt returned call and was informed of bronch appt date/time/prep with verbal understanding. Pt wasn't able to make follow up appt so it was rescheduled to 8/29/23.

## 2023-08-15 NOTE — TELEPHONE ENCOUNTER
Called pt and in the middle of leaving VM asking for call back asap today to inform of scheduled bronch the message cut off. I tried calling back again but the VM didn't even , line just disconnected. Will try back later.

## 2023-08-16 ENCOUNTER — HOSPITAL ENCOUNTER (OUTPATIENT)
Dept: CT IMAGING | Age: 63
Discharge: HOME | End: 2023-08-16
Payer: COMMERCIAL

## 2023-08-16 DIAGNOSIS — R91.1 PULMONARY NODULE: ICD-10-CM

## 2023-08-16 PROCEDURE — 71250 CT THORAX DX C-: CPT

## 2023-08-20 ASSESSMENT — LIFESTYLE VARIABLES: SMOKING_STATUS: 1

## 2023-08-20 ASSESSMENT — ENCOUNTER SYMPTOMS: SHORTNESS OF BREATH: 1

## 2023-08-21 ENCOUNTER — APPOINTMENT (OUTPATIENT)
Dept: GENERAL RADIOLOGY | Age: 63
End: 2023-08-21
Attending: INTERNAL MEDICINE
Payer: COMMERCIAL

## 2023-08-21 ENCOUNTER — ANESTHESIA (OUTPATIENT)
Dept: ENDOSCOPY | Age: 63
End: 2023-08-21
Payer: COMMERCIAL

## 2023-08-21 ENCOUNTER — HOSPITAL ENCOUNTER (OUTPATIENT)
Age: 63
Setting detail: OUTPATIENT SURGERY
Discharge: HOME | End: 2023-08-21
Attending: INTERNAL MEDICINE | Admitting: INTERNAL MEDICINE
Payer: COMMERCIAL

## 2023-08-21 VITALS
TEMPERATURE: 97.7 F | OXYGEN SATURATION: 97 % | BODY MASS INDEX: 32.76 KG/M2 | WEIGHT: 178 LBS | HEART RATE: 97 BPM | HEIGHT: 62 IN | SYSTOLIC BLOOD PRESSURE: 159 MMHG | DIASTOLIC BLOOD PRESSURE: 78 MMHG | RESPIRATION RATE: 22 BRPM

## 2023-08-21 PROBLEM — R91.1 PULMONARY NODULE: Status: ACTIVE | Noted: 2023-08-21

## 2023-08-21 LAB
ANION GAP SERPL CALCULATED.3IONS-SCNC: 11 MMOL/L (ref 3–16)
BUN SERPL-MCNC: 12 MG/DL (ref 7–20)
CALCIUM SERPL-MCNC: 9.5 MG/DL (ref 8.3–10.6)
CHLORIDE SERPL-SCNC: 103 MMOL/L (ref 99–110)
CO2 SERPL-SCNC: 25 MMOL/L (ref 21–32)
CREAT SERPL-MCNC: 0.8 MG/DL (ref 0.6–1.2)
DEPRECATED RDW RBC AUTO: 14.9 % (ref 12.4–15.4)
GFR SERPLBLD CREATININE-BSD FMLA CKD-EPI: >60 ML/MIN/{1.73_M2}
GLUCOSE BLD-MCNC: 114 MG/DL (ref 70–99)
GLUCOSE SERPL-MCNC: 150 MG/DL (ref 70–99)
HCT VFR BLD AUTO: 50.2 % (ref 36–48)
HGB BLD-MCNC: 16.8 G/DL (ref 12–16)
MCH RBC QN AUTO: 30.5 PG (ref 26–34)
MCHC RBC AUTO-ENTMCNC: 33.6 G/DL (ref 31–36)
MCV RBC AUTO: 90.9 FL (ref 80–100)
PERFORMED ON: ABNORMAL
PLATELET # BLD AUTO: 293 K/UL (ref 135–450)
PMV BLD AUTO: 7.9 FL (ref 5–10.5)
POTASSIUM SERPL-SCNC: 4.5 MMOL/L (ref 3.5–5.1)
RBC # BLD AUTO: 5.52 M/UL (ref 4–5.2)
SODIUM SERPL-SCNC: 139 MMOL/L (ref 136–145)
WBC # BLD AUTO: 8.8 K/UL (ref 4–11)

## 2023-08-21 PROCEDURE — 87205 SMEAR GRAM STAIN: CPT

## 2023-08-21 PROCEDURE — 88177 CYTP FNA EVAL EA ADDL: CPT

## 2023-08-21 PROCEDURE — 31629 BRONCHOSCOPY/NEEDLE BX EACH: CPT | Performed by: INTERNAL MEDICINE

## 2023-08-21 PROCEDURE — 88305 TISSUE EXAM BY PATHOLOGIST: CPT

## 2023-08-21 PROCEDURE — 31653 BRONCH EBUS SAMPLNG 3/> NODE: CPT | Performed by: INTERNAL MEDICINE

## 2023-08-21 PROCEDURE — 2500000003 HC RX 250 WO HCPCS

## 2023-08-21 PROCEDURE — 88172 CYTP DX EVAL FNA 1ST EA SITE: CPT

## 2023-08-21 PROCEDURE — 87102 FUNGUS ISOLATION CULTURE: CPT

## 2023-08-21 PROCEDURE — 2709999900 HC NON-CHARGEABLE SUPPLY: Performed by: INTERNAL MEDICINE

## 2023-08-21 PROCEDURE — 3609011900 HC BRONCHOSCOPY NEEDLE BX TRACHEA MAIN STEM&/BRON: Performed by: INTERNAL MEDICINE

## 2023-08-21 PROCEDURE — C9399 UNCLASSIFIED DRUGS OR BIOLOG: HCPCS | Performed by: NURSE ANESTHETIST, CERTIFIED REGISTERED

## 2023-08-21 PROCEDURE — 87206 SMEAR FLUORESCENT/ACID STAI: CPT

## 2023-08-21 PROCEDURE — 88173 CYTOPATH EVAL FNA REPORT: CPT

## 2023-08-21 PROCEDURE — 71045 X-RAY EXAM CHEST 1 VIEW: CPT

## 2023-08-21 PROCEDURE — 3609010800 HC BRONCHOSCOPY ALVEOLAR LAVAGE: Performed by: INTERNAL MEDICINE

## 2023-08-21 PROCEDURE — 6360000002 HC RX W HCPCS: Performed by: NURSE ANESTHETIST, CERTIFIED REGISTERED

## 2023-08-21 PROCEDURE — 3603165200 HC BRNCHSC EBUS GUIDED SAMPL 1/2 NODE STATION/STRUX: Performed by: INTERNAL MEDICINE

## 2023-08-21 PROCEDURE — 87070 CULTURE OTHR SPECIMN AEROBIC: CPT

## 2023-08-21 PROCEDURE — 3609011100 HC BRONCHOSCOPY BRUSHINGS: Performed by: INTERNAL MEDICINE

## 2023-08-21 PROCEDURE — 2500000003 HC RX 250 WO HCPCS: Performed by: ANESTHESIOLOGY

## 2023-08-21 PROCEDURE — 31623 DX BRONCHOSCOPE/BRUSH: CPT | Performed by: INTERNAL MEDICINE

## 2023-08-21 PROCEDURE — 80048 BASIC METABOLIC PNL TOTAL CA: CPT

## 2023-08-21 PROCEDURE — 7100000000 HC PACU RECOVERY - FIRST 15 MIN: Performed by: INTERNAL MEDICINE

## 2023-08-21 PROCEDURE — 31654 BRONCH EBUS IVNTJ PERPH LES: CPT | Performed by: INTERNAL MEDICINE

## 2023-08-21 PROCEDURE — 2720000010 HC SURG SUPPLY STERILE: Performed by: INTERNAL MEDICINE

## 2023-08-21 PROCEDURE — 2580000003 HC RX 258: Performed by: ANESTHESIOLOGY

## 2023-08-21 PROCEDURE — 88112 CYTOPATH CELL ENHANCE TECH: CPT

## 2023-08-21 PROCEDURE — 3609011200 HC BRONCHOSCOPY W/TRANSBRONCL NDL ASPIR BX EA ADDL LOBE: Performed by: INTERNAL MEDICINE

## 2023-08-21 PROCEDURE — 7100000001 HC PACU RECOVERY - ADDTL 15 MIN: Performed by: INTERNAL MEDICINE

## 2023-08-21 PROCEDURE — 6360000002 HC RX W HCPCS

## 2023-08-21 PROCEDURE — 88104 CYTOPATH FL NONGYN SMEARS: CPT

## 2023-08-21 PROCEDURE — 76000 FLUOROSCOPY <1 HR PHYS/QHP: CPT

## 2023-08-21 PROCEDURE — 3700000000 HC ANESTHESIA ATTENDED CARE: Performed by: INTERNAL MEDICINE

## 2023-08-21 PROCEDURE — 31624 DX BRONCHOSCOPE/LAVAGE: CPT | Performed by: INTERNAL MEDICINE

## 2023-08-21 PROCEDURE — 7100000010 HC PHASE II RECOVERY - FIRST 15 MIN: Performed by: INTERNAL MEDICINE

## 2023-08-21 PROCEDURE — 87116 MYCOBACTERIA CULTURE: CPT

## 2023-08-21 PROCEDURE — 3700000001 HC ADD 15 MINUTES (ANESTHESIA): Performed by: INTERNAL MEDICINE

## 2023-08-21 PROCEDURE — 31627 NAVIGATIONAL BRONCHOSCOPY: CPT | Performed by: INTERNAL MEDICINE

## 2023-08-21 PROCEDURE — 7100000011 HC PHASE II RECOVERY - ADDTL 15 MIN: Performed by: INTERNAL MEDICINE

## 2023-08-21 PROCEDURE — 85027 COMPLETE CBC AUTOMATED: CPT

## 2023-08-21 PROCEDURE — 36415 COLL VENOUS BLD VENIPUNCTURE: CPT

## 2023-08-21 RX ORDER — PROPOFOL 10 MG/ML
INJECTION, EMULSION INTRAVENOUS PRN
Status: DISCONTINUED | OUTPATIENT
Start: 2023-08-21 | End: 2023-08-21 | Stop reason: SDUPTHER

## 2023-08-21 RX ORDER — SODIUM CHLORIDE 0.9 % (FLUSH) 0.9 %
5-40 SYRINGE (ML) INJECTION PRN
Status: DISCONTINUED | OUTPATIENT
Start: 2023-08-21 | End: 2023-08-21 | Stop reason: HOSPADM

## 2023-08-21 RX ORDER — SODIUM CHLORIDE 0.9 % (FLUSH) 0.9 %
5-40 SYRINGE (ML) INJECTION EVERY 12 HOURS SCHEDULED
Status: DISCONTINUED | OUTPATIENT
Start: 2023-08-21 | End: 2023-08-21 | Stop reason: HOSPADM

## 2023-08-21 RX ORDER — SODIUM CHLORIDE 9 MG/ML
INJECTION, SOLUTION INTRAVENOUS PRN
Status: DISCONTINUED | OUTPATIENT
Start: 2023-08-21 | End: 2023-08-21 | Stop reason: HOSPADM

## 2023-08-21 RX ORDER — DEXAMETHASONE SODIUM PHOSPHATE 4 MG/ML
INJECTION, SOLUTION INTRA-ARTICULAR; INTRALESIONAL; INTRAMUSCULAR; INTRAVENOUS; SOFT TISSUE PRN
Status: DISCONTINUED | OUTPATIENT
Start: 2023-08-21 | End: 2023-08-21 | Stop reason: SDUPTHER

## 2023-08-21 RX ORDER — LIDOCAINE HYDROCHLORIDE 20 MG/ML
INJECTION, SOLUTION INFILTRATION; PERINEURAL PRN
Status: DISCONTINUED | OUTPATIENT
Start: 2023-08-21 | End: 2023-08-21 | Stop reason: SDUPTHER

## 2023-08-21 RX ORDER — MIDAZOLAM HYDROCHLORIDE 1 MG/ML
1 INJECTION INTRAMUSCULAR; INTRAVENOUS EVERY 5 MIN PRN
Status: DISCONTINUED | OUTPATIENT
Start: 2023-08-21 | End: 2023-08-21 | Stop reason: HOSPADM

## 2023-08-21 RX ORDER — SODIUM CHLORIDE, SODIUM LACTATE, POTASSIUM CHLORIDE, CALCIUM CHLORIDE 600; 310; 30; 20 MG/100ML; MG/100ML; MG/100ML; MG/100ML
INJECTION, SOLUTION INTRAVENOUS CONTINUOUS
Status: DISCONTINUED | OUTPATIENT
Start: 2023-08-21 | End: 2023-08-21 | Stop reason: HOSPADM

## 2023-08-21 RX ORDER — OXYCODONE HYDROCHLORIDE 5 MG/1
5 TABLET ORAL
Status: DISCONTINUED | OUTPATIENT
Start: 2023-08-21 | End: 2023-08-21 | Stop reason: HOSPADM

## 2023-08-21 RX ORDER — DIPHENHYDRAMINE HYDROCHLORIDE 50 MG/ML
12.5 INJECTION INTRAMUSCULAR; INTRAVENOUS
Status: DISCONTINUED | OUTPATIENT
Start: 2023-08-21 | End: 2023-08-21 | Stop reason: HOSPADM

## 2023-08-21 RX ORDER — HYDRALAZINE HYDROCHLORIDE 20 MG/ML
5 INJECTION INTRAMUSCULAR; INTRAVENOUS
Status: DISCONTINUED | OUTPATIENT
Start: 2023-08-21 | End: 2023-08-21 | Stop reason: HOSPADM

## 2023-08-21 RX ORDER — ROCURONIUM BROMIDE 10 MG/ML
INJECTION, SOLUTION INTRAVENOUS PRN
Status: DISCONTINUED | OUTPATIENT
Start: 2023-08-21 | End: 2023-08-21 | Stop reason: SDUPTHER

## 2023-08-21 RX ORDER — ONDANSETRON 2 MG/ML
4 INJECTION INTRAMUSCULAR; INTRAVENOUS EVERY 10 MIN PRN
Status: DISCONTINUED | OUTPATIENT
Start: 2023-08-21 | End: 2023-08-21 | Stop reason: HOSPADM

## 2023-08-21 RX ORDER — FAMOTIDINE 10 MG/ML
20 INJECTION, SOLUTION INTRAVENOUS ONCE
Status: COMPLETED | OUTPATIENT
Start: 2023-08-21 | End: 2023-08-21

## 2023-08-21 RX ORDER — ONDANSETRON 2 MG/ML
INJECTION INTRAMUSCULAR; INTRAVENOUS PRN
Status: DISCONTINUED | OUTPATIENT
Start: 2023-08-21 | End: 2023-08-21 | Stop reason: SDUPTHER

## 2023-08-21 RX ADMIN — ROCURONIUM BROMIDE 10 MG: 10 INJECTION, SOLUTION INTRAVENOUS at 11:50

## 2023-08-21 RX ADMIN — FAMOTIDINE 20 MG: 10 INJECTION INTRAVENOUS at 09:28

## 2023-08-21 RX ADMIN — SODIUM CHLORIDE, POTASSIUM CHLORIDE, SODIUM LACTATE AND CALCIUM CHLORIDE: 600; 310; 30; 20 INJECTION, SOLUTION INTRAVENOUS at 12:24

## 2023-08-21 RX ADMIN — DEXAMETHASONE SODIUM PHOSPHATE 8 MG: 4 INJECTION INTRA-ARTICULAR; INTRALESIONAL; INTRAMUSCULAR; INTRAVENOUS; SOFT TISSUE at 12:10

## 2023-08-21 RX ADMIN — ONDANSETRON 4 MG: 2 INJECTION INTRAMUSCULAR; INTRAVENOUS at 12:10

## 2023-08-21 RX ADMIN — LIDOCAINE HYDROCHLORIDE 50 MG: 20 INJECTION, SOLUTION INFILTRATION; PERINEURAL at 10:13

## 2023-08-21 RX ADMIN — PROPOFOL 120 MG: 10 INJECTION, EMULSION INTRAVENOUS at 10:13

## 2023-08-21 RX ADMIN — SODIUM CHLORIDE, POTASSIUM CHLORIDE, SODIUM LACTATE AND CALCIUM CHLORIDE: 600; 310; 30; 20 INJECTION, SOLUTION INTRAVENOUS at 10:08

## 2023-08-21 RX ADMIN — PROPOFOL 50 MG: 10 INJECTION, EMULSION INTRAVENOUS at 11:13

## 2023-08-21 RX ADMIN — ROCURONIUM BROMIDE 40 MG: 10 INJECTION, SOLUTION INTRAVENOUS at 10:13

## 2023-08-21 RX ADMIN — SUGAMMADEX 200 MG: 100 INJECTION, SOLUTION INTRAVENOUS at 12:18

## 2023-08-21 ASSESSMENT — PAIN - FUNCTIONAL ASSESSMENT: PAIN_FUNCTIONAL_ASSESSMENT: NONE - DENIES PAIN

## 2023-08-21 NOTE — DISCHARGE INSTRUCTIONS
Please follow up with pulmonologist as needed. The pulmonology office will call regarding any biopsy result. Please call the office with any additional questions, [phone]           Bronchoscopy: What to Expect at [address]     Bronchoscopy lets your doctor look at your airway through a tube called a bronchoscope. Afterward, you may feel tired for 1 or 2 days. Your mouth may feel very dry for several hours after the procedure. You may also have a sore throat and a hoarse voice for a few days. Sucking on throat lozenges or gargling with warm salt water may help soothe your sore throat. If a sample of tissue (biopsy) was taken, you may spit up a small amount of blood or have bloody saliva. This is normal.  Do not drive or smoke for 24 hours. This care sheet gives you a general idea about how long it will take for you to recover. But each person recovers at a different pace. Follow the steps below to get better as quickly as possible. How can you care for yourself at home? Activity    Do not eat anything for 2 hours after the procedure. Rest when you feel tired. Getting enough sleep will help you recover. Avoid strenuous activities, such as bicycle riding, jogging, weight lifting, or aerobic exercise, until your doctor says it is okay. No driving for 32ETL. Diet    You can eat your normal diet. If your stomach is upset, try bland, low-fat foods like plain rice, broiled chicken, toast, and yogurt. If it is painful to swallow, start out with cold drinks, flavored ice pops, and ice cream. Next, try soft foods like pudding, yogurt, canned or cooked fruit, scrambled eggs, and mashed potatoes. Avoid eating hard or scratchy foods like chips or raw vegetables. Avoid orange or tomato juice and other acidic foods that can sting the throat. Drink plenty of fluids to avoid becoming dehydrated (unless your doctor tells you not to).    Medicines    Take pain medicines exactly as

## 2023-08-21 NOTE — H&P
Fiberoptic bronchoscopy history:     Patient with Pulmonary Nodule and lymphadenopathy; requires fiberoptic bronchoscopy with biopsy. Patient is allergic to atenolol and rocephin [ceftriaxone]. Past Medical History:   Diagnosis Date    Asthma     CAD (coronary artery disease)     Depression     Diabetes mellitus ([address])     HTN     Hyperglycemia     Hyperlipidemia        Past Surgical History:   Procedure Laterality Date    BREAST BIOPSY      CORONARY ANGIOPLASTY WITH STENT PLACEMENT  03/07/2014    Xpedition CHARAN 2.25 x 8 mCX    CORONARY ANGIOPLASTY WITH STENT PLACEMENT  03/26/2014    Xpedition CHARAN 3.5 x 28 rPROX    CORONARY ANGIOPLASTY WITH STENT PLACEMENT  12/04/2017    Xience CHARAN 2.5 x 18 CHARAN mLAD       Allergies   Allergen Reactions    Atenolol Other (See Comments)     Disoriented     Rocephin [Ceftriaxone] Hives       No current facility-administered medications on file prior to encounter.      Current Outpatient Medications on File Prior to Encounter   Medication Sig Dispense Refill    predniSONE (DELTASONE) 10 MG tablet 3 tabs daily for 5 days 15 tablet 0    TRELEGY ELLIPTA 100-62.5-25 MCG/ACT AEPB inhaler Inhale 1 puff by mouth once daily 60 each 1    metoprolol succinate (TOPROL XL) 25 MG extended release tablet Take 1 tablet by mouth once daily 90 tablet 0    glipiZIDE (GLUCOTROL XL) 2.5 MG extended release tablet Take 1 tablet by mouth daily 30 tablet 3    furosemide (LASIX) 20 MG tablet Take 1 tablet by mouth daily 90 tablet 3    albuterol sulfate HFA (PROVENTIL;VENTOLIN;PROAIR) 108 (90 Base) MCG/ACT inhaler INHALE 2 PUFFS BY MOUTH EVERY 6 HOURS AS NEEDED FOR WHEEZING FOR SHORTNESS OF BREATH 9 g 5    guaiFENesin (MUCINEX) 600 MG extended release tablet TAKE 1 TABLET BY MOUTH TWICE DAILY FOR 15 DAYS 30 tablet 0    clopidogrel (PLAVIX) 75 MG tablet Take 1 tablet by mouth daily 90 tablet 3    rosuvastatin (CRESTOR) 20 MG tablet Take 1 tablet by mouth daily 90 tablet 3    albuterol (PROVENTIL) (2.5 MG/3ML)

## 2023-08-21 NOTE — PROCEDURES
PROCEDURE: Fiberoptic bronchoscopy with robotic bronchoscopy-guided transbronchial needle aspiration, transbronchial brushings, transbronchial biopsies and BAL & endobronchial ultrasound-guided biopsy of lymph nodes. DESCRIPTION OF PROCEDURE: Informed consent was obtained from the patient after explaining the risks and benefits. A time out was taken. Anesthesia was kindly provided by the anesthesia team.     The bronchoscope was passed with ease via size 8.5 ET tube. All airways were well visualized. There was no endobronchial lesion. There were scant secretions in the airways. All airway secretions were removed by suctioning prior to robotic assisted portion of procedure. The [employer] robotic bronchoscope was next inserted into the trachea using standard procedure. The robot was guided along the designed pathway from pre-procedural CT. Once the target lesion in the RLL was reached, the presence of the tissue to be biopsied was confirmed using radial probe EBUS. An eccentric lesion was visualized with radial probe EBUS. While utilizing live fluoroscopic imaging, the following samples were obtained from the target lesion:  Transbronchial needle aspiration    Transbronchial brushings  RLL BAL    Subsequently, the robotic bronchoscope was removed & the bronchoscope was exchanged to a linear probe EBUS bronchoscope. Direct visualization of the lymph nodes was obtained using endobronchial ultrasound (EBUS) guidance. An ultrasound lymph node exam was performed for lymph node stations 4, 7, 10 and 11. The following lymph nodes were subjected to EBUS guided biopsy using standard technique in the following order:  4L  (approximately 0.75 cm in short axis)   7 (approximately 1 cm in short axis)    4R (approximately 0.75 cm in short axis)   11R (approximately 1.5 cm in short axis)      The patient tolerated the procedure well. Estimated blood loss was less than 5 ml.    Recovery will be per the anesthesia team.

## 2023-08-21 NOTE — ANESTHESIA PRE PROCEDURE
Department of Anesthesiology  Preprocedure Note       Name:  Isauro Martin   Age:  63 y.o.  :  1960                                          MRN:  [id number]         Date:  2023      Surgeon: Kimi Valentin):  Caren Leblanc MD    Procedure: Procedure(s):  ROBOT WF W/ANES. (10:00) NO LABS  EBUS WF W/ANES. (AB AWARE 23 @ 14:13)-[facility] to be here for needle    Medications prior to admission:   Prior to Admission medications    Medication Sig Start Date End Date Taking?  Authorizing Provider   predniSONE (DELTASONE) 10 MG tablet 3 tabs daily for 5 days 23   MD Damaris Power Yemi ELLIPTA 100-62.5-25 MCG/ACT AEPB inhaler Inhale 1 puff by mouth once daily 23   Travis Bullard MD   metoprolol succinate (TOPROL XL) 25 MG extended release tablet Take 1 tablet by mouth once daily 23   MARY Choi CNP   glipiZIDE (GLUCOTROL XL) 2.5 MG extended release tablet Take 1 tablet by mouth daily 23   Travis Bullard MD   furosemide (LASIX) 20 MG tablet Take 1 tablet by mouth daily 23   Travis Bullard MD   albuterol sulfate HFA (PROVENTIL;VENTOLIN;PROAIR) 108 (90 Base) MCG/ACT inhaler INHALE 2 PUFFS BY MOUTH EVERY 6 HOURS AS NEEDED FOR WHEEZING FOR SHORTNESS OF BREATH 23   MD Marian Junior [facility]) 600 MG extended release tablet TAKE 1 TABLET BY MOUTH TWICE DAILY FOR 15 DAYS 23   Travis Bullard MD   clopidogrel (PLAVIX) 75 MG tablet Take 1 tablet by mouth daily 11/15/22   MARY Choi CNP   rosuvastatin (CRESTOR) 20 MG tablet Take 1 tablet by mouth daily 11/15/22   MARY Choi CNP   albuterol (PROVENTIL) (2.5 MG/3ML) 0.083% nebulizer solution Take 3 mLs by nebulization every 6 hours as needed for Wheezing or Shortness of Breath 5/10/22   Travis Bullard MD   betamethasone valerate (VALISONE) 0.1 % cream APPLY TOPICALLY TWO TIMES A DAY AS NEEDED 10/12/21   Travis Bullard MD   nitroGLYCERIN (NITROSTAT) 0.4 MG SL tablet

## 2023-08-22 LAB
ACID FAST STN SPEC QL: NORMAL
LOEFFLER MB STN SPEC: NORMAL
LOEFFLER MB STN SPEC: NORMAL

## 2023-08-23 DIAGNOSIS — I49.3 PREMATURE VENTRICULAR CONTRACTIONS: ICD-10-CM

## 2023-08-23 DIAGNOSIS — I10 ESSENTIAL HYPERTENSION: ICD-10-CM

## 2023-08-23 LAB
BACTERIA SPEC RESP CULT: NORMAL
BACTERIA SPEC RESP CULT: NORMAL
GRAM STN SPEC: NORMAL
GRAM STN SPEC: NORMAL

## 2023-08-23 RX ORDER — METOPROLOL SUCCINATE 25 MG/1
TABLET, EXTENDED RELEASE ORAL
Qty: 90 TABLET | Refills: 0 | Status: SHIPPED | OUTPATIENT
Start: 2023-08-23

## 2023-08-23 RX ORDER — LISINOPRIL 5 MG/1
TABLET ORAL
Qty: 90 TABLET | Refills: 0 | OUTPATIENT
Start: 2023-08-23

## 2023-08-23 NOTE — TELEPHONE ENCOUNTER
LOV 02/13/2023  Upcoming appt none  BMP & CBC 08/21/2023    Lisinopril d/c'd but I cannot selectively refuse medications. Pended.

## 2023-08-29 NOTE — TELEPHONE ENCOUNTER
Per Dr. Bren Ca, additional testing has been requested. Cancelled today's visit. Rescheduled pt to 9/6/23.

## 2023-09-01 RX ORDER — GLIPIZIDE 2.5 MG/1
TABLET, EXTENDED RELEASE ORAL
Qty: 30 TABLET | Refills: 2 | Status: SHIPPED | OUTPATIENT
Start: 2023-09-01

## 2023-09-04 LAB
FUNGUS SPEC CULT: NORMAL
FUNGUS SPEC CULT: NORMAL
LOEFFLER MB STN SPEC: NORMAL
LOEFFLER MB STN SPEC: NORMAL

## 2023-09-05 ENCOUNTER — TELEPHONE (OUTPATIENT)
Dept: PULMONOLOGY | Age: 63
End: 2023-09-05

## 2023-09-05 LAB
ACID FAST STN SPEC QL: NORMAL
MYCOBACTERIUM SPEC CULT: NORMAL

## 2023-09-05 NOTE — TELEPHONE ENCOUNTER
Patient cancelled appointment on 9/6/23 with Dr. Tessie Ray for Bronch Follow Up. Reason: Pt stated Dr. Tessie Ray said there was not reason to come in for this appt and that she was told the appt was cancelled. Patient did not reschedule appointment. Appointment rescheduled for NA.      Last OV 8/9/23   ASSESSMENT:  Pulmonary Nodule RLL 1.8X1.8, new vs April 2022 imaging   Stable adrenal nodule, favor adenoma  Chronic hypoxemic respiratory failure, known need for oxygen, she has not been able to afford copays  Pulmonary emphysema/COPD  CAD s/p PCI 2014 and 2017  Ongoing tobacco abuse, > 40 pack year history      PLAN:  [place] protocol CT & schedule monarch/ENB   Trelegy 100 ($0 copay), PRN albuterol  Hold plavix for 5 days prior to procedure, ASA two days prior to procedure, please confirm okay with Dr. Francy Shabazz - has appt with Dr. Francy Shabazz on 8/21/23   See if we are able to get home oxygen set up please, copays have been an issue  Tobacco cessation is recommended

## 2023-09-12 ENCOUNTER — TELEPHONE (OUTPATIENT)
Dept: FAMILY MEDICINE CLINIC | Age: 63
End: 2023-09-12

## 2023-09-12 LAB
ACID FAST STN SPEC QL: NORMAL
MYCOBACTERIUM SPEC CULT: NORMAL

## 2023-09-12 NOTE — TELEPHONE ENCOUNTER
Per Dr. Siria Moyer result note on cytology:      Ray Dickey MD   9/1/2023  4:00 PM EDT Back to Top      I  reached the patient and discussed today. I explained the pathology findings. I explained that it had been sent out for additional review and the typical features of cancer were not seen. I explained that cancer was still possible, that the biopsy may have been a false negative. Using shared decision making, we decided to repeat CT imaging at a close interval.  She does not wish to come into the office to discuss this further with me, she is happy with our phone conversation and therefore the upcoming visit with me can be canceled. Please schedule a CT scan of the chest in 10 to 12 weeks, see me after. The patient is aware of the need for the CT scan. She knows that it needs to be scheduled.

## 2023-09-12 NOTE — TELEPHONE ENCOUNTER
----- Message from Nicole Levin sent at 9/11/2023  4:10 PM EDT -----  Subject: Appointment Request    Reason for Call: Established Patient Appointment needed: Routine Physical   Exam    QUESTIONS    Reason for appointment request? No appointments available during search     Additional Information for Provider? Patient is calling in and she would   like to have a morning appointment on any day for a physical and she is   needing to have her paperwork updated for [place] as well. Please call her   back to schedule as there was nothing to schedule for the year.  Thank you.     ---------------------------------------------------------------------------  --------------  Siri Tillman INFO  [phone]; OK to leave message on voicemail  ---------------------------------------------------------------------------  --------------  SCRIPT ANSWERS

## 2023-09-12 NOTE — TELEPHONE ENCOUNTER
Pt had cancelled follow up on 9/6/23 stating that she was told that she didn't need to keep the appt. No follow up currently scheduled. Please advise.

## 2023-09-12 NOTE — TELEPHONE ENCOUNTER
Dr Chau Enriquez,   Patient will need to have FMLA paperwork filled at again at end of Oct. She scheduled appt for her f/u but I could not get her in until end of Dec.  Will you fill out paperwork for her when it is due?

## 2023-09-13 NOTE — TELEPHONE ENCOUNTER
Patient called with message left for patient to call back to office to inform of scheduled appts and make sure pt can keep these.

## 2023-09-14 ENCOUNTER — TELEPHONE (OUTPATIENT)
Dept: PULMONOLOGY | Age: 63
End: 2023-09-14

## 2023-09-19 LAB
ACID FAST STN SPEC QL: NORMAL
MYCOBACTERIUM SPEC CULT: NORMAL

## 2023-09-22 NOTE — TELEPHONE ENCOUNTER
Jovan with [employer] called back stating that pt has a high deductible and is still deciding if she is going to pay the out of pocket cost for the POC. Amna Gutierrez is waiting to hear back from the pt.

## 2023-09-26 LAB
ACID FAST STN SPEC QL: NORMAL
MYCOBACTERIUM SPEC CULT: NORMAL

## 2023-09-29 NOTE — TELEPHONE ENCOUNTER
Called Pt and lmtrc to let me know if she has gotten back in touch with liliana from [email or web address].

## 2023-10-03 LAB
ACID FAST STN SPEC QL: NORMAL
MYCOBACTERIUM SPEC CULT: NORMAL

## 2023-10-09 NOTE — PROGRESS NOTES
..1.  Do not eat or drink anything after 12 midnight prior to surgery. This includes no water, chewing gum or mints, except for bowel prep complete per MD.  2.  Take the following pills with a small sip of water on the morning of surgery 08/21/2023.  3.  Aspirin, Ibuprofen, Advil, Naproxen, Vitamin E and other Anti-inflammatory products should be stopped for 5 days before surgery or as directed by your physician. 4.  Check with your doctor regarding stopping Plavix, Coumadin, Lovenox, Fragmin or other blood thinners. 5.  Do not smoke and do not drink alcoholic beverages 24 hours prior to surgery. This includes NA Beer. 6.  You may brush your teeth and gargle the morning of surgery. DO NOT SWALLOW WATER.  7.  You MUST make arrangements for a responsible adult to take you home after your surgery. You will not be allowed to leave alone or drive yourself home. It is strongly suggested someone stay with you the first 24 hours. Your surgery will be cancelled if you do not have a ride home. 8.  A parent/legal guardian must accompany a child scheduled for surgery and plan to stay at the hospital until the child is discharged. Please do not bring other children with you. 9.  Please wear simple, loose fitting clothing to the hospital.  Khadijah Calderon not bring valuables ( money, credit cards, checkbooks, etc.)  Do not wear any makeup (including no eye makeup) or nail polish on your fingers or toes. 10.  Do not wear any jewelry or piercing on the day of surgery. All body piercing jewelry must be removed. 11.  If you have dentures, they will be removed before going to the OR; we will provide you a container. If you wear contact lenses or glasses, they will be removed; please bring a case for them.   15.  Notify your Surgeon if you develop any illness between now and surgery time; cough, cold, fever, sore throat, nausea, vomiting, etc.  Please notify your surgeon if you experience dizziness, shortness of breath or blurred
Bedside report received from Stanton Pascual, [address]. Care of pt transferred at this time.
Discharge instructions explained in detail at bedside to both pt ans pts son Jose Lyman. Pt and son received copy of discharge instructions. Pt  and son deny having any questions about discharge.
IV x 1 removed per discharge hemostasis achieved, dressing applied, no complications noted. Patient denies further needs. Pt transported to private car via wheel chair. Vitals per doc flow, pt pippa Rodriguez Tinsley assumes responsibility.
Patient out from bronch, O2 82% on RA with patient coughing continuously. Patient placed on O2 at 2L per simple mask. Patient provided yanker for secretions. Will continue to monitor.
Phase I (PACU)  1. Patient is identified using name and the date of birth. 2.  The patient is free from signs and symptoms of chemical, electrical, laser, radiation, positioning, or transfer/transport injury. 3.  The patient receives appropriate medication(s), safely administered during the Perioperative period. 4.  The patient has wound/tissue perfusion consistent with or improved from baseline levels established preoperatively. 5.  The patient is at or returning to normothermia at the conclusion of the immediate postoperative period. 6.  The patient's fluid, electrolyte, and acid base balances are consistent with or improved from baseline levels established preoperatively. 7.  The patient's pulmonary function is consistent with or improved from baseline levels established preoperatively. 8.  The patient's cardiovascular status is consistent with or improved from baseline levels established preoperatively. 9.  The patient/caregiver participates in decisions affecting his or her Perioperative plan of care. 10. The patient's care is consistent with the individualized Perioperative plan of care. 11. The patient's right to privacy is maintained. 12. The patient is the recipient of competent and ethical care within legal standards of practice. 13.  The patient's value system, lifestyle, ethnicity, and culture are considered, respected, and incorporated in the Perioperative plan of care. 14.  The patient demonstrates and/or reports adequate pain control throughout the the Perioperative period. 15. The patient's neurological status is consistent with or improved from baseline levels established preoperatively. 16.  The patient/caregiver demonstrates knowledge of the expected responses to the operative or invasive procedure. 17.  Patient/Caregiver has reduced anxiety. Interventions- Familiarize with environment and equipment.   18.  Other:  19.Other:
Phase II:  1. Patient is identified using name and the date of birth. 2.  The patient is free from signs and symptoms of chemical, electrical, laser, radiation, positioning, or transfer/transport injury. 3.  The patient receives appropriate medication(s), safely administered during the Perioperative period. 4.  The patient has wound/tissue perfusion consistent with or improved from baseline levels established preoperatively. 5.  The patient is at or returning to normothermia at the conclusion of the immediate postoperative period. 6.  The patient's fluid, electrolyte, and acid base balances are consistent with or improved from baseline levels established preoperatively. 7.  The patient's pulmonary function is consistent with or improved from baseline levels established preoperatively. 8.  The patient's cardiovascular status is consistent with or improved from baseline levels established preoperatively. 9.  The patient/caregiver demonstrates knowledge of nutritional management related to the operative or other invasive procedure. 10. The patient/caregiver demonstrates knowledge of medication, pain, and wound management. 11. The patient participates in the rehabilitation process as applicable. 12.  The patient/caregiver participates in decisions affection his or her Perioperative plan of care. 13.  The patient's care is consistent with the individualized Perioperative plan of care. 14.  The patient's right to privacy is maintained. 15. The patient is the recipient of competent and ethical care within legal standards of practice. 16.  The patient's value system, lifestyle, ethnicity, and culture are considered, respected, and incorporated in the Perioperative plan of care and understands special services available. 17.  The patient demonstrates and/or reports adequate pain control throughout the the Perioperative period. 18.   The patient's neurological status is consistent with or improved from
Portable chest X-ray completed at this time.
Post Broch chest xray complete. Results are as followed. FINDINGS:  The lungs are clear. The cardiac silhouette is unremarkable. There are no  pleural effusions. There is no pneumothorax. IMPRESSION:  No pneumothorax post bronchoscopy.
Pre-Operative:  1. Patient/Caregiver identifies - states name and date of birth. 2.  The patient is free from signs and symptoms of injury. 3.  The patient receives appropriate medication(s), safely administered during the Perioperative period. 4.  The patients's fluid, electrolyte, and acid-base balances are established preoperatively. 5.  The patient's pulmonary function is established preoperatively. 6.  The patient's cardiovascular status is established preoperatively. 7.  The patient / caregiver demonstrates knowledge of nutritional management related to the operative or other invasive procedure. 8.  The patient/caregiver demonstrates knowledge of medication management. 9.  The patient/caregiver demonstrates knowledge of pain management. 10.  The patient/caregiver participates in decisions affection his or her Perioperative plan of care. 11. The patient's care is consistent with the individualized Perioperative plan of care. 12.  The patient's right to privacy is maintained. 13. The patient is the recipient of competent and ethical care within legal standards of practice. 14.  The patient's value system, lifestyle, ethnicity, and culture are considered, respected, and incorporated in the Perioperative plan of care and understands special services available. 15.  The patient demonstrates and/or reports adequate pain control throughout the the Perioperative period. 16.  The patient's neurological status is established preoperatively. 17.  The patient/caregiver demonstrates knowledge of the expected responses to the endoscopy procedure. 18.  Patient/Caregiver has reduced anxiety. Interventions- Familiarize with environment and equipment. 19. Patient/Caregiver verbalizes understanding of Phase II and/or Phase I process. 20.  Patient pain level is established preoperatively using age appropriate pain scale. 21.   The patient will move to fall risk upon sedation- during and through the recovery
Other

## 2023-10-10 LAB
ACID FAST STN SPEC QL: NORMAL
MYCOBACTERIUM SPEC CULT: NORMAL

## 2023-10-12 NOTE — TELEPHONE ENCOUNTER
Pt returned call stating that she talked with [employer] but even with all of the assistance they were going to offer towards her deductible and the cost if the machine she still couldn't afford it. Asked pt if she was able to get set up with home oxygen and she stated that she couldn't afford a home concentrator either. Pt states that after December she will lose her medical insurance and should be able to get on medicaid and hopes to be able to get oxygen then.

## 2023-10-20 ENCOUNTER — HOSPITAL ENCOUNTER (OUTPATIENT)
Dept: CT IMAGING | Age: 63
Discharge: HOME | End: 2023-10-20
Payer: COMMERCIAL

## 2023-10-20 DIAGNOSIS — R91.1 PULMONARY NODULE: ICD-10-CM

## 2023-10-20 PROCEDURE — 71250 CT THORAX DX C-: CPT

## 2023-10-20 NOTE — RESULT ENCOUNTER NOTE
Tell pt there are inflammatory changes on CT suggesting possible mild infection. I'd like her to take a Zpac. Sent to listed pharmacy, change pharmacy if needed. I will discuss the other findings on her CT when she sees me.

## 2023-10-24 ENCOUNTER — OFFICE VISIT (OUTPATIENT)
Dept: PULMONOLOGY | Age: 63
End: 2023-10-24
Payer: COMMERCIAL

## 2023-10-24 VITALS
BODY MASS INDEX: 32.76 KG/M2 | OXYGEN SATURATION: 91 % | SYSTOLIC BLOOD PRESSURE: 136 MMHG | HEART RATE: 98 BPM | WEIGHT: 178 LBS | DIASTOLIC BLOOD PRESSURE: 86 MMHG | RESPIRATION RATE: 20 BRPM | HEIGHT: 62 IN

## 2023-10-24 DIAGNOSIS — R91.1 PULMONARY NODULE: Primary | ICD-10-CM

## 2023-10-24 PROCEDURE — 3075F SYST BP GE 130 - 139MM HG: CPT | Performed by: INTERNAL MEDICINE

## 2023-10-24 PROCEDURE — 99214 OFFICE O/P EST MOD 30 MIN: CPT | Performed by: INTERNAL MEDICINE

## 2023-10-24 PROCEDURE — 3079F DIAST BP 80-89 MM HG: CPT | Performed by: INTERNAL MEDICINE

## 2023-10-24 RX ORDER — FLUTICASONE FUROATE, UMECLIDINIUM BROMIDE AND VILANTEROL TRIFENATATE 100; 62.5; 25 UG/1; UG/1; UG/1
1 POWDER RESPIRATORY (INHALATION) DAILY
Qty: 1 EACH | Refills: 0 | Status: SHIPPED | COMMUNITY
Start: 2023-10-24

## 2023-10-24 NOTE — PROGRESS NOTES
CC/REFERRING PROVIDER: Patient is being seen at the request of Dr. Dr. Ridge Mesa for a consultation for Pulmonary Nodule     Interval History 10/24/23  - had monarch biopsy with EBUS 23, working on tobacco cessation     Interval History 23  - had CT PET, concerned about potential chemo as her spouse and sister both  during/shortly after chemo. PRESENTING HPI: 64 yo with h/o asthma and CAD and tobacco abuse resulted in LD screening CT CHEST @ [facility] [place] on 23 that showed new RLL 1.8 cm Pulmonary Nodule; patient is here for evaluation and treatment of the same. Strong family h/o lung cancer; spouse  of stage IV lung cancer. Has cut down from 1.5 ppd to 1/2 ppd but precontemplative on quitting       reports that she has been smoking cigarettes. She has a 16.50 pack-year smoking history. She has never used smokeless tobacco.      PHYSICAL EXAM:  Blood pressure 136/86, pulse 98, resp. rate 20, height 1.575 m (5' 2\"), weight 80.7 kg (178 lb), SpO2 91 %, not currently breastfeeding.'  Constitutional:  No acute distress. HENT:  Oropharynx is clear and moist.   Neck: No tracheal deviation present. Cardiovascular: Normal heart sounds. No lower extremity edema. Pulmonary/Chest: No wheezes. No rhonchi. No rales. + decreased breath sounds. No accessory muscle usage or stridor. Musculoskeletal: No cyanosis. No clubbing. Skin: Skin is warm and dry. Psychiatric: Normal mood and affect. Neurologic: speech fluent, alert and oriented, strength symmetric      DATA:  - PFT-   23 FEV1 0.84 L 36% TLC 7.38 L 155% % DLCO 8.49 45%    6MWT 87% RA, req 4 L 960 feet     - Imaging -   CT CHEST 10/20/23  Mediastinum: Coronary artery calcifications are a marker of atherosclerosis. No change in the mediastinal and likely right hilar adenopathy concerning for  santy metastasis. The lack of intravenous contrast limits evaluation of the  pee.    Lungs/pleura: Mucous plugging is present within the right

## 2023-10-24 NOTE — PATIENT INSTRUCTIONS
CT biopsy will be scheduled with you from the radiology dept directly. You should hold Plavix 5 days prior to procedure. Nothing to eat or drink after midnight prior to procedure. Once we know when the procedure date will be, you will be scheduled for follow up with Dr. Tessie aRy within 3-5 days after.
patient

## 2023-11-27 RX ORDER — GLIPIZIDE 2.5 MG/1
TABLET, EXTENDED RELEASE ORAL
Qty: 30 TABLET | Refills: 2 | Status: SHIPPED | OUTPATIENT
Start: 2023-11-27

## 2023-11-27 RX ORDER — FLUTICASONE FUROATE, UMECLIDINIUM BROMIDE AND VILANTEROL TRIFENATATE 100; 62.5; 25 UG/1; UG/1; UG/1
POWDER RESPIRATORY (INHALATION)
Qty: 60 EACH | Refills: 1 | Status: SHIPPED | OUTPATIENT
Start: 2023-11-27

## 2023-12-04 ENCOUNTER — HOSPITAL ENCOUNTER (OUTPATIENT)
Dept: CT IMAGING | Age: 63
Discharge: HOME | End: 2023-12-04
Attending: INTERNAL MEDICINE
Payer: COMMERCIAL

## 2023-12-04 ENCOUNTER — HOSPITAL ENCOUNTER (OUTPATIENT)
Age: 63
End: 2023-12-04
Attending: INTERNAL MEDICINE
Payer: COMMERCIAL

## 2023-12-04 ENCOUNTER — HOSPITAL ENCOUNTER (OUTPATIENT)
Dept: GENERAL RADIOLOGY | Age: 63
Discharge: HOME | End: 2023-12-04
Attending: INTERNAL MEDICINE
Payer: COMMERCIAL

## 2023-12-04 VITALS
BODY MASS INDEX: 31.28 KG/M2 | OXYGEN SATURATION: 90 % | TEMPERATURE: 97.3 F | SYSTOLIC BLOOD PRESSURE: 147 MMHG | HEIGHT: 62 IN | RESPIRATION RATE: 18 BRPM | DIASTOLIC BLOOD PRESSURE: 93 MMHG | WEIGHT: 170 LBS | HEART RATE: 90 BPM

## 2023-12-04 DIAGNOSIS — R91.1 PULMONARY NODULE: ICD-10-CM

## 2023-12-04 DIAGNOSIS — Z98.890 STATUS POST BIOPSY: ICD-10-CM

## 2023-12-04 DIAGNOSIS — J95.811 PNEUMOTHORAX, POST BIOPSY: ICD-10-CM

## 2023-12-04 LAB
DEPRECATED RDW RBC AUTO: 15.4 % (ref 12.4–15.4)
GLUCOSE BLD-MCNC: 134 MG/DL (ref 70–99)
HCT VFR BLD AUTO: 50.5 % (ref 36–48)
HGB BLD-MCNC: 16.8 G/DL (ref 12–16)
INR PPP: 0.94 (ref 0.84–1.16)
MCH RBC QN AUTO: 30.1 PG (ref 26–34)
MCHC RBC AUTO-ENTMCNC: 33.3 G/DL (ref 31–36)
MCV RBC AUTO: 90.3 FL (ref 80–100)
PERFORMED ON: ABNORMAL
PLATELET # BLD AUTO: 329 K/UL (ref 135–450)
PMV BLD AUTO: 7.7 FL (ref 5–10.5)
PROTHROMBIN TIME: 12.6 SEC (ref 11.5–14.8)
RBC # BLD AUTO: 5.59 M/UL (ref 4–5.2)
WBC # BLD AUTO: 7.9 K/UL (ref 4–11)

## 2023-12-04 PROCEDURE — 7100000010 HC PHASE II RECOVERY - FIRST 15 MIN

## 2023-12-04 PROCEDURE — 71045 X-RAY EXAM CHEST 1 VIEW: CPT

## 2023-12-04 PROCEDURE — 85027 COMPLETE CBC AUTOMATED: CPT

## 2023-12-04 PROCEDURE — 85610 PROTHROMBIN TIME: CPT

## 2023-12-04 PROCEDURE — 88305 TISSUE EXAM BY PATHOLOGIST: CPT

## 2023-12-04 PROCEDURE — 36415 COLL VENOUS BLD VENIPUNCTURE: CPT

## 2023-12-04 PROCEDURE — 32408 CORE NDL BX LNG/MED PERQ: CPT

## 2023-12-04 PROCEDURE — 6360000002 HC RX W HCPCS: Performed by: RADIOLOGY

## 2023-12-04 PROCEDURE — 88312 SPECIAL STAINS GROUP 1: CPT

## 2023-12-04 PROCEDURE — 7100000011 HC PHASE II RECOVERY - ADDTL 15 MIN

## 2023-12-04 RX ORDER — SODIUM CHLORIDE 0.9 % (FLUSH) 0.9 %
5-40 SYRINGE (ML) INJECTION PRN
Status: DISCONTINUED | OUTPATIENT
Start: 2023-12-04 | End: 2023-12-05 | Stop reason: HOSPADM

## 2023-12-04 RX ORDER — FENTANYL CITRATE 50 UG/ML
INJECTION, SOLUTION INTRAMUSCULAR; INTRAVENOUS PRN
Status: COMPLETED | OUTPATIENT
Start: 2023-12-04 | End: 2023-12-04

## 2023-12-04 RX ORDER — SODIUM CHLORIDE 0.9 % (FLUSH) 0.9 %
5-40 SYRINGE (ML) INJECTION EVERY 12 HOURS SCHEDULED
Status: DISCONTINUED | OUTPATIENT
Start: 2023-12-04 | End: 2023-12-05 | Stop reason: HOSPADM

## 2023-12-04 RX ORDER — SODIUM CHLORIDE 9 MG/ML
INJECTION, SOLUTION INTRAVENOUS PRN
Status: DISCONTINUED | OUTPATIENT
Start: 2023-12-04 | End: 2023-12-05 | Stop reason: HOSPADM

## 2023-12-04 RX ORDER — MIDAZOLAM HYDROCHLORIDE 1 MG/ML
INJECTION INTRAMUSCULAR; INTRAVENOUS PRN
Status: COMPLETED | OUTPATIENT
Start: 2023-12-04 | End: 2023-12-04

## 2023-12-04 RX ADMIN — MIDAZOLAM 1 MG: 1 INJECTION INTRAMUSCULAR; INTRAVENOUS at 09:10

## 2023-12-04 RX ADMIN — FENTANYL CITRATE 50 MCG: 50 INJECTION INTRAMUSCULAR; INTRAVENOUS at 09:09

## 2023-12-04 ASSESSMENT — PAIN - FUNCTIONAL ASSESSMENT: PAIN_FUNCTIONAL_ASSESSMENT: NONE - DENIES PAIN

## 2023-12-04 NOTE — BRIEF OP NOTE
Brief Postoperative Note    Luis Motta  YOB: 1960  [id number]    Pre-operative Diagnosis: Right lung nodule    Post-operative Diagnosis: Same    Procedure: CT guided right lung nodule biopsy    Anesthesia: Moderate Sedation    Surgeons: August Alves MD    Estimated Blood Loss: <5 mL    Complications: None    Specimens: Was Obtained: 5 core samples    Findings: As above    Electronically signed by Nithin Richardson MD on 12/4/2023 at 9:40 AM

## 2023-12-04 NOTE — OR NURSING
Fifth specimen placed in formalin. Blood patch injected thorough biopsy device. Sterile dressing applied.

## 2023-12-04 NOTE — PROGRESS NOTES
Pt arrived for image guided right lung biopsy. Procedure explained including the risk and benefits of the procedure. All questions answered. Pt verbalizes understanding of the procedure and states no more questions. Consent confirmed. Vital signs stable. Labs, allergies, medications, and code status reviewed. No contraindications noted.      Vital Signs  Vitals:    12/04/23 0916   BP: (!) 154/86   Pulse: (!) 102   Resp: 14   Temp:    SpO2: 95%    (vital signs in table format)    Pre June Score  2 - Able to move 4 extremities voluntarily on command  2 - BP+/- 20mmHg of normal  2 - Fully awake  2 - Able to maintain oxygen saturation >92% on room air  2 - Able to breathe deeply and cough freely    Allergies  Atenolol and Rocephin [ceftriaxone] (allergies)    Labs  Lab Results   Component Value Date    INR 0.94 12/04/2023    PROTIME 12.6 12/04/2023     Lab Results   Component Value Date    CREATININE 0.8 08/21/2023    BUN 12 08/21/2023     08/21/2023    GFR 81 11/19/2012    K 4.5 08/21/2023     08/21/2023    CO2 25 08/21/2023     Lab Results   Component Value Date    WBC 7.9 12/04/2023    HGB 16.8 (H) 12/04/2023    HCT 50.5 (H) 12/04/2023    MCV 90.3 12/04/2023     12/04/2023

## 2023-12-04 NOTE — PROGRESS NOTES
sized mild anteroseptal completely reversible defect consistent with   ischemia in the territory of the mid and distal LAD. Hyperdynamic LV   systolic function with LT>48% with uniform wall motion. Low-intermediate   risk abnormal study. CATH 3/25/2014  1. Left main coronary artery was small, but had no angiographic evidence of   disease, bifurcating into the LAD and left circumflex. 2. The LAD had a 70% lesion noted within the mid LAD just after a septal   branch. This was negative on the stress test with no evidence of ischemia   whatsoever and therefore left alone at this time. 3. The left circumflex was short. It had a large OM 1 branch that acted   more like a ramus that had very mild disease throughout its course. Just   after this, there was a very short focal segment of 70% to 75% stenosis in   the mid circumflex. This was the lesion that was intervened upon at this   time. 4. The right coronary artery was noted to be dominant. It had a patent   stent that was present in the mid RCA with no evidence of complication. ASSESSMENT: Successful percutaneous intervention to the mid left circumflex   using Xience Xpedition 2.25-mm x 8-mm. The 75% stenosis was reduced to 0%   with no complication and maintenance of ELIGIO 3 flow. CATH 3/7/2014  [facility] SUMMARY   ~LM normal   ~LAD Mid 80%   ~Cx Mid 70% focal   ~RCA Mid 100%, L->R collaterals   ~LVG 45% with inferior hk   Impression   ~Angiography w/ multivessel disease   ~LVEDP 18   ~LVG with depressed EF and inferior hypokinesis   Intervention   ~PCI of 100% RCA with [id number]   ~Staged PCI of Cx and LAD recommended      Limited Echo 12/30/14  Summary   Last echo on 3/8/14 showed EF 45-50% with inferior wall hypokinesis. Compared to previous echo, there has been some slight improvement in wall   motion. Normal left ventricle size, wall thickness and systolic function with an   estimated ejection fraction of 55-60%.  Mild inferolateral and apical   inferior

## 2023-12-05 ENCOUNTER — OFFICE VISIT (OUTPATIENT)
Dept: CARDIOLOGY CLINIC | Age: 63
End: 2023-12-05
Payer: COMMERCIAL

## 2023-12-05 VITALS
OXYGEN SATURATION: 87 % | SYSTOLIC BLOOD PRESSURE: 136 MMHG | HEIGHT: 62 IN | HEART RATE: 97 BPM | DIASTOLIC BLOOD PRESSURE: 80 MMHG | WEIGHT: 171 LBS | BODY MASS INDEX: 31.47 KG/M2

## 2023-12-05 DIAGNOSIS — R00.2 PALPITATIONS: ICD-10-CM

## 2023-12-05 DIAGNOSIS — E78.2 MIXED HYPERLIPIDEMIA: ICD-10-CM

## 2023-12-05 DIAGNOSIS — Z72.0 TOBACCO ABUSE: ICD-10-CM

## 2023-12-05 DIAGNOSIS — Z76.89 ENCOUNTER TO ESTABLISH CARE: ICD-10-CM

## 2023-12-05 DIAGNOSIS — I25.10 CORONARY ARTERY DISEASE INVOLVING NATIVE CORONARY ARTERY OF NATIVE HEART WITHOUT ANGINA PECTORIS: Primary | ICD-10-CM

## 2023-12-05 PROCEDURE — 3079F DIAST BP 80-89 MM HG: CPT | Performed by: INTERNAL MEDICINE

## 2023-12-05 PROCEDURE — 93000 ELECTROCARDIOGRAM COMPLETE: CPT | Performed by: INTERNAL MEDICINE

## 2023-12-05 PROCEDURE — 3075F SYST BP GE 130 - 139MM HG: CPT | Performed by: INTERNAL MEDICINE

## 2023-12-05 PROCEDURE — 99204 OFFICE O/P NEW MOD 45 MIN: CPT | Performed by: INTERNAL MEDICINE

## 2023-12-05 RX ORDER — NITROGLYCERIN 0.4 MG/1
0.4 TABLET SUBLINGUAL EVERY 5 MIN PRN
Qty: 25 TABLET | Refills: 3 | Status: SHIPPED | OUTPATIENT
Start: 2023-12-05

## 2023-12-05 NOTE — PATIENT INSTRUCTIONS
PLAN:  Highly recommend you stop smoking. 1-800-QUIT-NOW  Recommend starting Jardiance 10 mg daily   Continue ASA, clopidogrel, furosemide, metoprolol, rosuvastatin as well as all other medications. Should the palpitations become more frequent or bothersome call our office and we will place a cardiac monitor to evaluate  Fasting Labs - lipids (8 hours)   6.     We will call you with the results

## 2023-12-06 RX ORDER — ITRACONAZOLE 100 MG/1
100 CAPSULE ORAL DAILY
Qty: 1 CAPSULE | Refills: 0
Start: 2023-12-06 | End: 2023-12-06

## 2023-12-06 NOTE — RESULT ENCOUNTER NOTE
I called patient and asked for call back. I'd like to see her next week in the office to discuss treatment for a possible fungal infection, which is what her biopsy suggested is the problem. She will need a CMP either prior to seeing me or immediately after.   Dx drug monitoring

## 2023-12-13 ENCOUNTER — HOSPITAL ENCOUNTER (OUTPATIENT)
Age: 63
Discharge: HOME | End: 2023-12-13
Payer: COMMERCIAL

## 2023-12-13 ENCOUNTER — OFFICE VISIT (OUTPATIENT)
Dept: PULMONOLOGY | Age: 63
End: 2023-12-13
Payer: COMMERCIAL

## 2023-12-13 DIAGNOSIS — D71 GRANULOMATOUS DISEASE (HCC): ICD-10-CM

## 2023-12-13 DIAGNOSIS — E11.9 DM TYPE 2, GOAL HBA1C 7%-8% (HCC): ICD-10-CM

## 2023-12-13 DIAGNOSIS — Z51.81 THERAPEUTIC DRUG MONITORING: Primary | ICD-10-CM

## 2023-12-13 DIAGNOSIS — Z51.81 THERAPEUTIC DRUG MONITORING: ICD-10-CM

## 2023-12-13 DIAGNOSIS — R91.1 PULMONARY NODULE: ICD-10-CM

## 2023-12-13 DIAGNOSIS — I25.10 CORONARY ARTERY DISEASE INVOLVING NATIVE HEART WITHOUT ANGINA PECTORIS, UNSPECIFIED VESSEL OR LESION TYPE: ICD-10-CM

## 2023-12-13 DIAGNOSIS — E78.2 MIXED HYPERLIPIDEMIA: ICD-10-CM

## 2023-12-13 LAB
ALBUMIN SERPL-MCNC: 4.6 G/DL (ref 3.4–5)
ALBUMIN/GLOB SERPL: 1.4 {RATIO} (ref 1.1–2.2)
ALP SERPL-CCNC: 109 U/L (ref 40–129)
ALT SERPL-CCNC: 15 U/L (ref 10–40)
ANION GAP SERPL CALCULATED.3IONS-SCNC: 14 MMOL/L (ref 3–16)
AST SERPL-CCNC: 16 U/L (ref 15–37)
BILIRUB SERPL-MCNC: <0.2 MG/DL (ref 0–1)
BUN SERPL-MCNC: 12 MG/DL (ref 7–20)
CALCIUM SERPL-MCNC: 9.1 MG/DL (ref 8.3–10.6)
CHLORIDE SERPL-SCNC: 101 MMOL/L (ref 99–110)
CHOLEST SERPL-MCNC: 144 MG/DL (ref 0–199)
CO2 SERPL-SCNC: 25 MMOL/L (ref 21–32)
CREAT SERPL-MCNC: 1 MG/DL (ref 0.6–1.2)
GFR SERPLBLD CREATININE-BSD FMLA CKD-EPI: >60 ML/MIN/{1.73_M2}
GLUCOSE SERPL-MCNC: 135 MG/DL (ref 70–99)
HDLC SERPL-MCNC: 40 MG/DL (ref 40–60)
LDL CHOLESTEROL CALCULATED: 73 MG/DL
POTASSIUM SERPL-SCNC: 4.3 MMOL/L (ref 3.5–5.1)
PROT SERPL-MCNC: 7.8 G/DL (ref 6.4–8.2)
SODIUM SERPL-SCNC: 140 MMOL/L (ref 136–145)
TRIGL SERPL-MCNC: 154 MG/DL (ref 0–150)
VLDLC SERPL CALC-MCNC: 31 MG/DL

## 2023-12-13 PROCEDURE — 3078F DIAST BP <80 MM HG: CPT | Performed by: INTERNAL MEDICINE

## 2023-12-13 PROCEDURE — 3051F HG A1C>EQUAL 7.0%<8.0%: CPT | Performed by: INTERNAL MEDICINE

## 2023-12-13 PROCEDURE — 83036 HEMOGLOBIN GLYCOSYLATED A1C: CPT

## 2023-12-13 PROCEDURE — 36415 COLL VENOUS BLD VENIPUNCTURE: CPT

## 2023-12-13 PROCEDURE — 99214 OFFICE O/P EST MOD 30 MIN: CPT | Performed by: INTERNAL MEDICINE

## 2023-12-13 PROCEDURE — 3074F SYST BP LT 130 MM HG: CPT | Performed by: INTERNAL MEDICINE

## 2023-12-13 PROCEDURE — 80061 LIPID PANEL: CPT

## 2023-12-13 PROCEDURE — 80053 COMPREHEN METABOLIC PANEL: CPT

## 2023-12-13 RX ORDER — ITRACONAZOLE 100 MG/1
CAPSULE ORAL
Qty: 123 CAPSULE | Refills: 2 | Status: SHIPPED | OUTPATIENT
Start: 2023-12-13

## 2023-12-13 NOTE — PROGRESS NOTES
negative for acid-fast bacilli.     - Sleep -   - Cardiac Testing -    STRESS 2/8/22 no ischemia, EF 60%  ECHO 2/8/22 EF 55%, grade I DD, SPAP 45    ASSESSMENT:  Pulmonary Nodule RLL 1.5 x 2.1 cm, previously 1.2 x 1.7 cm, new vs April 2022 imaging   Atypical cells on EBUS 4R, 11R, subcarinal  Robotic biopsy RLL non-diagnostic   Stable adrenal nodule, favor adenoma  Chronic hypoxemic respiratory failure, known need for oxygen, she has not been able to afford copays  Pulmonary emphysema/COPD  CAD s/p PCI 2014 and 2017, f/b Louie   Ongoing tobacco abuse, > 40 pack year history     PLAN:  Itraconazole 200 tid X 3 then BID for 3 months  CMP today and in 4 weeks  Repeat CT CHEST no IV dye in 3 months, see me in follow up  Trelegy 100 ($0 copay), PRN albuterol  On Plavix   Tobacco cessation is recommended - has been trying to cut down/quit

## 2023-12-14 ENCOUNTER — COMMUNITY OUTREACH (OUTPATIENT)
Dept: FAMILY MEDICINE CLINIC | Age: 63
End: 2023-12-14

## 2023-12-14 ENCOUNTER — TELEPHONE (OUTPATIENT)
Dept: PULMONOLOGY | Age: 63
End: 2023-12-14

## 2023-12-14 VITALS
HEART RATE: 80 BPM | BODY MASS INDEX: 32.76 KG/M2 | HEIGHT: 62 IN | DIASTOLIC BLOOD PRESSURE: 72 MMHG | RESPIRATION RATE: 14 BRPM | OXYGEN SATURATION: 93 % | SYSTOLIC BLOOD PRESSURE: 124 MMHG | WEIGHT: 178 LBS

## 2023-12-14 LAB
EST. AVERAGE GLUCOSE BLD GHB EST-MCNC: 145.6 MG/DL
HBA1C MFR BLD: 6.7 %

## 2023-12-14 NOTE — TELEPHONE ENCOUNTER
----- Message from Fay Sneed MD sent at 12/14/2023 10:02 AM EST -----  Hi - please tell patient her liver and kidney numbers look good - okay to take the new antibiotic as we discussed -- she will need repeat levels in 4 weeks. Orders are already in. Her lipid panel looks good - let her know I forwarded numbers to Dr. Lissy Loomis with cardiology. Lastly, her Hemoglobin A1C, which measures diabetes/blood sugar control, is the best it's been in several years.

## 2023-12-14 NOTE — TELEPHONE ENCOUNTER
----- Message from Iftikhar Malone MD sent at 12/14/2023 10:02 AM EST -----  Hi - please tell patient her liver and kidney numbers look good - okay to take the new antibiotic as we discussed -- she will need repeat levels in 4 weeks. Orders are already in. Her lipid panel looks good - let her know I forwarded numbers to Dr. Eleanor Almaraz with cardiology. Lastly, her Hemoglobin A1C, which measures diabetes/blood sugar control, is the best it's been in several years.

## 2023-12-14 NOTE — TELEPHONE ENCOUNTER
Spoke to pt with verbal confirmation of the results from labs.  Pt will try to get labs done at her PCP office, but if she is unable to get them at PCP will come here to have repeat labs done

## 2023-12-26 ENCOUNTER — TELEPHONE (OUTPATIENT)
Dept: CARDIOLOGY CLINIC | Age: 63
End: 2023-12-26

## 2023-12-26 NOTE — TELEPHONE ENCOUNTER
Message  Received: 1 week ago  MD Bethany Prasad, RN; Tam Wise, GARETH Lott,    Can you let this patient know I felt her lipid profile looks good. Continue same medications. Will plan repeat 6 months. LDL at goal < 70. Want her TG's less than 150 ideally but at this point she's a shade over goal. I would encourage modest weight loss through implementing appropriate dietary measures (mediterranean diet, less carbs/snack/processed foods) +  graded exercise program with the ultimate goal of 150 minutes of aerobic exercise weekly. Follow up as planned. TY!

## 2023-12-27 ENCOUNTER — OFFICE VISIT (OUTPATIENT)
Dept: FAMILY MEDICINE CLINIC | Age: 63
End: 2023-12-27
Payer: COMMERCIAL

## 2023-12-27 VITALS
BODY MASS INDEX: 31.28 KG/M2 | SYSTOLIC BLOOD PRESSURE: 112 MMHG | HEART RATE: 81 BPM | OXYGEN SATURATION: 88 % | WEIGHT: 171 LBS | DIASTOLIC BLOOD PRESSURE: 67 MMHG

## 2023-12-27 DIAGNOSIS — I10 ESSENTIAL HYPERTENSION: ICD-10-CM

## 2023-12-27 DIAGNOSIS — Z12.11 COLON CANCER SCREENING: ICD-10-CM

## 2023-12-27 DIAGNOSIS — J43.1 PANLOBULAR EMPHYSEMA (HCC): ICD-10-CM

## 2023-12-27 DIAGNOSIS — E11.9 TYPE 2 DIABETES MELLITUS WITHOUT COMPLICATION, WITHOUT LONG-TERM CURRENT USE OF INSULIN (HCC): Primary | ICD-10-CM

## 2023-12-27 DIAGNOSIS — R91.1 PULMONARY NODULE: ICD-10-CM

## 2023-12-27 DIAGNOSIS — I25.110 CORONARY ARTERY DISEASE INVOLVING NATIVE CORONARY ARTERY OF NATIVE HEART WITH UNSTABLE ANGINA PECTORIS (HCC): ICD-10-CM

## 2023-12-27 PROCEDURE — 99214 OFFICE O/P EST MOD 30 MIN: CPT | Performed by: FAMILY MEDICINE

## 2023-12-27 PROCEDURE — 3044F HG A1C LEVEL LT 7.0%: CPT | Performed by: FAMILY MEDICINE

## 2023-12-27 PROCEDURE — 3074F SYST BP LT 130 MM HG: CPT | Performed by: FAMILY MEDICINE

## 2023-12-27 PROCEDURE — 3078F DIAST BP <80 MM HG: CPT | Performed by: FAMILY MEDICINE

## 2023-12-27 NOTE — PATIENT INSTRUCTIONS
Please read the healthy family handout that you were given and share it with your family. Please compare this printed medication list with your medications at home to be sure they are the same. If you have any medications that are different please contact us immediately at [phone]. Also review your allergies that we have listed, these may also include medications that you have not been able to tolerate, make sure everything listed is correct. If you have any allergies that are different please contact us immediately at [phone]. You may receive a survey in the mail or by email asking about your experience during your visit today. Please complete and return to us so we know how we are serving you. No Yes

## 2023-12-28 NOTE — TELEPHONE ENCOUNTER
Third and final attempt made to reach the patient. VM left with office number should the patient call back. Letter created and mailed to the patient. Closing encounter.

## 2024-01-03 RX ORDER — FLUTICASONE FUROATE, UMECLIDINIUM BROMIDE AND VILANTEROL TRIFENATATE 100; 62.5; 25 UG/1; UG/1; UG/1
POWDER RESPIRATORY (INHALATION)
Qty: 60 EACH | Refills: 0 | Status: SHIPPED | OUTPATIENT
Start: 2024-01-03

## 2024-01-03 NOTE — TELEPHONE ENCOUNTER
Future appt scheduled 0 appt scheduled     Return in about 6 months (around 6/27/2024).                   Last appt 12/27/2023      Last Written 10/24/2023    fluticasone-umeclidin-vilant (TRELEGY ELLIPTA) 100-62.5-25 MCG/ACT AEPB inhaler  #1 each   0 RF

## 2024-01-05 ENCOUNTER — NURSE ONLY (OUTPATIENT)
Dept: FAMILY MEDICINE CLINIC | Age: 64
End: 2024-01-05
Payer: COMMERCIAL

## 2024-01-05 DIAGNOSIS — Z12.11 SCREENING FOR COLON CANCER: Primary | ICD-10-CM

## 2024-01-05 LAB
CONTROL: NORMAL
FECAL BLOOD IMMUNOCHEMICAL TEST: NEGATIVE

## 2024-01-05 PROCEDURE — 82274 ASSAY TEST FOR BLOOD FECAL: CPT | Performed by: FAMILY MEDICINE

## 2024-01-19 RX ORDER — FLUTICASONE FUROATE, UMECLIDINIUM BROMIDE AND VILANTEROL TRIFENATATE 100; 62.5; 25 UG/1; UG/1; UG/1
POWDER RESPIRATORY (INHALATION)
Qty: 60 EACH | Refills: 0 | Status: SHIPPED | OUTPATIENT
Start: 2024-01-19

## 2024-01-22 RX ORDER — ROSUVASTATIN CALCIUM 20 MG/1
20 TABLET, COATED ORAL DAILY
Qty: 90 TABLET | Refills: 1 | Status: SHIPPED | OUTPATIENT
Start: 2024-01-22

## 2024-01-22 RX ORDER — CLOPIDOGREL BISULFATE 75 MG/1
75 TABLET ORAL DAILY
Qty: 90 TABLET | Refills: 1 | Status: SHIPPED | OUTPATIENT
Start: 2024-01-22

## 2024-03-04 ENCOUNTER — TELEPHONE (OUTPATIENT)
Dept: PULMONOLOGY | Age: 64
End: 2024-03-04

## 2024-03-04 NOTE — TELEPHONE ENCOUNTER
Pt is aware. Scheduled tomorrow Pt stated she has a covid test at home and she will take it at home.

## 2024-03-04 NOTE — TELEPHONE ENCOUNTER
If she would like a visit then can add on to see myself or Dr. Duarte this week.  I'm receiving this at noon so I'm not sure if this will be answered soon enough to see me at 1 or 1:30 today.  If she would like me to just send in medication I can also do that.     Regardless, I do recommend she get tested for Influenza and COVID as these have been very prevalent in the community and we are frequently seeing GI sxs with them, like diarrhea.  Let us know if either test is positive and she may qualify for an antiviral.

## 2024-03-04 NOTE — TELEPHONE ENCOUNTER
Patient called requesting a urgent appointment with Dr Duarte for a asthma flare, Let patient know he is out of office today but would send a sick call back to  on call      Do you have the following symptoms?  Shortness of Breath  some  Wheezing  yes  Cough  yes  Cough Characteristics:  Productive    yes  Sputum Color    white milky  Hemoptysis   no  Consistency of sputum   thick     Fever:    yes    Temp:not checking  Chills/Sweats:  yes  What other symptoms are you having?:  bad cough, diarrhea     How long have you had these symptoms?   Early saturday     Pharmacy: [facility]          Review medications and allergies:        Allergies?  Atenolol, Rocephin        Currently on Antibiotics?  (Drug/Dose/Frequency and how long on?) no        Systemic Steroids?  (Drug/Dose/Frequency and how long on?) no       Last OV 12/13/23 with Dr. Duarte   (pull in last visit note assessment/plan)        ASSESSMENT:  Pulmonary Nodule RLL 1.5 x 2.1 cm, previously 1.2 x 1.7 cm, new vs April 2022 imaging   Atypical cells on EBUS 4R, 11R, subcarinal  Robotic biopsy RLL non-diagnostic   Stable adrenal nodule, favor adenoma  Chronic hypoxemic respiratory failure, known need for oxygen, she has not been able to afford copays  Pulmonary emphysema/COPD  CAD s/p PCI 2014 and 2017, f/b Louie   Ongoing tobacco abuse, > 40 pack year history      PLAN:  Itraconazole 200 tid X 3 then BID for 3 months  CMP today and in 4 weeks  Repeat CT CHEST no IV dye in 3 months, see me in follow up  Trelegy 100 ($0 copay), PRN albuterol  On Plavix   Tobacco cessation is recommended - has been trying to cut down/quit

## 2024-03-05 ENCOUNTER — HOSPITAL ENCOUNTER (OUTPATIENT)
Age: 64
Discharge: HOME | End: 2024-03-05
Payer: COMMERCIAL

## 2024-03-05 ENCOUNTER — OFFICE VISIT (OUTPATIENT)
Dept: PULMONOLOGY | Age: 64
End: 2024-03-05

## 2024-03-05 VITALS
SYSTOLIC BLOOD PRESSURE: 134 MMHG | HEART RATE: 86 BPM | DIASTOLIC BLOOD PRESSURE: 86 MMHG | BODY MASS INDEX: 31.21 KG/M2 | OXYGEN SATURATION: 88 % | RESPIRATION RATE: 14 BRPM | HEIGHT: 62 IN | WEIGHT: 169.6 LBS

## 2024-03-05 DIAGNOSIS — Z51.81 THERAPEUTIC DRUG MONITORING: ICD-10-CM

## 2024-03-05 DIAGNOSIS — J44.1 COPD EXACERBATION (HCC): Primary | ICD-10-CM

## 2024-03-05 LAB
ALBUMIN SERPL-MCNC: 4.2 G/DL (ref 3.4–5)
ALP SERPL-CCNC: 94 U/L (ref 40–129)
ALT SERPL-CCNC: 14 U/L (ref 10–40)
ANION GAP SERPL CALCULATED.3IONS-SCNC: 9 MMOL/L (ref 3–16)
AST SERPL-CCNC: 18 U/L (ref 15–37)
BILIRUB SERPL-MCNC: <0.2 MG/DL (ref 0–1)
BUN SERPL-MCNC: 14 MG/DL (ref 7–20)
CALCIUM SERPL-MCNC: 9 MG/DL (ref 8.3–10.6)
CHLORIDE SERPL-SCNC: 100 MMOL/L (ref 99–110)
CO2 SERPL-SCNC: 27 MMOL/L (ref 21–32)
CREAT SERPL-MCNC: 1 MG/DL (ref 0.6–1.2)
GFR SERPLBLD CREATININE-BSD FMLA CKD-EPI: >60 ML/MIN/{1.73_M2}
GLUCOSE SERPL-MCNC: 107 MG/DL (ref 70–99)
POTASSIUM SERPL-SCNC: 4.1 MMOL/L (ref 3.5–5.1)
PROT SERPL-MCNC: 7.8 G/DL (ref 6.4–8.2)
SODIUM SERPL-SCNC: 136 MMOL/L (ref 136–145)

## 2024-03-05 PROCEDURE — 99214 OFFICE O/P EST MOD 30 MIN: CPT | Performed by: INTERNAL MEDICINE

## 2024-03-05 PROCEDURE — 3079F DIAST BP 80-89 MM HG: CPT | Performed by: INTERNAL MEDICINE

## 2024-03-05 PROCEDURE — 36415 COLL VENOUS BLD VENIPUNCTURE: CPT

## 2024-03-05 PROCEDURE — 80053 COMPREHEN METABOLIC PANEL: CPT

## 2024-03-05 PROCEDURE — 3075F SYST BP GE 130 - 139MM HG: CPT | Performed by: INTERNAL MEDICINE

## 2024-03-05 RX ORDER — GLIPIZIDE 2.5 MG/1
2.5 TABLET, EXTENDED RELEASE ORAL DAILY
COMMUNITY
Start: 2024-02-07

## 2024-03-05 RX ORDER — PREDNISONE 10 MG/1
TABLET ORAL
Qty: 30 TABLET | Refills: 1 | Status: SHIPPED | OUTPATIENT
Start: 2024-03-05 | End: 2024-03-17

## 2024-03-05 RX ORDER — FLUTICASONE FUROATE, UMECLIDINIUM BROMIDE AND VILANTEROL TRIFENATATE 100; 62.5; 25 UG/1; UG/1; UG/1
1 POWDER RESPIRATORY (INHALATION) DAILY
Qty: 1 EACH | Refills: 0 | Status: SHIPPED | COMMUNITY
Start: 2024-03-05

## 2024-03-05 RX ORDER — DOXYCYCLINE HYCLATE 100 MG
100 TABLET ORAL 2 TIMES DAILY
Qty: 14 TABLET | Refills: 0 | Status: SHIPPED | OUTPATIENT
Start: 2024-03-05 | End: 2024-03-06 | Stop reason: SDUPTHER

## 2024-03-05 NOTE — PROGRESS NOTES
punctate nodules are seen bilaterally, too small to characterize by PET.  No confluent airspace disease.  No pneumothorax or pleural effusion.   ABDOMEN/PELVIS: Excretion of activity is seen from bilateral kidneys and activity is seen within the urinary bladder.  Bowel activity is seen which can be physiologically variable.   3.1 x 2.0 cm left adrenal nodule, Hounsfield units 9, compatible with adrenal adenoma.  Low-density thickening is seen of the medial limb of the right adrenal gland.   Focal fat along the falciform.  Calcification of the abdominal aorta and iliac arteries.  Diverticulosis.   IMPRESSION:  1.9 cm irregular right lower lobe pulmonary nodule has metabolic  characteristics most concerning for primary lung malignancy.   Casper metastatic disease at the right hilum and within the mediastinum.  Additional small bilateral pulmonary nodules, too small to characterize by PET, for which continued CT surveillance is recommended. No findings of FDG avid metastatic disease in the abdomen or pelvis.    - Cytopathology & Labs -   EBUS/monarcy 8/21/23  A. Lung, right lower lobe nodule, TBNA  - No malignant cells identified.   B. Lung, right lower lobe nodule, brushings and brush tip:   - No malignant cells identified.   C. Lymph node, 4L, TBNA  - No malignant cells identified.   D. Lymph node, subcarinal, TBNA  - Atypical cells.   E. Lymph node, 4R, TBNA  - Atypical cells.   F. Lymph node, 11R, TBNA  - Atypical cells.   G. Lung, right lower lobe, bronchial alveolar lavage:   - No malignant cells identified.   H. Bronchial washings:   - No malignant cells identified.   COMMENT:   The definitive morphologic features of malignancy are not   present, squamous metaplasia is present in the cellblock of specimen E,   and the atypia noted in specimens D, E, and F may represent reactive   change.  Clinical/radiologic correlation is recommended.  Lymphocytes are   present in specimens C, D, and F.     IR biopsy

## 2024-03-06 ENCOUNTER — TELEPHONE (OUTPATIENT)
Dept: PULMONOLOGY | Age: 64
End: 2024-03-06

## 2024-03-06 RX ORDER — DOXYCYCLINE HYCLATE 100 MG/1
100 CAPSULE ORAL 2 TIMES DAILY
Qty: 14 CAPSULE | Refills: 0 | Status: SHIPPED | OUTPATIENT
Start: 2024-03-06 | End: 2024-03-13

## 2024-03-06 NOTE — TELEPHONE ENCOUNTER
Kaela from [employer] [facility] called regarding a prescription sent yesterday for doxycycline hyclate will need a PA or if a script for doxy jason hydro the capsules form could be send in replace it would be covered. [phone]

## 2024-03-06 NOTE — TELEPHONE ENCOUNTER
Submitted PA for DOXYCYCLINE  Via [facility] Key: [id number]  STATUS: PENDING.    Follow up done daily; if no decision with in three days we will refax.  If another three days goes by with no decision will call the insurance for status.

## 2024-03-07 NOTE — TELEPHONE ENCOUNTER
Message received from [facility] that this was cancelled by provider?  Please advise if this is the case please. Thank you

## 2024-03-08 NOTE — TELEPHONE ENCOUNTER
Called [phone]  Spoke w/ Sherrie and informed medication was changed to capsules. Sherrie confirmed med is filled.     Pt informed medication was sent to [facility].

## 2024-03-08 NOTE — TELEPHONE ENCOUNTER
This medication was sent in on 3/5/24 to keep on hand for acute exacerbation   Please continue the PA

## 2024-03-12 ENCOUNTER — TELEPHONE (OUTPATIENT)
Dept: PULMONOLOGY | Age: 64
End: 2024-03-12

## 2024-03-12 RX ORDER — GLIPIZIDE 2.5 MG/1
2.5 TABLET, EXTENDED RELEASE ORAL DAILY
Qty: 30 TABLET | Refills: 5 | Status: SHIPPED | OUTPATIENT
Start: 2024-03-12

## 2024-03-12 NOTE — TELEPHONE ENCOUNTER
Called pt and left detailed message that since she was recently seen and Dr. Duarte wanted her follow up CT pushed to end up March we will be cancelling her follow up 3/19/24.  Also advised pt that if she is having issues that she needs to discuss or make an appt she will need to call back as Dr. Duarte will not be available 3/19/24 due to family emergency.

## 2024-03-14 NOTE — TELEPHONE ENCOUNTER
See encounter 3/6/24 I sent in Doxycycline capsules on 3/6/24 and cancelled the tablet script since [facility] called saying a PA was needed.

## 2024-03-18 ENCOUNTER — TELEPHONE (OUTPATIENT)
Dept: CARDIOLOGY CLINIC | Age: 64
End: 2024-03-18

## 2024-03-18 NOTE — TELEPHONE ENCOUNTER
Received a fax from \"prior auth portal\", it has been scanned into media. Please see and complete PA for jardiance

## 2024-03-18 NOTE — TELEPHONE ENCOUNTER
Submitted PA for JARDIANCE  Via [facility] Key: [id number] STATUS: NOT SENT    While doing the PAf for this medication I could not find a correct dx code for this.      Please send back with a dx code and then we can finish the PA.    If this requires a response please respond to the pool ( P MHCX PSC MEDICATION PRE-AUTH).      Thank you please advise patient.      Follow up done daily; if no decision with in three days we will refax.  If another three days goes by with no decision will call the insurance for status.

## 2024-03-24 ENCOUNTER — HOSPITAL ENCOUNTER (INPATIENT)
Age: 64
LOS: 1 days | Discharge: HOME | DRG: 190 | End: 2024-03-25
Attending: EMERGENCY MEDICINE | Admitting: INTERNAL MEDICINE
Payer: COMMERCIAL

## 2024-03-24 ENCOUNTER — APPOINTMENT (OUTPATIENT)
Dept: CT IMAGING | Age: 64
DRG: 190 | End: 2024-03-24
Payer: COMMERCIAL

## 2024-03-24 ENCOUNTER — APPOINTMENT (OUTPATIENT)
Dept: GENERAL RADIOLOGY | Age: 64
DRG: 190 | End: 2024-03-24
Payer: COMMERCIAL

## 2024-03-24 DIAGNOSIS — J18.9 PNEUMONIA OF RIGHT MIDDLE LOBE DUE TO INFECTIOUS ORGANISM: ICD-10-CM

## 2024-03-24 DIAGNOSIS — R09.02 HYPOXIA: ICD-10-CM

## 2024-03-24 DIAGNOSIS — J10.1 INFLUENZA B: ICD-10-CM

## 2024-03-24 DIAGNOSIS — J44.1 COPD EXACERBATION (HCC): Primary | ICD-10-CM

## 2024-03-24 LAB
ALBUMIN SERPL-MCNC: 3.8 G/DL (ref 3.4–5)
ALBUMIN/GLOB SERPL: 1.4 {RATIO} (ref 1.1–2.2)
ALP SERPL-CCNC: 77 U/L (ref 40–129)
ALT SERPL-CCNC: 19 U/L (ref 10–40)
ANION GAP SERPL CALCULATED.3IONS-SCNC: 12 MMOL/L (ref 3–16)
AST SERPL-CCNC: 19 U/L (ref 15–37)
BASOPHILS # BLD: 0.1 K/UL (ref 0–0.2)
BASOPHILS NFR BLD: 0.7 %
BILIRUB SERPL-MCNC: <0.2 MG/DL (ref 0–1)
BUN SERPL-MCNC: 13 MG/DL (ref 7–20)
CALCIUM SERPL-MCNC: 8.2 MG/DL (ref 8.3–10.6)
CHLORIDE SERPL-SCNC: 96 MMOL/L (ref 99–110)
CO2 SERPL-SCNC: 24 MMOL/L (ref 21–32)
CREAT SERPL-MCNC: 0.9 MG/DL (ref 0.6–1.2)
DEPRECATED RDW RBC AUTO: 16.5 % (ref 12.4–15.4)
EKG ATRIAL RATE: 100 BPM
EKG DIAGNOSIS: NORMAL
EKG P AXIS: 80 DEGREES
EKG P-R INTERVAL: 152 MS
EKG Q-T INTERVAL: 328 MS
EKG QRS DURATION: 76 MS
EKG QTC CALCULATION (BAZETT): 423 MS
EKG R AXIS: 45 DEGREES
EKG T AXIS: 51 DEGREES
EKG VENTRICULAR RATE: 100 BPM
EOSINOPHIL # BLD: 0 K/UL (ref 0–0.6)
EOSINOPHIL NFR BLD: 0 %
FLUAV RNA RESP QL NAA+PROBE: NOT DETECTED
FLUBV RNA RESP QL NAA+PROBE: DETECTED
GFR SERPLBLD CREATININE-BSD FMLA CKD-EPI: >60 ML/MIN/{1.73_M2}
GLUCOSE BLD-MCNC: 187 MG/DL (ref 70–99)
GLUCOSE SERPL-MCNC: 97 MG/DL (ref 70–99)
HCT VFR BLD AUTO: 50.7 % (ref 36–48)
HGB BLD-MCNC: 16.4 G/DL (ref 12–16)
LACTATE BLDV-SCNC: 0.9 MMOL/L (ref 0.4–1.9)
LYMPHOCYTES # BLD: 1 K/UL (ref 1–5.1)
LYMPHOCYTES NFR BLD: 13.5 %
MCH RBC QN AUTO: 29.1 PG (ref 26–34)
MCHC RBC AUTO-ENTMCNC: 32.4 G/DL (ref 31–36)
MCV RBC AUTO: 89.7 FL (ref 80–100)
MONOCYTES # BLD: 0.9 K/UL (ref 0–1.3)
MONOCYTES NFR BLD: 12 %
NEUTROPHILS # BLD: 5.3 K/UL (ref 1.7–7.7)
NEUTROPHILS NFR BLD: 73.8 %
NT-PROBNP SERPL-MCNC: 218 PG/ML (ref 0–124)
PERFORMED ON: ABNORMAL
PLATELET # BLD AUTO: 143 K/UL (ref 135–450)
PMV BLD AUTO: 8.2 FL (ref 5–10.5)
POTASSIUM SERPL-SCNC: 4.1 MMOL/L (ref 3.5–5.1)
PROCALCITONIN SERPL IA-MCNC: 0.09 NG/ML (ref 0–0.15)
PROT SERPL-MCNC: 6.6 G/DL (ref 6.4–8.2)
RBC # BLD AUTO: 5.65 M/UL (ref 4–5.2)
SARS-COV-2 RNA RESP QL NAA+PROBE: NOT DETECTED
SODIUM SERPL-SCNC: 132 MMOL/L (ref 136–145)
TROPONIN, HIGH SENSITIVITY: 9 NG/L (ref 0–14)
TROPONIN, HIGH SENSITIVITY: 9 NG/L (ref 0–14)
WBC # BLD AUTO: 7.2 K/UL (ref 4–11)

## 2024-03-24 PROCEDURE — 6360000004 HC RX CONTRAST MEDICATION: Performed by: PHYSICIAN ASSISTANT

## 2024-03-24 PROCEDURE — 99285 EMERGENCY DEPT VISIT HI MDM: CPT

## 2024-03-24 PROCEDURE — 80053 COMPREHEN METABOLIC PANEL: CPT

## 2024-03-24 PROCEDURE — 6360000002 HC RX W HCPCS: Performed by: INTERNAL MEDICINE

## 2024-03-24 PROCEDURE — 6370000000 HC RX 637 (ALT 250 FOR IP): Performed by: INTERNAL MEDICINE

## 2024-03-24 PROCEDURE — 87040 BLOOD CULTURE FOR BACTERIA: CPT

## 2024-03-24 PROCEDURE — 96365 THER/PROPH/DIAG IV INF INIT: CPT

## 2024-03-24 PROCEDURE — 85025 COMPLETE CBC W/AUTO DIFF WBC: CPT

## 2024-03-24 PROCEDURE — 96375 TX/PRO/DX INJ NEW DRUG ADDON: CPT

## 2024-03-24 PROCEDURE — 71046 X-RAY EXAM CHEST 2 VIEWS: CPT

## 2024-03-24 PROCEDURE — 87636 SARSCOV2 & INF A&B AMP PRB: CPT

## 2024-03-24 PROCEDURE — 6360000002 HC RX W HCPCS: Performed by: PHYSICIAN ASSISTANT

## 2024-03-24 PROCEDURE — 96366 THER/PROPH/DIAG IV INF ADDON: CPT

## 2024-03-24 PROCEDURE — 83880 ASSAY OF NATRIURETIC PEPTIDE: CPT

## 2024-03-24 PROCEDURE — 84145 PROCALCITONIN (PCT): CPT

## 2024-03-24 PROCEDURE — 96368 THER/DIAG CONCURRENT INF: CPT

## 2024-03-24 PROCEDURE — 93005 ELECTROCARDIOGRAM TRACING: CPT | Performed by: PHYSICIAN ASSISTANT

## 2024-03-24 PROCEDURE — 2580000003 HC RX 258: Performed by: INTERNAL MEDICINE

## 2024-03-24 PROCEDURE — 71260 CT THORAX DX C+: CPT

## 2024-03-24 PROCEDURE — 6370000000 HC RX 637 (ALT 250 FOR IP): Performed by: PHYSICIAN ASSISTANT

## 2024-03-24 PROCEDURE — 84484 ASSAY OF TROPONIN QUANT: CPT

## 2024-03-24 PROCEDURE — 2580000003 HC RX 258: Performed by: PHYSICIAN ASSISTANT

## 2024-03-24 PROCEDURE — 93010 ELECTROCARDIOGRAM REPORT: CPT | Performed by: INTERNAL MEDICINE

## 2024-03-24 PROCEDURE — 83605 ASSAY OF LACTIC ACID: CPT

## 2024-03-24 PROCEDURE — 1200000000 HC SEMI PRIVATE

## 2024-03-24 PROCEDURE — 36415 COLL VENOUS BLD VENIPUNCTURE: CPT

## 2024-03-24 RX ORDER — NICOTINE 21 MG/24HR
1 PATCH, TRANSDERMAL 24 HOURS TRANSDERMAL DAILY
Status: DISCONTINUED | OUTPATIENT
Start: 2024-03-24 | End: 2024-03-25 | Stop reason: HOSPADM

## 2024-03-24 RX ORDER — ACETAMINOPHEN 325 MG/1
650 TABLET ORAL EVERY 6 HOURS PRN
Status: DISCONTINUED | OUTPATIENT
Start: 2024-03-24 | End: 2024-03-25 | Stop reason: HOSPADM

## 2024-03-24 RX ORDER — POTASSIUM CHLORIDE 7.45 MG/ML
10 INJECTION INTRAVENOUS PRN
Status: DISCONTINUED | OUTPATIENT
Start: 2024-03-24 | End: 2024-03-25 | Stop reason: HOSPADM

## 2024-03-24 RX ORDER — ENOXAPARIN SODIUM 100 MG/ML
40 INJECTION SUBCUTANEOUS EVERY 24 HOURS
Status: DISCONTINUED | OUTPATIENT
Start: 2024-03-24 | End: 2024-03-25 | Stop reason: HOSPADM

## 2024-03-24 RX ORDER — IPRATROPIUM BROMIDE AND ALBUTEROL SULFATE 2.5; .5 MG/3ML; MG/3ML
1 SOLUTION RESPIRATORY (INHALATION) ONCE
Status: COMPLETED | OUTPATIENT
Start: 2024-03-24 | End: 2024-03-24

## 2024-03-24 RX ORDER — OSELTAMIVIR PHOSPHATE 75 MG/1
75 CAPSULE ORAL ONCE
Status: COMPLETED | OUTPATIENT
Start: 2024-03-24 | End: 2024-03-24

## 2024-03-24 RX ORDER — ONDANSETRON 2 MG/ML
4 INJECTION INTRAMUSCULAR; INTRAVENOUS EVERY 6 HOURS PRN
Status: DISCONTINUED | OUTPATIENT
Start: 2024-03-24 | End: 2024-03-25 | Stop reason: HOSPADM

## 2024-03-24 RX ORDER — SODIUM CHLORIDE 0.9 % (FLUSH) 0.9 %
5-40 SYRINGE (ML) INJECTION EVERY 12 HOURS SCHEDULED
Status: DISCONTINUED | OUTPATIENT
Start: 2024-03-24 | End: 2024-03-25 | Stop reason: HOSPADM

## 2024-03-24 RX ORDER — ACETAMINOPHEN 650 MG/1
650 SUPPOSITORY RECTAL EVERY 6 HOURS PRN
Status: DISCONTINUED | OUTPATIENT
Start: 2024-03-24 | End: 2024-03-25 | Stop reason: HOSPADM

## 2024-03-24 RX ORDER — IPRATROPIUM BROMIDE AND ALBUTEROL SULFATE 2.5; .5 MG/3ML; MG/3ML
1 SOLUTION RESPIRATORY (INHALATION)
Status: DISCONTINUED | OUTPATIENT
Start: 2024-03-25 | End: 2024-03-25 | Stop reason: HOSPADM

## 2024-03-24 RX ORDER — 0.9 % SODIUM CHLORIDE 0.9 %
30 INTRAVENOUS SOLUTION INTRAVENOUS ONCE
Status: COMPLETED | OUTPATIENT
Start: 2024-03-24 | End: 2024-03-24

## 2024-03-24 RX ORDER — GLUCAGON 1 MG/ML
1 KIT INJECTION PRN
Status: DISCONTINUED | OUTPATIENT
Start: 2024-03-24 | End: 2024-03-25 | Stop reason: HOSPADM

## 2024-03-24 RX ORDER — INSULIN LISPRO 100 [IU]/ML
0-4 INJECTION, SOLUTION INTRAVENOUS; SUBCUTANEOUS NIGHTLY
Status: DISCONTINUED | OUTPATIENT
Start: 2024-03-24 | End: 2024-03-25 | Stop reason: HOSPADM

## 2024-03-24 RX ORDER — BENZONATATE 100 MG/1
100 CAPSULE ORAL 3 TIMES DAILY PRN
Status: DISCONTINUED | OUTPATIENT
Start: 2024-03-24 | End: 2024-03-25 | Stop reason: HOSPADM

## 2024-03-24 RX ORDER — ITRACONAZOLE 100 MG/1
200 CAPSULE ORAL 2 TIMES DAILY
Status: DISCONTINUED | OUTPATIENT
Start: 2024-03-24 | End: 2024-03-25 | Stop reason: HOSPADM

## 2024-03-24 RX ORDER — LEVOFLOXACIN 5 MG/ML
750 INJECTION, SOLUTION INTRAVENOUS ONCE
Status: COMPLETED | OUTPATIENT
Start: 2024-03-24 | End: 2024-03-24

## 2024-03-24 RX ORDER — LEVOFLOXACIN 750 MG/1
750 TABLET, FILM COATED ORAL DAILY
Status: DISCONTINUED | OUTPATIENT
Start: 2024-03-25 | End: 2024-03-25

## 2024-03-24 RX ORDER — SODIUM CHLORIDE 9 MG/ML
INJECTION, SOLUTION INTRAVENOUS PRN
Status: DISCONTINUED | OUTPATIENT
Start: 2024-03-24 | End: 2024-03-25 | Stop reason: HOSPADM

## 2024-03-24 RX ORDER — POTASSIUM CHLORIDE 20 MEQ/1
40 TABLET, EXTENDED RELEASE ORAL PRN
Status: DISCONTINUED | OUTPATIENT
Start: 2024-03-24 | End: 2024-03-25 | Stop reason: HOSPADM

## 2024-03-24 RX ORDER — ROSUVASTATIN CALCIUM 10 MG/1
20 TABLET, COATED ORAL DAILY
Status: DISCONTINUED | OUTPATIENT
Start: 2024-03-25 | End: 2024-03-25 | Stop reason: HOSPADM

## 2024-03-24 RX ORDER — SODIUM CHLORIDE 0.9 % (FLUSH) 0.9 %
5-40 SYRINGE (ML) INJECTION PRN
Status: DISCONTINUED | OUTPATIENT
Start: 2024-03-24 | End: 2024-03-25 | Stop reason: HOSPADM

## 2024-03-24 RX ORDER — INSULIN LISPRO 100 [IU]/ML
0-4 INJECTION, SOLUTION INTRAVENOUS; SUBCUTANEOUS
Status: DISCONTINUED | OUTPATIENT
Start: 2024-03-25 | End: 2024-03-25 | Stop reason: HOSPADM

## 2024-03-24 RX ORDER — MAGNESIUM SULFATE IN WATER 40 MG/ML
2000 INJECTION, SOLUTION INTRAVENOUS PRN
Status: DISCONTINUED | OUTPATIENT
Start: 2024-03-24 | End: 2024-03-25 | Stop reason: HOSPADM

## 2024-03-24 RX ORDER — ONDANSETRON 4 MG/1
4 TABLET, ORALLY DISINTEGRATING ORAL EVERY 8 HOURS PRN
Status: DISCONTINUED | OUTPATIENT
Start: 2024-03-24 | End: 2024-03-25 | Stop reason: HOSPADM

## 2024-03-24 RX ORDER — GUAIFENESIN 600 MG/1
600 TABLET, EXTENDED RELEASE ORAL 2 TIMES DAILY
Status: DISCONTINUED | OUTPATIENT
Start: 2024-03-24 | End: 2024-03-25 | Stop reason: HOSPADM

## 2024-03-24 RX ORDER — GUAIFENESIN/DEXTROMETHORPHAN 100-10MG/5
5 SYRUP ORAL EVERY 4 HOURS PRN
Status: DISCONTINUED | OUTPATIENT
Start: 2024-03-24 | End: 2024-03-25 | Stop reason: HOSPADM

## 2024-03-24 RX ORDER — POLYETHYLENE GLYCOL 3350 17 G/17G
17 POWDER, FOR SOLUTION ORAL DAILY PRN
Status: DISCONTINUED | OUTPATIENT
Start: 2024-03-24 | End: 2024-03-25 | Stop reason: HOSPADM

## 2024-03-24 RX ORDER — DEXTROSE MONOHYDRATE 100 MG/ML
INJECTION, SOLUTION INTRAVENOUS CONTINUOUS PRN
Status: DISCONTINUED | OUTPATIENT
Start: 2024-03-24 | End: 2024-03-25 | Stop reason: HOSPADM

## 2024-03-24 RX ORDER — METOPROLOL SUCCINATE 25 MG/1
25 TABLET, EXTENDED RELEASE ORAL DAILY
Status: DISCONTINUED | OUTPATIENT
Start: 2024-03-25 | End: 2024-03-25 | Stop reason: HOSPADM

## 2024-03-24 RX ORDER — OSELTAMIVIR PHOSPHATE 75 MG/1
75 CAPSULE ORAL 2 TIMES DAILY
Status: DISCONTINUED | OUTPATIENT
Start: 2024-03-25 | End: 2024-03-25 | Stop reason: HOSPADM

## 2024-03-24 RX ADMIN — PIPERACILLIN AND TAZOBACTAM 3375 MG: 3; .375 INJECTION, POWDER, FOR SOLUTION INTRAVENOUS at 12:58

## 2024-03-24 RX ADMIN — SODIUM CHLORIDE 1503 ML: 9 INJECTION, SOLUTION INTRAVENOUS at 12:55

## 2024-03-24 RX ADMIN — IOPAMIDOL 75 ML: 755 INJECTION, SOLUTION INTRAVENOUS at 14:05

## 2024-03-24 RX ADMIN — GUAIFENESIN 600 MG: 600 TABLET, EXTENDED RELEASE ORAL at 20:33

## 2024-03-24 RX ADMIN — IPRATROPIUM BROMIDE AND ALBUTEROL SULFATE 1 DOSE: 2.5; .5 SOLUTION RESPIRATORY (INHALATION) at 12:57

## 2024-03-24 RX ADMIN — ENOXAPARIN SODIUM 40 MG: 100 INJECTION SUBCUTANEOUS at 20:33

## 2024-03-24 RX ADMIN — OSELTAMIVIR PHOSPHATE 75 MG: 75 CAPSULE ORAL at 16:55

## 2024-03-24 RX ADMIN — Medication 10 ML: at 20:33

## 2024-03-24 RX ADMIN — ITRACONAZOLE 200 MG: 100 CAPSULE, COATED PELLETS ORAL at 20:53

## 2024-03-24 RX ADMIN — LEVOFLOXACIN 750 MG: 5 INJECTION, SOLUTION INTRAVENOUS at 13:39

## 2024-03-24 RX ADMIN — IPRATROPIUM BROMIDE AND ALBUTEROL SULFATE 1 DOSE: 2.5; .5 SOLUTION RESPIRATORY (INHALATION) at 16:03

## 2024-03-24 RX ADMIN — WATER 125 MG: 1 INJECTION INTRAMUSCULAR; INTRAVENOUS; SUBCUTANEOUS at 12:55

## 2024-03-24 ASSESSMENT — PAIN - FUNCTIONAL ASSESSMENT: PAIN_FUNCTIONAL_ASSESSMENT: NONE - DENIES PAIN

## 2024-03-24 ASSESSMENT — PAIN SCALES - GENERAL: PAINLEVEL_OUTOF10: 0

## 2024-03-24 NOTE — ED PROVIDER NOTES
[facility] ED  EMERGENCY DEPARTMENT ENCOUNTER        Pt Name: Esperanza Rubalcava  MRN: [id number]  Birthdate 1960  Date of evaluation: 3/24/2024  Provider: Lianet Park PA-C  PCP: Solitario Blackwell MD  Note Started: 11:56 AM EDT 3/24/24       I have seen and evaluated this patient with my supervising physician Dr. Arzate      CHIEF COMPLAINT       Chief Complaint   Patient presents with    Shortness of Breath     Patient reports she is having trouble breathing. States she has a \"severe asthma attack\" a couple weeks ago and been SOB, cough and not feeling well since. 02 83% RA in triage, does not have 02 at home.        HISTORY OF PRESENT ILLNESS: 1 or more Elements     History From: Patient and son            Chief Complaint: Shortness of breath    Esperanza Rubalcava is a 63 y.o. female who presents because she has had worsening dyspnea over the past couple weeks with a productive cough.  She admits to weakness.  She just saw her pulmonologist Dr. Tan a couple weeks ago after a \"asthma attack \".  She is just finishing her steroids and antibiotic.  She states she also has a fungal lung infection.  She states she cannot afford oxygen and has been needing it for a few years now.  She still smokes about 5 cigarettes a day.  She denies chest pain vomiting diarrhea abdominal pain fever.  She has been using her nebulizers without relief.    Nursing Notes were all reviewed and agreed with or any disagreements were addressed in the HPI.    REVIEW OF SYSTEMS :      Review of Systems    Positives and Pertinent negatives as per HPI.     SURGICAL HISTORY     Past Surgical History:   Procedure Laterality Date    BREAST BIOPSY      BRONCHOSCOPY  8/21/2023    BRONCHOSCOPY/TRANSBRONCHIAL NEEDLE BIOPSY ROBOTIC performed by Gilberto Duarte MD at [facility] ENDOSCOPY    BRONCHOSCOPY N/A 8/21/2023    BRONCHOSCOPY BRUSHINGS ROBOTIC performed by Gilberto Duarte MD at [facility] ENDOSCOPY    BRONCHOSCOPY N/A 8/21/2023

## 2024-03-24 NOTE — ED PROVIDER NOTES
In addition to the advanced practice provider, I personally saw Esperanza Rubalcava and performed a substantive portion of the visit including all aspects of the medical decision making.    Medical Decision Making  Patient seen and evaluated at bedside.  Briefly, patient is presenting with worsening dyspnea over the last couple weeks with productive cough.  She is found to have a right lower lobe pneumonia as well as testing positive for influenza B.  She was started on Zosyn and Levaquin given that she has been on itraconazole and doxycycline at home.    EKG  Normal sinus rhythm with no acute elevations.  Normal axis.  Ventricular to 100 bpm.  QTc of 423.    SEP-1  Is this patient to be included in the SEP-1 Core Measure due to severe sepsis or septic shock?   No   Exclusion criteria - the patient is NOT to be included for SEP-1 Core Measure due to:  2+ SIRS criteria are not met    Screenings     Ancram Coma Scale  Eye Opening: Spontaneous  Best Verbal Response: Oriented  Best Motor Response: Obeys commands  Ancram Coma Scale Score: 15             Patient Referrals:  No follow-up provider specified.    Discharge Medications:  New Prescriptions    No medications on file       FINAL IMPRESSION  1. COPD exacerbation (HCC)    2. Pneumonia of right middle lobe due to infectious organism    3. Hypoxia        Blood pressure 115/68, pulse (!) 101, temperature 98.8 °F (37.1 °C), resp. rate (!) 35, height 1.575 m (5' 2\"), weight 77.1 kg (170 lb), SpO2 93 %, not currently breastfeeding.     I personally saw the patient and made/approved the management plan and take responsibility for the patient management.    For further details of Esperanza Rubalcava's emergency department encounter, please see documentation by advanced practice provider, Lianet Park.       Kris Chris,   03/24/24 [id number]

## 2024-03-24 NOTE — PLAN OF CARE
COPD Exacerbation  PMH of pulmonary nodule, follows with pulmonology    2W    Hold plavix for now in case of procedure

## 2024-03-25 ENCOUNTER — TELEPHONE (OUTPATIENT)
Dept: PULMONOLOGY | Age: 64
End: 2024-03-25

## 2024-03-25 VITALS
HEART RATE: 76 BPM | HEIGHT: 62 IN | SYSTOLIC BLOOD PRESSURE: 119 MMHG | WEIGHT: 169.3 LBS | DIASTOLIC BLOOD PRESSURE: 64 MMHG | OXYGEN SATURATION: 92 % | BODY MASS INDEX: 31.15 KG/M2 | RESPIRATION RATE: 19 BRPM | TEMPERATURE: 97.9 F

## 2024-03-25 PROBLEM — J10.1 INFLUENZA B: Status: ACTIVE | Noted: 2024-03-25

## 2024-03-25 PROBLEM — R59.0 MEDIASTINAL LYMPHADENOPATHY: Status: ACTIVE | Noted: 2024-03-25

## 2024-03-25 PROBLEM — J96.21 ACUTE ON CHRONIC HYPOXIC RESPIRATORY FAILURE (HCC): Status: ACTIVE | Noted: 2024-03-25

## 2024-03-25 PROBLEM — J18.9 PNEUMONIA OF RIGHT MIDDLE LOBE DUE TO INFECTIOUS ORGANISM: Status: ACTIVE | Noted: 2024-03-25

## 2024-03-25 LAB
ANION GAP SERPL CALCULATED.3IONS-SCNC: 10 MMOL/L (ref 3–16)
ANION GAP SERPL CALCULATED.3IONS-SCNC: 7 MMOL/L (ref 3–16)
BASOPHILS # BLD: 0 K/UL (ref 0–0.2)
BASOPHILS NFR BLD: 0.6 %
BUN SERPL-MCNC: 15 MG/DL (ref 7–20)
BUN SERPL-MCNC: 21 MG/DL (ref 7–20)
CALCIUM SERPL-MCNC: 8.1 MG/DL (ref 8.3–10.6)
CALCIUM SERPL-MCNC: 8.5 MG/DL (ref 8.3–10.6)
CHLORIDE SERPL-SCNC: 104 MMOL/L (ref 99–110)
CHLORIDE SERPL-SCNC: 108 MMOL/L (ref 99–110)
CO2 SERPL-SCNC: 22 MMOL/L (ref 21–32)
CO2 SERPL-SCNC: 26 MMOL/L (ref 21–32)
CREAT SERPL-MCNC: 0.7 MG/DL (ref 0.6–1.2)
CREAT SERPL-MCNC: 0.8 MG/DL (ref 0.6–1.2)
DEPRECATED RDW RBC AUTO: 16.1 % (ref 12.4–15.4)
EOSINOPHIL # BLD: 0 K/UL (ref 0–0.6)
EOSINOPHIL NFR BLD: 0 %
GFR SERPLBLD CREATININE-BSD FMLA CKD-EPI: 84 ML/MIN/{1.73_M2}
GFR SERPLBLD CREATININE-BSD FMLA CKD-EPI: >60 ML/MIN/{1.73_M2}
GLUCOSE BLD-MCNC: 136 MG/DL (ref 70–99)
GLUCOSE BLD-MCNC: 140 MG/DL (ref 70–99)
GLUCOSE SERPL-MCNC: 159 MG/DL (ref 70–99)
GLUCOSE SERPL-MCNC: 180 MG/DL (ref 70–99)
HCT VFR BLD AUTO: 47 % (ref 36–48)
HGB BLD-MCNC: 15.2 G/DL (ref 12–16)
LYMPHOCYTES # BLD: 0.6 K/UL (ref 1–5.1)
LYMPHOCYTES NFR BLD: 11.4 %
MCH RBC QN AUTO: 28.8 PG (ref 26–34)
MCHC RBC AUTO-ENTMCNC: 32.3 G/DL (ref 31–36)
MCV RBC AUTO: 89.2 FL (ref 80–100)
MONOCYTES # BLD: 0.6 K/UL (ref 0–1.3)
MONOCYTES NFR BLD: 10.6 %
NEUTROPHILS # BLD: 4 K/UL (ref 1.7–7.7)
NEUTROPHILS NFR BLD: 77.4 %
PERFORMED ON: ABNORMAL
PERFORMED ON: ABNORMAL
PLATELET # BLD AUTO: 140 K/UL (ref 135–450)
PMV BLD AUTO: 8.1 FL (ref 5–10.5)
POTASSIUM SERPL-SCNC: 4.2 MMOL/L (ref 3.5–5.1)
POTASSIUM SERPL-SCNC: 5.3 MMOL/L (ref 3.5–5.1)
RBC # BLD AUTO: 5.27 M/UL (ref 4–5.2)
SODIUM SERPL-SCNC: 136 MMOL/L (ref 136–145)
SODIUM SERPL-SCNC: 141 MMOL/L (ref 136–145)
WBC # BLD AUTO: 5.2 K/UL (ref 4–11)

## 2024-03-25 PROCEDURE — 2580000003 HC RX 258: Performed by: INTERNAL MEDICINE

## 2024-03-25 PROCEDURE — 85025 COMPLETE CBC W/AUTO DIFF WBC: CPT

## 2024-03-25 PROCEDURE — 6370000000 HC RX 637 (ALT 250 FOR IP): Performed by: INTERNAL MEDICINE

## 2024-03-25 PROCEDURE — 94010 BREATHING CAPACITY TEST: CPT

## 2024-03-25 PROCEDURE — 94640 AIRWAY INHALATION TREATMENT: CPT

## 2024-03-25 PROCEDURE — 99223 1ST HOSP IP/OBS HIGH 75: CPT | Performed by: NURSE PRACTITIONER

## 2024-03-25 PROCEDURE — 99223 1ST HOSP IP/OBS HIGH 75: CPT | Performed by: INTERNAL MEDICINE

## 2024-03-25 PROCEDURE — 80048 BASIC METABOLIC PNL TOTAL CA: CPT

## 2024-03-25 PROCEDURE — 6360000002 HC RX W HCPCS: Performed by: INTERNAL MEDICINE

## 2024-03-25 PROCEDURE — 6370000000 HC RX 637 (ALT 250 FOR IP)

## 2024-03-25 PROCEDURE — 36415 COLL VENOUS BLD VENIPUNCTURE: CPT

## 2024-03-25 RX ORDER — FUROSEMIDE 20 MG/1
20 TABLET ORAL DAILY
Status: DISCONTINUED | OUTPATIENT
Start: 2024-03-26 | End: 2024-03-25 | Stop reason: HOSPADM

## 2024-03-25 RX ORDER — ASPIRIN 81 MG/1
81 TABLET, CHEWABLE ORAL DAILY
Status: DISCONTINUED | OUTPATIENT
Start: 2024-03-25 | End: 2024-03-25 | Stop reason: HOSPADM

## 2024-03-25 RX ORDER — IPRATROPIUM BROMIDE AND ALBUTEROL SULFATE 2.5; .5 MG/3ML; MG/3ML
1 SOLUTION RESPIRATORY (INHALATION) EVERY 4 HOURS PRN
Status: DISCONTINUED | OUTPATIENT
Start: 2024-03-25 | End: 2024-03-25 | Stop reason: HOSPADM

## 2024-03-25 RX ORDER — OSELTAMIVIR PHOSPHATE 75 MG/1
75 CAPSULE ORAL 2 TIMES DAILY
Qty: 8 CAPSULE | Refills: 0 | Status: SHIPPED | OUTPATIENT
Start: 2024-03-25 | End: 2024-03-29

## 2024-03-25 RX ORDER — PREDNISONE 10 MG/1
TABLET ORAL
Qty: 30 TABLET | Refills: 0 | Status: SHIPPED | OUTPATIENT
Start: 2024-03-25 | End: 2024-04-06

## 2024-03-25 RX ORDER — IPRATROPIUM BROMIDE AND ALBUTEROL SULFATE 2.5; .5 MG/3ML; MG/3ML
1 SOLUTION RESPIRATORY (INHALATION)
Status: DISCONTINUED | OUTPATIENT
Start: 2024-03-25 | End: 2024-03-25

## 2024-03-25 RX ORDER — CLOPIDOGREL BISULFATE 75 MG/1
75 TABLET ORAL DAILY
Status: DISCONTINUED | OUTPATIENT
Start: 2024-03-25 | End: 2024-03-25 | Stop reason: HOSPADM

## 2024-03-25 RX ADMIN — ROSUVASTATIN CALCIUM 20 MG: 10 TABLET, FILM COATED ORAL at 09:16

## 2024-03-25 RX ADMIN — GUAIFENESIN 600 MG: 600 TABLET, EXTENDED RELEASE ORAL at 09:17

## 2024-03-25 RX ADMIN — OSELTAMIVIR PHOSPHATE 75 MG: 75 CAPSULE ORAL at 09:17

## 2024-03-25 RX ADMIN — ASPIRIN 81 MG: 81 TABLET, CHEWABLE ORAL at 12:53

## 2024-03-25 RX ADMIN — CLOPIDOGREL BISULFATE 75 MG: 75 TABLET ORAL at 12:53

## 2024-03-25 RX ADMIN — ITRACONAZOLE 200 MG: 100 CAPSULE, COATED PELLETS ORAL at 09:59

## 2024-03-25 RX ADMIN — IPRATROPIUM BROMIDE AND ALBUTEROL SULFATE 1 DOSE: 2.5; .5 SOLUTION RESPIRATORY (INHALATION) at 11:52

## 2024-03-25 RX ADMIN — WATER 40 MG: 1 INJECTION INTRAMUSCULAR; INTRAVENOUS; SUBCUTANEOUS at 09:16

## 2024-03-25 RX ADMIN — IPRATROPIUM BROMIDE AND ALBUTEROL SULFATE 1 DOSE: 2.5; .5 SOLUTION RESPIRATORY (INHALATION) at 15:35

## 2024-03-25 RX ADMIN — METOPROLOL SUCCINATE 25 MG: 25 TABLET, FILM COATED, EXTENDED RELEASE ORAL at 09:17

## 2024-03-25 RX ADMIN — LEVOFLOXACIN 750 MG: 750 TABLET, FILM COATED ORAL at 09:17

## 2024-03-25 RX ADMIN — Medication 10 ML: at 09:17

## 2024-03-25 ASSESSMENT — COPD QUESTIONNAIRES
CAT_TOTALSCORE: 19
QUESTION1_COUGHFREQUENCY: 3
QUESTION2_CHESTPHLEGM: 4
QUESTION7_SLEEPQUALITY: 5
QUESTION4_WALKINCLINE: 2
QUESTION6_LEAVINGHOUSE: 0
QUESTION3_CHESTTIGHTNESS: 1
QUESTION8_ENERGYLEVEL: 3
QUESTION5_HOMEACTIVITIES: 1

## 2024-03-25 ASSESSMENT — PAIN SCALES - GENERAL: PAINLEVEL_OUTOF10: 0

## 2024-03-25 NOTE — CARE COORDINATION
Advance Care Planning     General Advance Care Planning (ACP) Conversation    Date of Conversation: 3/25/2024  Conducted with: Patient with Decision Making Capacity    Son is legal next of kin.     Healthcare Decision Maker:  No healthcare decision makers have been documented.  Click here to complete HealthCare Decision Makers including selection of the Healthcare Decision Maker Relationship (ie \"Primary\")   Today we documented Decision Maker(s) consistent with Legal Next of Kin hierarchy.    Content/Action Overview:  DECLINED ACP Conversation - will revisit periodically  Reviewed DNR/DNI and patient elects Full Code (Attempt Resuscitation)        Length of Voluntary ACP Conversation in minutes:  <16 minutes (Non-Billable)    Teresa Boeck, RN

## 2024-03-25 NOTE — CONSULTS
guaiFENesin-dextromethorphan    ALLERGIES:  Patient is allergic to atenolol and ceftriaxone.    REVIEW OF SYSTEMS:  Constitutional: Chills  HENT: Negative for sore throat  Eyes: Negative for redness   Respiratory: + for dyspnea, cough  Cardiovascular: Negative for chest pain  Gastrointestinal: Negative for vomiting, diarrhea   Genitourinary: Negative for hematuria   Musculoskeletal: Negative for arthralgias   Skin: Negative for rash  Neurological: Negative for syncope  Hematological: Negative for adenopathy  Psychiatric/Behavorial: Negative for anxiety    PHYSICAL EXAM:  Blood pressure 119/64, pulse 76, temperature 97.9 °F (36.6 °C), temperature source Temporal, resp. rate 19, height 1.575 m (5' 2\"), weight 76.8 kg (169 lb 4.8 oz), SpO2 92 %, not currently breastfeeding.' on 3 L   General: NAD  Eyes: PERRL. No sclera icterus. No conjunctival injection.  ENT: No discharge. Pharynx clear.  Neck: Trachea midline. Normal thyroid.  Resp: No accessory muscle use. No crackles. No wheezing. No rhonchi. No dullness on percussion.  Poor air movement bilaterally  CV: Regular rate. Regular rhythm. No mumur or rub. No edema. Peripheral pulses are 2+.  Capillary refill is less than 3 seconds.  GI: Non-tender. Non-distended. No masses. No organomegaly. Normal bowel sounds. No hernia.  Skin: Warm and dry. No nodule on exposed extremities. No rash on exposed extremities.  Lymph: No cervical LAD. No supraclavicular LAD.   M/S: No cyanosis. No joint deformity. No clubbing.   Neuro: Alert and oriented x 3. Patellar reflexes are symmetric.  Psych: No agitation, no anxiety, affect is full.     LABS:  CBC:   Recent Labs     03/24/24  1233 03/25/24  0735   WBC 7.2 5.2   HGB 16.4* 15.2   HCT 50.7* 47.0   MCV 89.7 89.2    140     BMP:   Recent Labs     03/24/24  1310 03/25/24  0735   * 141   K 4.1 5.3*   CL 96* 108   CO2 24 26   BUN 13 15   CREATININE 0.9 0.7     LIVER PROFILE:   Recent Labs     03/24/24  1310   AST 19   ALT 19

## 2024-03-25 NOTE — PROGRESS NOTES
Pt admitted to room 3004 as med surg over flow.  Oriented to room and call light.  Pt on 3L 02 per NC currently, and is able to get up to BR independently.  Pt aware that pulmonology is due to see her and that this RN will do a walk test for pt to see about getting home 02.  Pt verbalized understanding. Afternoon medications given and dietary called to get pt a lunch tray.  Pt denies further needs at this time.

## 2024-03-25 NOTE — DISCHARGE SUMMARY
See Combined H & P and Discharge Summary   Pt discharged in stable condition  Pt requiring 3 liters of oxygen continuously, discussed face-to face with patient regarding home oxygen. Pt stated she needed it in the past, but unable to afford it.   No smoking with oxygen    Continue Tamiflu, prednisone taper and follow-up with Dr. Duarte for EBUS. Will need to hold Plavix and aspirin per Dr. Duarte recommendations.  Discussed with Dr. Duarte and he will call and get an update on patient and further recs pending clinical course.      Lisa Gasca, APRN - CNP

## 2024-03-25 NOTE — CARE COORDINATION
Met with patient. The patient states she cannot afford the copay of oxygen. The patient works part time at [employer] - 18  hours per month. She reports she only gets $1,000 per month and if she quits she can get a Medical card, however she will not be able to pay her other bills.     Spoke with Jose/Tommy regarding home oxygen for this patient with her current insurance - [employer].     Faxed FS to Jose. Jose will check on the copay for patient to have home oxygen.     Spoke with [employer] who advised the patient has a $5,700 deductible before they will pay for anything. After the deductible is met the patient will pay 40% per of the total cost of the DME. The patient will also pay 40% of the hospitalization after meeting deductible.     CM contacted Jose/Tommy again. Jose has not had a chance tocheck on the patient yet. Jose believes he can get the patient a concentrator and a tank for $120.00 per month. If the patient needs a portable concentrator (which she will need for work) that will be an additional $10.00 to $15.00 per month.     Final cost $140.00 per month and then approximately $76.00 per month after patient meets her deductible.     JOSE advised patient of the cost. Patient in agreement. Jose calling patient to take the payment.

## 2024-03-25 NOTE — PROGRESS NOTES
Notified  about pt's home O2 situation (pt stats she has been trying to get home O2 but it's still to expensive after insurance). States she will look into it but that she's not sure there will be much of a change.

## 2024-03-25 NOTE — TELEPHONE ENCOUNTER
Kat called stated that Pt will be admitted in [facility] for COPD and would like Dr. Duarte to stop by the Pt room for a consult.  Pt is currently waiting on room

## 2024-03-25 NOTE — CARE COORDINATION
03/25/24 1433   Service Assessment   Patient Orientation Alert and Oriented;Person;Place;Situation   Cognition Alert   History Provided By Patient   Primary Caregiver Self   Support Systems Children   Patient's Healthcare Decision Maker is: Legal Next of Kin   PCP Verified by CM Yes   Last Visit to PCP Within last 6 months   Prior Functional Level Independent in ADLs/IADLs   Current Functional Level Independent in ADLs/IADLs   Can patient return to prior living arrangement Yes   Ability to make needs known: Good   Family able to assist with home care needs:   (Family will transport pt home. No other needs at home.)   Would you like for me to discuss the discharge plan with any other family members/significant others, and if so, who? No   Financial Resources Medicare  (Self purchased insurance)   Community Resources None   Discharge Planning   Type of Residence House   Living Arrangements Alone   Current Services Prior To Admission None  (Should have oxygen but cannot afford it.)   Potential Assistance Needed Durable Medical Equipment   Potential DME Needed Oxygen Therapy (Comment)  (Needs home oxygen. States cannot afford the copayment.)   DME Ordered? No   Potential Assistance Purchasing Medications No   Type of Home Care Services None   Patient expects to be discharged to: House   Follow Up Appointment: Best Day/Time    (self)   One/Two Story Residence Two story, live on first floor   Lift Chair Available No   History of falls? 0     Case Management Assessment  Initial Evaluation    Date/Time of Evaluation: 3/25/2024 2:37 PM  Assessment Completed by: Teresa Boeck, RN    If patient is discharged prior to next notation, then this note serves as note for discharge by case management.    Patient Name: Esperanza Rubalcava                   YOB: 1960  Diagnosis: Influenza B [J10.1]  Hypoxia [R09.02]  COPD exacerbation (HCC) [J44.1]  Pneumonia of right middle lobe due to infectious organism [J18.9]

## 2024-03-25 NOTE — PROGRESS NOTES
Pulse oximetry assessment   89% at rest on room air (if 88% or less, skip next steps).  84% while ambulating on room air.  92% at rest on 3 LPM.  91% while ambulating on 3 LPM.

## 2024-03-25 NOTE — PROGRESS NOTES
Patient ED Hold to room 19 per wheelchair. Patient alert and oriented x 4, up independently. Patient assisted in bed, kept comfortable, side rails secured, bed in lowest position and bed locked on. Patient oriented to room and unit setting. Call light instructed and put in reach. Admission assessment done. All fall precautions implemented. All needs attended. Electronically signed by Tanisha Bella RN on 3/24/2024 at 8:46 PM

## 2024-03-25 NOTE — PROGRESS NOTES
Pt very anxious and ready to discharge. RN informed pt that she has orders to go upstairs, just waiting on a room to be assigned. Pt states there is no use in sending her upstairs because she isn't staying. RN informed her that she is pending a pulmonology consult still. Consult called. RN notified provider that pt is talking about leaving.

## 2024-03-25 NOTE — H&P
Hospital Medicine History & Physical      PCP: Solitario Blackwell MD    Date of Admission: 3/24/2024    Date of Service: Pt seen/examined on 03/25/24     Chief Complaint:    Chief Complaint   Patient presents with    Shortness of Breath     Patient reports she is having trouble breathing. States she has a \"severe asthma attack\" a couple weeks ago and been SOB, cough and not feeling well since. 02 83% RA in triage, does not have 02 at home.          History Of Present Illness:      The patient is a 63 y.o. female with PMH of asthma, CAD, depression, DM, HTN, COPD and HLD who presented to [facility] ED with complaint of SOB. Pt stated she has not felt well since last week.  She has progressive shortness of breath and stated she did run a fever on Saturday.  Productive cough with white thick sputum.  She is not vaccinated for the flu.  In ED she was hypoxic and placed on 3 liters of oxygen.  Pt stated she was told she needed oxygen in the past and was unable to afford it.  She continues to smoke. She has a lung nodule that is being followed by Dr. Duarte, recent biopsy in 12/23. Pt admitted for further care, pulmonary consulted     Past Medical History:        Diagnosis Date    Asthma     CAD (coronary artery disease)     Depression     Diabetes mellitus (HCC)     HTN     Hyperglycemia     Hyperlipidemia        Past Surgical History:        Procedure Laterality Date    BREAST BIOPSY      BRONCHOSCOPY  8/21/2023    BRONCHOSCOPY/TRANSBRONCHIAL NEEDLE BIOPSY ROBOTIC performed by Gilberto Duarte MD at [facility] ENDOSCOPY    BRONCHOSCOPY N/A 8/21/2023    BRONCHOSCOPY BRUSHINGS ROBOTIC performed by Gilberto Duarte MD at [facility] ENDOSCOPY    BRONCHOSCOPY N/A 8/21/2023    BRONCHOSCOPY ALVEOLAR LAVAGE ROBOTIC performed by Gilberto Duarte MD at [facility] ENDOSCOPY    BRONCHOSCOPY  8/21/2023    BRONCHOSCOPY/TRANSBRONCHIAL NEEDLE BIOPSY performed by Gilberto Duarte MD at [facility] ENDOSCOPY    BRONCHOSCOPY  8/21/2023    BRONCHOSCOPY ENDOBRONCHIAL

## 2024-03-26 NOTE — PROGRESS NOTES
Patient provided a COPD Educational Folder that includes the following materials:     [x]  [employer] Booklet: Managing your COPD  [x]  ALA: Getting the Most Out of Medication Delivery Devices  [x]  ALA: My COPD Action Plan  [x]  Better Breathers Club: [facility]   [x]  Smoking Cessation Classes  [x]  Outpatient Spiritual Care Services  [x]  Magnet: Signs of COPD    PATIENT/CAREGIVER TEACHING:   Level of patient/caregiver understanding able to:   [x] Verbalize understanding   [] Demonstrate understanding       [] Teach back        [] Needs reinforcement     []  Other:     COPD ASSESSMENT TOOL (CAT):   Cough Assessment: 3   Phlegm Assessment: 4   Chest tightness:  1   Walking on an incline: 2   Home Activities: 1   Confident Leaving the Home: 0   Sleeping Soundly: 5   Have Energy: 3   Assessment Score: 19    CAT score of 19. Every day smoker for 33 years. Provided information on smoking cessation classes. Discussed outpatient resources.     Electronically signed by Charlotte Chaves RN on 3/25/2024 at 2:15 PM

## 2024-03-28 ENCOUNTER — TELEPHONE (OUTPATIENT)
Dept: FAMILY MEDICINE CLINIC | Age: 64
End: 2024-03-28

## 2024-03-28 ENCOUNTER — TELEPHONE (OUTPATIENT)
Dept: PULMONOLOGY | Age: 64
End: 2024-03-28

## 2024-03-28 LAB
BACTERIA BLD CULT ORG #2: NORMAL
BACTERIA BLD CULT: NORMAL

## 2024-03-28 NOTE — TELEPHONE ENCOUNTER
Care Transitions Initial Follow Up Call    Outreach made within 2 business days of discharge: Yes    Patient: Esperanza Rubalcava Patient : 1960   MRN: [id number]  Reason for Admission: There are no discharge diagnoses documented for the most recent discharge.  Discharge Date: 3/25/24       Spoke with: Kat Dumont, front office at [facility] took this call/tb    [facility]    [facility] Interactive Patient Contact:  Was patient able to fill all prescriptions: Yes  Was patient instructed to bring all medications to the follow-up visit: Yes  Is patient taking all medications as directed in the discharge summary? Yes  Does patient understand their discharge instructions: Yes  Does patient have questions or concerns that need addressed prior to 7-14 day follow up office visit: no    Scheduled appointment with PCP within 7-14 days    Follow Up  Future Appointments   Date Time Provider Department Center   2024 11:00 AM Daisha Zhao APRN - CNP [facility] [place] [facility]   2024  2:30 PM Gilberto Duarte MD [facility]   2024  9:00 AM Chucky Palma MD [facility] [place], Registrar

## 2024-03-28 NOTE — TELEPHONE ENCOUNTER
----- Message from Gilberto Duarte MD sent at 3/27/2024  3:50 PM EDT -----  Please schedule VV for next week with me -- Tues or Weds, [facility] f/u EBUS scheduling

## 2024-03-29 ENCOUNTER — FOLLOWUP TELEPHONE ENCOUNTER (OUTPATIENT)
Dept: ADMINISTRATIVE | Age: 64
End: 2024-03-29

## 2024-03-29 NOTE — TELEPHONE ENCOUNTER
Called patient regarding smoking cessation classes starting April 2. No Answer. Left HIPAA compliant voicemail with return phone call.     Electronically signed by Charlotte Chaves RN on 3/29/2024 at 12:47 PM

## 2024-04-01 ENCOUNTER — OFFICE VISIT (OUTPATIENT)
Dept: FAMILY MEDICINE CLINIC | Age: 64
End: 2024-04-01

## 2024-04-01 VITALS
HEART RATE: 66 BPM | DIASTOLIC BLOOD PRESSURE: 82 MMHG | WEIGHT: 171 LBS | TEMPERATURE: 97.9 F | SYSTOLIC BLOOD PRESSURE: 114 MMHG | OXYGEN SATURATION: 90 % | BODY MASS INDEX: 31.47 KG/M2 | HEIGHT: 62 IN

## 2024-04-01 DIAGNOSIS — J44.1 COPD EXACERBATION (HCC): ICD-10-CM

## 2024-04-01 DIAGNOSIS — H61.21 IMPACTED CERUMEN OF RIGHT EAR: ICD-10-CM

## 2024-04-01 DIAGNOSIS — J10.1 INFLUENZA B: ICD-10-CM

## 2024-04-01 DIAGNOSIS — Z09 HOSPITAL DISCHARGE FOLLOW-UP: Primary | ICD-10-CM

## 2024-04-01 DIAGNOSIS — J18.9 PNEUMONIA OF RIGHT MIDDLE LOBE DUE TO INFECTIOUS ORGANISM: ICD-10-CM

## 2024-04-01 DIAGNOSIS — E87.5 HYPERKALEMIA: ICD-10-CM

## 2024-04-01 DIAGNOSIS — J96.21 ACUTE ON CHRONIC HYPOXIC RESPIRATORY FAILURE (HCC): ICD-10-CM

## 2024-04-01 DIAGNOSIS — Z72.0 TOBACCO ABUSE: ICD-10-CM

## 2024-04-01 DIAGNOSIS — I50.32 DIASTOLIC CHF, CHRONIC (HCC): ICD-10-CM

## 2024-04-01 RX ORDER — ALBUTEROL SULFATE 2.5 MG/3ML
2.5 SOLUTION RESPIRATORY (INHALATION) EVERY 6 HOURS PRN
Qty: 120 EACH | Refills: 3 | Status: SHIPPED | OUTPATIENT
Start: 2024-04-01

## 2024-04-01 SDOH — ECONOMIC STABILITY: FOOD INSECURITY: WITHIN THE PAST 12 MONTHS, THE FOOD YOU BOUGHT JUST DIDN'T LAST AND YOU DIDN'T HAVE MONEY TO GET MORE.: NEVER TRUE

## 2024-04-01 SDOH — ECONOMIC STABILITY: INCOME INSECURITY: HOW HARD IS IT FOR YOU TO PAY FOR THE VERY BASICS LIKE FOOD, HOUSING, MEDICAL CARE, AND HEATING?: NOT HARD AT ALL

## 2024-04-01 SDOH — ECONOMIC STABILITY: FOOD INSECURITY: WITHIN THE PAST 12 MONTHS, YOU WORRIED THAT YOUR FOOD WOULD RUN OUT BEFORE YOU GOT MONEY TO BUY MORE.: NEVER TRUE

## 2024-04-01 SDOH — ECONOMIC STABILITY: HOUSING INSECURITY
IN THE LAST 12 MONTHS, WAS THERE A TIME WHEN YOU DID NOT HAVE A STEADY PLACE TO SLEEP OR SLEPT IN A SHELTER (INCLUDING NOW)?: NO

## 2024-04-01 ASSESSMENT — PATIENT HEALTH QUESTIONNAIRE - PHQ9
4. FEELING TIRED OR HAVING LITTLE ENERGY: NOT AT ALL
SUM OF ALL RESPONSES TO PHQ9 QUESTIONS 1 & 2: 0
10. IF YOU CHECKED OFF ANY PROBLEMS, HOW DIFFICULT HAVE THESE PROBLEMS MADE IT FOR YOU TO DO YOUR WORK, TAKE CARE OF THINGS AT HOME, OR GET ALONG WITH OTHER PEOPLE: NOT DIFFICULT AT ALL
5. POOR APPETITE OR OVEREATING: NOT AT ALL
9. THOUGHTS THAT YOU WOULD BE BETTER OFF DEAD, OR OF HURTING YOURSELF: NOT AT ALL
2. FEELING DOWN, DEPRESSED OR HOPELESS: NOT AT ALL
8. MOVING OR SPEAKING SO SLOWLY THAT OTHER PEOPLE COULD HAVE NOTICED. OR THE OPPOSITE, BEING SO FIGETY OR RESTLESS THAT YOU HAVE BEEN MOVING AROUND A LOT MORE THAN USUAL: NOT AT ALL
6. FEELING BAD ABOUT YOURSELF - OR THAT YOU ARE A FAILURE OR HAVE LET YOURSELF OR YOUR FAMILY DOWN: NOT AT ALL
SUM OF ALL RESPONSES TO PHQ QUESTIONS 1-9: 0
SUM OF ALL RESPONSES TO PHQ QUESTIONS 1-9: 0
7. TROUBLE CONCENTRATING ON THINGS, SUCH AS READING THE NEWSPAPER OR WATCHING TELEVISION: NOT AT ALL
3. TROUBLE FALLING OR STAYING ASLEEP: NOT AT ALL
SUM OF ALL RESPONSES TO PHQ QUESTIONS 1-9: 0
SUM OF ALL RESPONSES TO PHQ QUESTIONS 1-9: 0
1. LITTLE INTEREST OR PLEASURE IN DOING THINGS: NOT AT ALL

## 2024-04-01 NOTE — PROGRESS NOTES
rosuvastatin (CRESTOR) 20 MG tablet Take 1 tablet by mouth once daily 90 tablet 1    TRELEGY ELLIPTA 100-62.5-25 MCG/ACT AEPB inhaler INHALE 1 PUFF ONCE DAILY 60 each 0    itraconazole (SPORANOX) 100 MG capsule 2 cap TID X 3 days then 2 cap  capsule 2    empagliflozin (JARDIANCE) 10 MG tablet Take 1 tablet by mouth daily 90 tablet 1    nitroGLYCERIN (NITROSTAT) 0.4 MG SL tablet Place 1 tablet under the tongue every 5 minutes as needed for Chest pain 25 tablet 3    metoprolol succinate (TOPROL XL) 25 MG extended release tablet Take 1 tablet by mouth once daily 90 tablet 0    furosemide (LASIX) 20 MG tablet Take 1 tablet by mouth daily 90 tablet 3    albuterol sulfate HFA (PROVENTIL;VENTOLIN;PROAIR) 108 (90 Base) MCG/ACT inhaler INHALE 2 PUFFS BY MOUTH EVERY 6 HOURS AS NEEDED FOR WHEEZING FOR SHORTNESS OF BREATH 9 g 5    guaiFENesin (MUCINEX) 600 MG extended release tablet TAKE 1 TABLET BY MOUTH TWICE DAILY FOR 15 DAYS 30 tablet 0    betamethasone valerate (VALISONE) 0.1 % cream APPLY TOPICALLY TWO TIMES A DAY AS NEEDED 45 g 5    aspirin 81 MG chewable tablet Take 1 tablet by mouth daily 30 tablet         Medications patient taking as of now reconciled against medications ordered at time of hospital discharge: Yes    A comprehensive review of systems was negative except for what was noted in the HPI.    Objective:    /82 (Site: Left Upper Arm, Position: Sitting, Cuff Size: Large Adult)   Pulse 66   Temp 97.9 °F (36.6 °C)   Ht 1.575 m (5' 2\")   Wt 77.6 kg (171 lb)   SpO2 90%   BMI 31.28 kg/m²   General Appearance: alert and oriented to person, place and time  Skin: warm and dry, no rash or erythema  Head: normocephalic and atraumatic  Eyes: pupils equal, round, and reactive to light  ENT: tympanic membrane, cerumen noted in right ear, oropharynx clear and moist with normal mucous membranes  Neck: neck supple and non tender without mass   Pulmonary/Chest: no chest wall tenderness, wheezing present,

## 2024-04-03 ENCOUNTER — PREP FOR PROCEDURE (OUTPATIENT)
Dept: PULMONOLOGY | Age: 64
End: 2024-04-03

## 2024-04-03 ENCOUNTER — SCHEDULED TELEPHONE ENCOUNTER (OUTPATIENT)
Dept: PULMONOLOGY | Age: 64
End: 2024-04-03

## 2024-04-03 DIAGNOSIS — R59.0 MEDIASTINAL LYMPHADENOPATHY: Primary | ICD-10-CM

## 2024-04-03 RX ORDER — SODIUM CHLORIDE 9 MG/ML
INJECTION, SOLUTION INTRAVENOUS PRN
Status: CANCELLED | OUTPATIENT
Start: 2024-04-03

## 2024-04-03 RX ORDER — SODIUM CHLORIDE 9 MG/ML
INJECTION, SOLUTION INTRAVENOUS CONTINUOUS
Status: CANCELLED | OUTPATIENT
Start: 2024-04-03

## 2024-04-03 RX ORDER — LIDOCAINE HYDROCHLORIDE 40 MG/ML
2.5 INJECTION, SOLUTION RETROBULBAR ONCE
Status: CANCELLED | OUTPATIENT
Start: 2024-04-03 | End: 2024-04-03

## 2024-04-03 RX ORDER — SODIUM CHLORIDE 0.9 % (FLUSH) 0.9 %
5-40 SYRINGE (ML) INJECTION PRN
Status: CANCELLED | OUTPATIENT
Start: 2024-04-03

## 2024-04-03 RX ORDER — SODIUM CHLORIDE 0.9 % (FLUSH) 0.9 %
5-40 SYRINGE (ML) INJECTION EVERY 12 HOURS SCHEDULED
Status: CANCELLED | OUTPATIENT
Start: 2024-04-03

## 2024-04-03 NOTE — PROGRESS NOTES
CC/REFERRING PROVIDER: Patient is being seen at the request of Dr. Dr. Blackwell for a consultation for Pulmonary Nodule     Interval History 4/3/24  - admitted end of March with Influenza B and COPD exacerbation.  Had CTPA.   Definitely feeling better.  Last dose of itra is today        Interval History 3/5/24  - called yesterday for urgent visit.  Several days severe wheeze and shortness of breath , had negative covid and flu testing yesterday        Interval History 23  - had TTNBx of RLL nodule that showed necrosis and rare fungal organisms.       Interval History 10/24/23  - had monarch biopsy with EBUS 23, working on tobacco cessation     Interval History 23  - had CT PET, concerned about potential chemo as her spouse and sister both  during/shortly after chemo.       PRESENTING HPI: 61 yo with h/o asthma and CAD and tobacco abuse resulted [place] [facility] [place] showed new RLL 1.8 cm Pulmonary Nodule; patient is here for evaluation and treatment of the same.  Strong family h/o lung cancer; spouse  of stage IV lung cancer.  Has cut down from 1.5 ppd to 1/2 ppd but precontemplative on quitting       reports that she has been smoking cigarettes. She has a 16.5 pack-year smoking history. She has never used smokeless tobacco.      PHYSICAL EXAM:  not currently breastfeeding.'  phone    DATA:  - PFT-   23 FEV1 0.84 L 36% TLC 7.38 L 155% % DLCO 8.49 45%    6MWT 87% RA, req 4 L 960 feet     - Imaging -   CT CHEST 3/24/24  IMPRESSION:  1. No evidence of pulmonary embolism.  2. Peribronchial thickening noted bilaterally possibly indicating acute  bronchitis.  3. Right lower lobe pulmonary nodule measuring approximately 2.2 x 1.4 cm in  axial dimensions, not significantly changed. Adjacent nodular densities in  the upper lobe appear unchanged.  Persistent right hilar and worsening  mediastinal adenopathy.  Findings remain concerning for primary lung

## 2024-04-04 ENCOUNTER — TELEPHONE (OUTPATIENT)
Dept: PULMONOLOGY | Age: 64
End: 2024-04-04

## 2024-04-04 NOTE — TELEPHONE ENCOUNTER
Patient called with message left for patient to call back to office to inform of scheduled bronch.     Bronchoscopy has been set up for 4/10/24 arrival time 12:30 am at [facility]. (Procedure time 1:30 pm)  [facility].   Nothing to eat or drink after midnight prior to the procedure.   Must have a  to bring you to and from the procedure.   Hold blood thinners as instructed prior to the procedure-hold Plavix 5 days prior.

## 2024-04-04 NOTE — TELEPHONE ENCOUNTER
Sima with Endo reached out stating that bronch can't be done on 4/11/24 as requested.  4/10/24 at 12:30 pm is available.  Will need to notify pt of change of bronch date/time.

## 2024-04-05 NOTE — TELEPHONE ENCOUNTER
Pt called back and was informed, but pt states that its too late in the day for her to have a bronch.  Pt would like to r/s.     Spoke with Sima in endo and rescheduled bronch for 4/16/24 at 7:30 am, arrival time 6:30 am.  Pt was informed of date/time/prep with verbal understanding.

## 2024-04-08 ENCOUNTER — ANESTHESIA EVENT (OUTPATIENT)
Dept: ENDOSCOPY | Age: 64
End: 2024-04-08
Payer: COMMERCIAL

## 2024-04-08 DIAGNOSIS — J44.1 COPD EXACERBATION (HCC): ICD-10-CM

## 2024-04-08 RX ORDER — FLUTICASONE FUROATE, UMECLIDINIUM BROMIDE AND VILANTEROL TRIFENATATE 100; 62.5; 25 UG/1; UG/1; UG/1
1 POWDER RESPIRATORY (INHALATION) DAILY
Qty: 1 EACH | Refills: 2 | Status: SHIPPED | OUTPATIENT
Start: 2024-04-08 | End: 2024-07-07

## 2024-04-08 RX ORDER — ALBUTEROL SULFATE 2.5 MG/3ML
2.5 SOLUTION RESPIRATORY (INHALATION) EVERY 6 HOURS PRN
Qty: 120 EACH | Refills: 3 | Status: SHIPPED | OUTPATIENT
Start: 2024-04-08

## 2024-04-08 RX ORDER — FLUTICASONE FUROATE, UMECLIDINIUM BROMIDE AND VILANTEROL TRIFENATATE 100; 62.5; 25 UG/1; UG/1; UG/1
1 POWDER RESPIRATORY (INHALATION) DAILY
Qty: 60 EACH | Refills: 0 | Status: SHIPPED | OUTPATIENT
Start: 2024-04-08 | End: 2024-04-09

## 2024-04-16 ENCOUNTER — ANESTHESIA (OUTPATIENT)
Dept: ENDOSCOPY | Age: 64
End: 2024-04-16
Payer: COMMERCIAL

## 2024-04-16 ENCOUNTER — HOSPITAL ENCOUNTER (OUTPATIENT)
Age: 64
Discharge: HOME | End: 2024-04-16
Attending: INTERNAL MEDICINE | Admitting: INTERNAL MEDICINE
Payer: COMMERCIAL

## 2024-04-16 VITALS
WEIGHT: 175 LBS | HEIGHT: 62 IN | HEART RATE: 104 BPM | BODY MASS INDEX: 32.2 KG/M2 | TEMPERATURE: 97.5 F | DIASTOLIC BLOOD PRESSURE: 133 MMHG | SYSTOLIC BLOOD PRESSURE: 158 MMHG | OXYGEN SATURATION: 96 % | RESPIRATION RATE: 27 BRPM

## 2024-04-16 PROBLEM — T17.500A MUCUS PLUGGING OF BRONCHI: Status: ACTIVE | Noted: 2024-04-16

## 2024-04-16 LAB
APPEARANCE BRONCH: ABNORMAL
CLOT SPEC QL: ABNORMAL
COLOR BRONCH: ABNORMAL
GLUCOSE BLD-MCNC: 143 MG/DL (ref 70–99)
GLUCOSE BLD-MCNC: 158 MG/DL (ref 70–99)
LYMPHOCYTES NFR BRONCH MANUAL: 1 % (ref 5–10)
MACROPHAGES NFR BRONCH MANUAL: 39 % (ref 90–95)
NEUTROPHILS NFR BRONCH MANUAL: 60 % (ref 5–10)
NUC CELL # BRONCH MANUAL: 166 /CUMM
PERFORMED ON: ABNORMAL
PERFORMED ON: ABNORMAL
RBC # FLD MANUAL: 7100 /CUMM
TOTAL CELLS COUNTED BRONCH: 100

## 2024-04-16 PROCEDURE — 6360000002 HC RX W HCPCS: Performed by: ANESTHESIOLOGY

## 2024-04-16 PROCEDURE — 3609020000 HC BRONCHOSCOPY W/EBUS FNA: Performed by: INTERNAL MEDICINE

## 2024-04-16 PROCEDURE — 31624 DX BRONCHOSCOPE/LAVAGE: CPT | Performed by: INTERNAL MEDICINE

## 2024-04-16 PROCEDURE — 88177 CYTP FNA EVAL EA ADDL: CPT

## 2024-04-16 PROCEDURE — 87205 SMEAR GRAM STAIN: CPT

## 2024-04-16 PROCEDURE — 87116 MYCOBACTERIA CULTURE: CPT

## 2024-04-16 PROCEDURE — 87070 CULTURE OTHR SPECIMN AEROBIC: CPT

## 2024-04-16 PROCEDURE — 7100000011 HC PHASE II RECOVERY - ADDTL 15 MIN: Performed by: INTERNAL MEDICINE

## 2024-04-16 PROCEDURE — 7100000001 HC PACU RECOVERY - ADDTL 15 MIN: Performed by: INTERNAL MEDICINE

## 2024-04-16 PROCEDURE — 88173 CYTOPATH EVAL FNA REPORT: CPT

## 2024-04-16 PROCEDURE — 3700000000 HC ANESTHESIA ATTENDED CARE: Performed by: INTERNAL MEDICINE

## 2024-04-16 PROCEDURE — 87102 FUNGUS ISOLATION CULTURE: CPT

## 2024-04-16 PROCEDURE — 88312 SPECIAL STAINS GROUP 1: CPT

## 2024-04-16 PROCEDURE — 2500000003 HC RX 250 WO HCPCS: Performed by: ANESTHESIOLOGY

## 2024-04-16 PROCEDURE — 31645 BRNCHSC W/THER ASPIR 1ST: CPT | Performed by: INTERNAL MEDICINE

## 2024-04-16 PROCEDURE — 2500000003 HC RX 250 WO HCPCS: Performed by: NURSE ANESTHETIST, CERTIFIED REGISTERED

## 2024-04-16 PROCEDURE — 2709999900 HC NON-CHARGEABLE SUPPLY: Performed by: INTERNAL MEDICINE

## 2024-04-16 PROCEDURE — 3609011200 HC BRONCHOSCOPY W/TRANSBRONCL NDL ASPIR BX EA ADDL LOBE: Performed by: INTERNAL MEDICINE

## 2024-04-16 PROCEDURE — 3609010800 HC BRONCHOSCOPY ALVEOLAR LAVAGE: Performed by: INTERNAL MEDICINE

## 2024-04-16 PROCEDURE — 88112 CYTOPATH CELL ENHANCE TECH: CPT

## 2024-04-16 PROCEDURE — 88305 TISSUE EXAM BY PATHOLOGIST: CPT

## 2024-04-16 PROCEDURE — 31652 BRONCH EBUS SAMPLNG 1/2 NODE: CPT | Performed by: INTERNAL MEDICINE

## 2024-04-16 PROCEDURE — 7100000010 HC PHASE II RECOVERY - FIRST 15 MIN: Performed by: INTERNAL MEDICINE

## 2024-04-16 PROCEDURE — 88185 FLOWCYTOMETRY/TC ADD-ON: CPT

## 2024-04-16 PROCEDURE — 3609011900 HC BRONCHOSCOPY NEEDLE BX TRACHEA MAIN STEM&/BRON: Performed by: INTERNAL MEDICINE

## 2024-04-16 PROCEDURE — 87206 SMEAR FLUORESCENT/ACID STAI: CPT

## 2024-04-16 PROCEDURE — 6360000002 HC RX W HCPCS: Performed by: NURSE ANESTHETIST, CERTIFIED REGISTERED

## 2024-04-16 PROCEDURE — 88184 FLOWCYTOMETRY/ TC 1 MARKER: CPT

## 2024-04-16 PROCEDURE — 3700000001 HC ADD 15 MINUTES (ANESTHESIA): Performed by: INTERNAL MEDICINE

## 2024-04-16 PROCEDURE — 88172 CYTP DX EVAL FNA 1ST EA SITE: CPT

## 2024-04-16 PROCEDURE — 7100000000 HC PACU RECOVERY - FIRST 15 MIN: Performed by: INTERNAL MEDICINE

## 2024-04-16 PROCEDURE — 89051 BODY FLUID CELL COUNT: CPT

## 2024-04-16 PROCEDURE — 2580000003 HC RX 258: Performed by: NURSE ANESTHETIST, CERTIFIED REGISTERED

## 2024-04-16 PROCEDURE — 2720000010 HC SURG SUPPLY STERILE: Performed by: INTERNAL MEDICINE

## 2024-04-16 RX ORDER — DEXAMETHASONE SODIUM PHOSPHATE 10 MG/ML
INJECTION INTRAMUSCULAR; INTRAVENOUS PRN
Status: DISCONTINUED | OUTPATIENT
Start: 2024-04-16 | End: 2024-04-16 | Stop reason: SDUPTHER

## 2024-04-16 RX ORDER — GLYCOPYRROLATE 0.2 MG/ML
INJECTION INTRAMUSCULAR; INTRAVENOUS PRN
Status: DISCONTINUED | OUTPATIENT
Start: 2024-04-16 | End: 2024-04-16 | Stop reason: SDUPTHER

## 2024-04-16 RX ORDER — LIDOCAINE HYDROCHLORIDE 40 MG/ML
2.5 INJECTION, SOLUTION RETROBULBAR ONCE
Status: DISCONTINUED | OUTPATIENT
Start: 2024-04-16 | End: 2024-04-16 | Stop reason: HOSPADM

## 2024-04-16 RX ORDER — SODIUM CHLORIDE 9 MG/ML
INJECTION, SOLUTION INTRAVENOUS CONTINUOUS
Status: DISCONTINUED | OUTPATIENT
Start: 2024-04-16 | End: 2024-04-16 | Stop reason: HOSPADM

## 2024-04-16 RX ORDER — SODIUM CHLORIDE 9 MG/ML
INJECTION, SOLUTION INTRAVENOUS PRN
Status: DISCONTINUED | OUTPATIENT
Start: 2024-04-16 | End: 2024-04-16 | Stop reason: HOSPADM

## 2024-04-16 RX ORDER — MEPERIDINE HYDROCHLORIDE 25 MG/ML
12.5 INJECTION INTRAMUSCULAR; INTRAVENOUS; SUBCUTANEOUS EVERY 5 MIN PRN
Status: DISCONTINUED | OUTPATIENT
Start: 2024-04-16 | End: 2024-04-16 | Stop reason: HOSPADM

## 2024-04-16 RX ORDER — LIDOCAINE HYDROCHLORIDE 20 MG/ML
INJECTION, SOLUTION INFILTRATION; PERINEURAL PRN
Status: DISCONTINUED | OUTPATIENT
Start: 2024-04-16 | End: 2024-04-16 | Stop reason: SDUPTHER

## 2024-04-16 RX ORDER — ROCURONIUM BROMIDE 10 MG/ML
INJECTION, SOLUTION INTRAVENOUS PRN
Status: DISCONTINUED | OUTPATIENT
Start: 2024-04-16 | End: 2024-04-16 | Stop reason: SDUPTHER

## 2024-04-16 RX ORDER — SODIUM CHLORIDE, SODIUM LACTATE, POTASSIUM CHLORIDE, CALCIUM CHLORIDE 600; 310; 30; 20 MG/100ML; MG/100ML; MG/100ML; MG/100ML
INJECTION, SOLUTION INTRAVENOUS CONTINUOUS PRN
Status: DISCONTINUED | OUTPATIENT
Start: 2024-04-16 | End: 2024-04-16 | Stop reason: SDUPTHER

## 2024-04-16 RX ORDER — PROPOFOL 10 MG/ML
INJECTION, EMULSION INTRAVENOUS PRN
Status: DISCONTINUED | OUTPATIENT
Start: 2024-04-16 | End: 2024-04-16 | Stop reason: SDUPTHER

## 2024-04-16 RX ORDER — SODIUM CHLORIDE 0.9 % (FLUSH) 0.9 %
5-40 SYRINGE (ML) INJECTION EVERY 12 HOURS SCHEDULED
Status: DISCONTINUED | OUTPATIENT
Start: 2024-04-16 | End: 2024-04-16 | Stop reason: HOSPADM

## 2024-04-16 RX ORDER — OXYCODONE HYDROCHLORIDE 5 MG/1
10 TABLET ORAL PRN
Status: DISCONTINUED | OUTPATIENT
Start: 2024-04-16 | End: 2024-04-16 | Stop reason: HOSPADM

## 2024-04-16 RX ORDER — ONDANSETRON 2 MG/ML
INJECTION INTRAMUSCULAR; INTRAVENOUS PRN
Status: DISCONTINUED | OUTPATIENT
Start: 2024-04-16 | End: 2024-04-16 | Stop reason: SDUPTHER

## 2024-04-16 RX ORDER — NALOXONE HYDROCHLORIDE 0.4 MG/ML
INJECTION, SOLUTION INTRAMUSCULAR; INTRAVENOUS; SUBCUTANEOUS PRN
Status: DISCONTINUED | OUTPATIENT
Start: 2024-04-16 | End: 2024-04-16 | Stop reason: HOSPADM

## 2024-04-16 RX ORDER — SODIUM CHLORIDE, SODIUM LACTATE, POTASSIUM CHLORIDE, CALCIUM CHLORIDE 600; 310; 30; 20 MG/100ML; MG/100ML; MG/100ML; MG/100ML
INJECTION, SOLUTION INTRAVENOUS CONTINUOUS
Status: DISCONTINUED | OUTPATIENT
Start: 2024-04-16 | End: 2024-04-16 | Stop reason: HOSPADM

## 2024-04-16 RX ORDER — FAMOTIDINE 10 MG/ML
20 INJECTION, SOLUTION INTRAVENOUS ONCE
Status: COMPLETED | OUTPATIENT
Start: 2024-04-16 | End: 2024-04-16

## 2024-04-16 RX ORDER — OXYCODONE HYDROCHLORIDE 5 MG/1
5 TABLET ORAL PRN
Status: DISCONTINUED | OUTPATIENT
Start: 2024-04-16 | End: 2024-04-16 | Stop reason: HOSPADM

## 2024-04-16 RX ORDER — ONDANSETRON 2 MG/ML
4 INJECTION INTRAMUSCULAR; INTRAVENOUS
Status: DISCONTINUED | OUTPATIENT
Start: 2024-04-16 | End: 2024-04-16 | Stop reason: HOSPADM

## 2024-04-16 RX ORDER — SODIUM CHLORIDE 0.9 % (FLUSH) 0.9 %
5-40 SYRINGE (ML) INJECTION PRN
Status: DISCONTINUED | OUTPATIENT
Start: 2024-04-16 | End: 2024-04-16 | Stop reason: HOSPADM

## 2024-04-16 RX ADMIN — HYDROCORTISONE SODIUM SUCCINATE 50 MG: 100 INJECTION, POWDER, FOR SOLUTION INTRAMUSCULAR; INTRAVENOUS at 06:56

## 2024-04-16 RX ADMIN — ROCURONIUM BROMIDE 40 MG: 10 INJECTION, SOLUTION INTRAVENOUS at 07:48

## 2024-04-16 RX ADMIN — LIDOCAINE HYDROCHLORIDE 60 MG: 20 INJECTION, SOLUTION INFILTRATION; PERINEURAL at 07:48

## 2024-04-16 RX ADMIN — DEXAMETHASONE SODIUM PHOSPHATE 10 MG: 10 INJECTION INTRAMUSCULAR; INTRAVENOUS at 08:20

## 2024-04-16 RX ADMIN — GLYCOPYRROLATE 0.1 MG: 0.2 INJECTION, SOLUTION INTRAMUSCULAR; INTRAVENOUS at 07:48

## 2024-04-16 RX ADMIN — ONDANSETRON HYDROCHLORIDE 4 MG: 2 INJECTION, SOLUTION INTRAMUSCULAR; INTRAVENOUS at 08:20

## 2024-04-16 RX ADMIN — FAMOTIDINE 20 MG: 10 INJECTION, SOLUTION INTRAVENOUS at 06:56

## 2024-04-16 RX ADMIN — SUGAMMADEX 200 MG: 100 INJECTION, SOLUTION INTRAVENOUS at 08:55

## 2024-04-16 RX ADMIN — PROPOFOL 130 MG: 10 INJECTION, EMULSION INTRAVENOUS at 07:48

## 2024-04-16 RX ADMIN — SODIUM CHLORIDE, POTASSIUM CHLORIDE, SODIUM LACTATE AND CALCIUM CHLORIDE: 600; 310; 30; 20 INJECTION, SOLUTION INTRAVENOUS at 07:45

## 2024-04-16 ASSESSMENT — LIFESTYLE VARIABLES: SMOKING_STATUS: 1

## 2024-04-16 ASSESSMENT — PAIN - FUNCTIONAL ASSESSMENT
PAIN_FUNCTIONAL_ASSESSMENT: 0-10
PAIN_FUNCTIONAL_ASSESSMENT: 0-10

## 2024-04-16 ASSESSMENT — ENCOUNTER SYMPTOMS: SHORTNESS OF BREATH: 1

## 2024-04-16 ASSESSMENT — PAIN SCALES - GENERAL
PAINLEVEL_OUTOF10: 0
PAINLEVEL_OUTOF10: 0

## 2024-04-16 ASSESSMENT — PAIN DESCRIPTION - DESCRIPTORS: DESCRIPTORS: OTHER (COMMENT)

## 2024-04-16 NOTE — DISCHARGE INSTRUCTIONS
changes in your health, and be sure to contact your doctor if you have any problems.     Where can you learn more?  Go to https://www.[employer].net/patientEd and enter S288 to learn more about \"Bronchoscopy: What to Expect at Home.\"  Current as of: March 9, 2022               Content Version: 13.5  © 2006-2022 [employer].   Care instructions adapted under license by [employer]. If you have questions about a medical condition or this instruction, always ask your healthcare professional. [employer] disclaims any warranty or liability for your use of this information.   ANESTHESIA DISCHARGE INSTRUCTIONS    You are under the influence of drugs- do not drink alcohol, drive a car, operate machinery(such as power tools, kitchen appliances, etc), sign legal documents, or make any important decisions for 24 hours (or while on pain medications).   Children should not ride bikes or skate boards or play on gym sets  for 24 hours after surgery.  A responsible adult should be with you for 24 hours.  Rest at home today- increase activity as tolerated.  Progress slowly to a regular diet unless your physician has instructed you otherwise. Drink plenty of water.    CALL YOUR DOCTOR IF YOU:  Have moderate to severe nausea or vomiting AND are unable to hold down fluids or prescribed medications.  Have bright red bloody drainage from your dressing that won't stop oozing.  Do not get relief with your pain medication    NORMAL (POSSIBLE) SIDE EFFECTS FROM ANESTHESIA:     Confusion, temporary memory loss, delayed reaction times in the first 24 hours  Lightheadedness, dizziness, difficulty focusing, blurred vision  Nausea/vomiting can happen  Shivering, feeling cold, sore throat, cough and muscle aches should stop within 24-48 hours  Trouble urinating - call your surgeon if it has been more than 8 hrs  Bruising or soreness at the IV site - call if it remains red, firm or there is drainage             FEMALES

## 2024-04-16 NOTE — H&P
Disease      Sedation plan:   Anesthesia      Post Procedure Plan   Return to same level of care   ______________________     The risks and benefits of fiberoptic bronchoscopy were specifically discussed, including the goal of obtaining a diagnosis, the risks of bleeding, infection, pneumothorax, lung collapse, hospitalization and death.  We specifically discussed the potential for biopsy and complications related to biopsy.  We also discussed the risks of sedation/anesthesia. It is my assessment that the risk of the invasive procedure is outweighed by the benefit.  Patient was counseled regarding the recommended procedure, alternatives to the procedure, and possible consequences of not having any evaluation performed.      rectal

## 2024-04-16 NOTE — ANESTHESIA POSTPROCEDURE EVALUATION
Department of Anesthesiology  Postprocedure Note    Patient: Esperanza Rubalcava  MRN: [id number]  YOB: 1960  Date of evaluation: 4/16/2024    Procedure Summary       Date: 04/16/24 Room / Location: [address]    Anesthesia Start: 0745 Anesthesia Stop: 0910    Procedures:       BRONCHOSCOPY ENDOBRONCHIAL ULTRASOUND FINE NEEDLE ASPIRATION      BRONCHOSCOPY ALVEOLAR LAVAGE Diagnosis:       Pulmonary nodule      (Pulmonary nodule [R91.1])    Surgeons: Gilberto Duarte MD Responsible Provider: Raphael Arnett MD    Anesthesia Type: general ASA Status: 3            Anesthesia Type: No value filed.    June Phase I: June Score: 9    June Phase II:      Anesthesia Post Evaluation    Patient location during evaluation: bedside  Patient participation: complete - patient participated  Level of consciousness: awake and alert  Airway patency: patent  Nausea & Vomiting: no nausea  Cardiovascular status: hemodynamically stable  Respiratory status: acceptable  Hydration status: euvolemic  Pain management: adequate      Vitals:    04/16/24 0935 04/16/24 0940 04/16/24 0945 04/16/24 0950   BP: (!) 157/80  (!) 151/85    Pulse: 95 87 80 89   Resp: 21 15 14 19   Temp: 97.5 °F (36.4 °C)      TempSrc: Infrared      SpO2: 94% 93% 98% 98%   Weight:       Height:          No notable events documented.

## 2024-04-16 NOTE — ANESTHESIA PRE PROCEDURE
Department of Anesthesiology  Preprocedure Note       Name:  Esperanza Rubalcava   Age:  63 y.o.  :  1960                                          MRN:  [id number]         Date:  2024      Surgeon: Surgeon(s):  Gilberto Duarte MD    Procedure: Procedure(s):  EBUS W/ANES. (7:30)    Medications prior to admission:   Prior to Admission medications    Medication Sig Start Date End Date Taking? Authorizing Provider   fluticasone-umeclidin-vilant (TRELEGY ELLIPTA) 100-62.5-25 MCG/ACT AEPB inhaler Inhale 1 puff into the lungs daily 24  Susannah Prado APRN - CNP   albuterol (PROVENTIL) (2.5 MG/3ML) 0.083% nebulizer solution Take 3 mLs by nebulization every 6 hours as needed for Wheezing or Shortness of Breath 24   Susannah Prado APRN - CNP   glipiZIDE (GLUCOTROL XL) 2.5 MG extended release tablet Take 1 tablet by mouth once daily 3/12/24   Solitario Blackwell MD   clopidogrel (PLAVIX) 75 MG tablet Take 1 tablet by mouth once daily 24   Chucky Palma MD   rosuvastatin (CRESTOR) 20 MG tablet Take 1 tablet by mouth once daily 24   Chucky Palma MD   itraconazole (SPORANOX) 100 MG capsule 2 cap TID X 3 days then 2 cap BID 23   Gilberto Duarte MD   empagliflozin (JARDIANCE) 10 MG tablet Take 1 tablet by mouth daily 23   Chucky Palma MD   nitroGLYCERIN (NITROSTAT) 0.4 MG SL tablet Place 1 tablet under the tongue every 5 minutes as needed for Chest pain 23   Chucky Palma MD   metoprolol succinate (TOPROL XL) 25 MG extended release tablet Take 1 tablet by mouth once daily 23   Shayla Russo APRN - CNP   furosemide (LASIX) 20 MG tablet Take 1 tablet by mouth daily 23   Solitario Blackwell MD   albuterol sulfate HFA (PROVENTIL;VENTOLIN;PROAIR) 108 (90 Base) MCG/ACT inhaler INHALE 2 PUFFS BY MOUTH EVERY 6 HOURS AS NEEDED FOR WHEEZING FOR SHORTNESS OF BREATH 23   Solitario Blackwell MD   guaiFENesin (MUCINEX) 600 MG extended release tablet TAKE 1 TABLET

## 2024-04-17 LAB
ACID FAST STN SPEC QL: NORMAL
ACID FAST STN SPEC QL: NORMAL
LOEFFLER MB STN SPEC: NORMAL
LOEFFLER MB STN SPEC: NORMAL

## 2024-04-19 ENCOUNTER — OFFICE VISIT (OUTPATIENT)
Dept: PULMONOLOGY | Age: 64
End: 2024-04-19
Payer: COMMERCIAL

## 2024-04-19 VITALS
BODY MASS INDEX: 32.76 KG/M2 | HEART RATE: 98 BPM | WEIGHT: 178 LBS | DIASTOLIC BLOOD PRESSURE: 72 MMHG | HEIGHT: 62 IN | OXYGEN SATURATION: 88 % | SYSTOLIC BLOOD PRESSURE: 110 MMHG

## 2024-04-19 DIAGNOSIS — J44.1 COPD EXACERBATION (HCC): ICD-10-CM

## 2024-04-19 DIAGNOSIS — R59.0 MEDIASTINAL LYMPHADENOPATHY: Primary | ICD-10-CM

## 2024-04-19 PROCEDURE — G8427 DOCREV CUR MEDS BY ELIG CLIN: HCPCS | Performed by: INTERNAL MEDICINE

## 2024-04-19 PROCEDURE — 3023F SPIROM DOC REV: CPT | Performed by: INTERNAL MEDICINE

## 2024-04-19 PROCEDURE — 99214 OFFICE O/P EST MOD 30 MIN: CPT | Performed by: INTERNAL MEDICINE

## 2024-04-19 PROCEDURE — 1111F DSCHRG MED/CURRENT MED MERGE: CPT | Performed by: INTERNAL MEDICINE

## 2024-04-19 PROCEDURE — 3078F DIAST BP <80 MM HG: CPT | Performed by: INTERNAL MEDICINE

## 2024-04-19 PROCEDURE — G8417 CALC BMI ABV UP PARAM F/U: HCPCS | Performed by: INTERNAL MEDICINE

## 2024-04-19 PROCEDURE — 3017F COLORECTAL CA SCREEN DOC REV: CPT | Performed by: INTERNAL MEDICINE

## 2024-04-19 PROCEDURE — 4004F PT TOBACCO SCREEN RCVD TLK: CPT | Performed by: INTERNAL MEDICINE

## 2024-04-19 PROCEDURE — 3074F SYST BP LT 130 MM HG: CPT | Performed by: INTERNAL MEDICINE

## 2024-04-19 RX ORDER — ALBUTEROL SULFATE 2.5 MG/3ML
2.5 SOLUTION RESPIRATORY (INHALATION) EVERY 6 HOURS PRN
Qty: 120 EACH | Refills: 3 | Status: SHIPPED | OUTPATIENT
Start: 2024-04-19

## 2024-04-25 LAB
FUNGUS SPEC CULT: ABNORMAL
LOEFFLER MB STN SPEC: ABNORMAL
ORGANISM: ABNORMAL

## 2024-04-29 LAB
FUNGUS SPEC CULT: NORMAL
LOEFFLER MB STN SPEC: NORMAL

## 2024-04-30 LAB
ACID FAST STN SPEC QL: NORMAL
ACID FAST STN SPEC QL: NORMAL
MYCOBACTERIUM SPEC CULT: NORMAL
MYCOBACTERIUM SPEC CULT: NORMAL

## 2024-05-06 LAB
FUNGUS SPEC CULT: NORMAL
LOEFFLER MB STN SPEC: NORMAL

## 2024-05-13 LAB
FUNGUS SPEC CULT: NORMAL
LOEFFLER MB STN SPEC: NORMAL

## 2024-05-20 LAB
FUNGUS SPEC CULT: ABNORMAL
FUNGUS SPEC CULT: NORMAL
LOEFFLER MB STN SPEC: ABNORMAL
LOEFFLER MB STN SPEC: NORMAL
ORGANISM: ABNORMAL

## 2024-05-23 DIAGNOSIS — I25.110 CORONARY ARTERY DISEASE INVOLVING NATIVE CORONARY ARTERY OF NATIVE HEART WITH UNSTABLE ANGINA PECTORIS (HCC): ICD-10-CM

## 2024-05-23 RX ORDER — FUROSEMIDE 20 MG/1
20 TABLET ORAL DAILY
Qty: 90 TABLET | Refills: 0 | Status: SHIPPED | OUTPATIENT
Start: 2024-05-23

## 2024-05-25 DIAGNOSIS — I49.3 PREMATURE VENTRICULAR CONTRACTIONS: ICD-10-CM

## 2024-05-29 RX ORDER — METOPROLOL SUCCINATE 25 MG/1
TABLET, EXTENDED RELEASE ORAL
Qty: 90 TABLET | Refills: 1 | Status: SHIPPED | OUTPATIENT
Start: 2024-05-29

## 2024-06-07 DIAGNOSIS — Z12.31 SCREENING MAMMOGRAM FOR BREAST CANCER: Primary | ICD-10-CM

## 2024-06-17 NOTE — PROGRESS NOTES
Chronic respiratory failure with hypoxia (HCC)    Type 2 diabetes mellitus without complication, without long-term current use of insulin (HCC)    Shortness of breath    Pulmonary nodule    Palpitations    COPD exacerbation (HCC)    Acute on chronic hypoxic respiratory failure (HCC)    Influenza B    Pneumonia of right middle lobe due to infectious organism    Mediastinal lymphadenopathy    Mucus plugging of bronchi       PLAN:  Continue Jardiance 10 mg daily for MI/CVA prevention in the setting of patient who is high risk with history of recurrent events and continued tobacco use  Continue ASA, clopidogrel,  metoprolol, rosuvastatin for management of coronary artery disease  Continue Lasix.  Appears euvolemic  Continue inhaled therapies.  Reinforced strict compliance with oxygen.  Re-establish with pulmonary  - Dr. Sahu   Continue to strongly advised complete smoking cessation.      Follow up 6 months     Scribe's attestation:  This note was scribed in the presence of Dr. Chucky Palma MD by Rocio Jett RN      The scribe’s documentation has been prepared under my direction and personally reviewed by me in its entirety.  I confirm that the note above accurately reflects all work, treatment, procedures, and medical decision making performed by me.  I, Chucky Palma MD, personally performed the services described in this documentation as scribed by  Rocio Jett RN in my presence, and it is both accurate and complete to the best of our ability.         I will address the patient's cardiac risk factors and adjusted pharmacologic treatment as needed. In addition, I have reinforced the need for patient directed risk factor modification.  All questions and concerns were addressed to the patient/family. Alternatives to my treatment were discussed.     Thank you for allowing us to participate in the care of Esperanza Rubalcava. Please call me with any questions [phone].    Chucky Palma MD, [facility]  Cardiovascular

## 2024-06-18 ENCOUNTER — OFFICE VISIT (OUTPATIENT)
Dept: CARDIOLOGY CLINIC | Age: 64
End: 2024-06-18
Payer: COMMERCIAL

## 2024-06-18 VITALS
WEIGHT: 170.8 LBS | DIASTOLIC BLOOD PRESSURE: 58 MMHG | SYSTOLIC BLOOD PRESSURE: 100 MMHG | HEART RATE: 83 BPM | BODY MASS INDEX: 31.43 KG/M2 | HEIGHT: 62 IN | OXYGEN SATURATION: 90 %

## 2024-06-18 DIAGNOSIS — Z72.0 TOBACCO ABUSE: ICD-10-CM

## 2024-06-18 DIAGNOSIS — I10 ESSENTIAL HYPERTENSION: ICD-10-CM

## 2024-06-18 DIAGNOSIS — R06.09 DYSPNEA ON EXERTION: ICD-10-CM

## 2024-06-18 DIAGNOSIS — I25.10 CORONARY ARTERY DISEASE INVOLVING NATIVE CORONARY ARTERY OF NATIVE HEART WITHOUT ANGINA PECTORIS: Primary | ICD-10-CM

## 2024-06-18 PROCEDURE — G8417 CALC BMI ABV UP PARAM F/U: HCPCS | Performed by: INTERNAL MEDICINE

## 2024-06-18 PROCEDURE — 3017F COLORECTAL CA SCREEN DOC REV: CPT | Performed by: INTERNAL MEDICINE

## 2024-06-18 PROCEDURE — 3078F DIAST BP <80 MM HG: CPT | Performed by: INTERNAL MEDICINE

## 2024-06-18 PROCEDURE — 3074F SYST BP LT 130 MM HG: CPT | Performed by: INTERNAL MEDICINE

## 2024-06-18 PROCEDURE — G8427 DOCREV CUR MEDS BY ELIG CLIN: HCPCS | Performed by: INTERNAL MEDICINE

## 2024-06-18 PROCEDURE — 4004F PT TOBACCO SCREEN RCVD TLK: CPT | Performed by: INTERNAL MEDICINE

## 2024-06-18 PROCEDURE — 99214 OFFICE O/P EST MOD 30 MIN: CPT | Performed by: INTERNAL MEDICINE

## 2024-06-18 RX ORDER — ROSUVASTATIN CALCIUM 20 MG/1
20 TABLET, COATED ORAL DAILY
Qty: 90 TABLET | Refills: 2 | Status: SHIPPED | OUTPATIENT
Start: 2024-06-18

## 2024-06-18 RX ORDER — CLOPIDOGREL BISULFATE 75 MG/1
75 TABLET ORAL DAILY
Qty: 90 TABLET | Refills: 2 | Status: SHIPPED | OUTPATIENT
Start: 2024-06-18

## 2024-06-18 NOTE — PATIENT INSTRUCTIONS
Continue to strongly advised complete smoking cessation.  No change in medications today  No cardiac testing warranted at this time

## 2024-06-26 DIAGNOSIS — J45.41 MODERATE PERSISTENT ASTHMA WITH ACUTE EXACERBATION: ICD-10-CM

## 2024-06-27 RX ORDER — ALBUTEROL SULFATE 90 UG/1
AEROSOL, METERED RESPIRATORY (INHALATION)
Qty: 3 EACH | Refills: 1 | Status: SHIPPED | OUTPATIENT
Start: 2024-06-27

## 2024-07-01 ENCOUNTER — HOSPITAL ENCOUNTER (OUTPATIENT)
Dept: MAMMOGRAPHY | Age: 64
Discharge: HOME | End: 2024-07-01
Attending: FAMILY MEDICINE
Payer: COMMERCIAL

## 2024-07-01 DIAGNOSIS — Z12.31 SCREENING MAMMOGRAM FOR BREAST CANCER: ICD-10-CM

## 2024-07-01 PROCEDURE — 77063 BREAST TOMOSYNTHESIS BI: CPT

## 2024-07-17 ENCOUNTER — TELEPHONE (OUTPATIENT)
Dept: FAMILY MEDICINE CLINIC | Age: 64
End: 2024-07-17

## 2024-07-17 ENCOUNTER — OFFICE VISIT (OUTPATIENT)
Dept: FAMILY MEDICINE CLINIC | Age: 64
End: 2024-07-17
Payer: COMMERCIAL

## 2024-07-17 VITALS
HEART RATE: 103 BPM | TEMPERATURE: 97.9 F | SYSTOLIC BLOOD PRESSURE: 117 MMHG | OXYGEN SATURATION: 87 % | WEIGHT: 172 LBS | DIASTOLIC BLOOD PRESSURE: 76 MMHG | BODY MASS INDEX: 31.45 KG/M2

## 2024-07-17 DIAGNOSIS — H60.333 ACUTE SWIMMER'S EAR OF BOTH SIDES: ICD-10-CM

## 2024-07-17 DIAGNOSIS — J45.41 MODERATE PERSISTENT ASTHMA WITH ACUTE EXACERBATION: ICD-10-CM

## 2024-07-17 DIAGNOSIS — Z12.11 COLON CANCER SCREENING: ICD-10-CM

## 2024-07-17 DIAGNOSIS — I10 ESSENTIAL HYPERTENSION: ICD-10-CM

## 2024-07-17 DIAGNOSIS — J96.11 CHRONIC RESPIRATORY FAILURE WITH HYPOXIA (HCC): ICD-10-CM

## 2024-07-17 DIAGNOSIS — E11.9 TYPE 2 DIABETES MELLITUS WITHOUT COMPLICATION, WITHOUT LONG-TERM CURRENT USE OF INSULIN (HCC): ICD-10-CM

## 2024-07-17 DIAGNOSIS — I25.110 CORONARY ARTERY DISEASE INVOLVING NATIVE CORONARY ARTERY OF NATIVE HEART WITH UNSTABLE ANGINA PECTORIS (HCC): Primary | ICD-10-CM

## 2024-07-17 DIAGNOSIS — H66.003 NON-RECURRENT ACUTE SUPPURATIVE OTITIS MEDIA OF BOTH EARS WITHOUT SPONTANEOUS RUPTURE OF TYMPANIC MEMBRANES: ICD-10-CM

## 2024-07-17 PROCEDURE — 36415 COLL VENOUS BLD VENIPUNCTURE: CPT | Performed by: FAMILY MEDICINE

## 2024-07-17 PROCEDURE — 4004F PT TOBACCO SCREEN RCVD TLK: CPT | Performed by: FAMILY MEDICINE

## 2024-07-17 PROCEDURE — 3017F COLORECTAL CA SCREEN DOC REV: CPT | Performed by: FAMILY MEDICINE

## 2024-07-17 PROCEDURE — 3074F SYST BP LT 130 MM HG: CPT | Performed by: FAMILY MEDICINE

## 2024-07-17 PROCEDURE — 4130F TOPICAL PREP RX AOE: CPT | Performed by: FAMILY MEDICINE

## 2024-07-17 PROCEDURE — 99214 OFFICE O/P EST MOD 30 MIN: CPT | Performed by: FAMILY MEDICINE

## 2024-07-17 PROCEDURE — G8417 CALC BMI ABV UP PARAM F/U: HCPCS | Performed by: FAMILY MEDICINE

## 2024-07-17 PROCEDURE — 2022F DILAT RTA XM EVC RTNOPTHY: CPT | Performed by: FAMILY MEDICINE

## 2024-07-17 PROCEDURE — 3078F DIAST BP <80 MM HG: CPT | Performed by: FAMILY MEDICINE

## 2024-07-17 PROCEDURE — G8427 DOCREV CUR MEDS BY ELIG CLIN: HCPCS | Performed by: FAMILY MEDICINE

## 2024-07-17 PROCEDURE — 3046F HEMOGLOBIN A1C LEVEL >9.0%: CPT | Performed by: FAMILY MEDICINE

## 2024-07-17 RX ORDER — OFLOXACIN 3 MG/ML
5 SOLUTION AURICULAR (OTIC) 2 TIMES DAILY
Qty: 10 ML | Refills: 0 | Status: SHIPPED | OUTPATIENT
Start: 2024-07-17 | End: 2024-07-27

## 2024-07-17 RX ORDER — GUAIFENESIN 600 MG/1
600 TABLET, EXTENDED RELEASE ORAL 2 TIMES DAILY
Qty: 30 TABLET | Refills: 0 | Status: CANCELLED | OUTPATIENT
Start: 2024-07-17

## 2024-07-17 RX ORDER — GUAIFENESIN 600 MG/1
1200 TABLET, EXTENDED RELEASE ORAL 2 TIMES DAILY
Qty: 120 TABLET | Refills: 5 | Status: SHIPPED | OUTPATIENT
Start: 2024-07-17

## 2024-07-17 RX ORDER — AMOXICILLIN 500 MG/1
1000 CAPSULE ORAL 2 TIMES DAILY
Qty: 40 CAPSULE | Refills: 0 | Status: SHIPPED | OUTPATIENT
Start: 2024-07-17 | End: 2024-07-27

## 2024-07-17 RX ORDER — PREDNISONE 20 MG/1
40 TABLET ORAL DAILY
Qty: 20 TABLET | Refills: 0 | Status: SHIPPED | OUTPATIENT
Start: 2024-07-17 | End: 2024-07-27

## 2024-07-17 NOTE — TELEPHONE ENCOUNTER
Patient states she was told to call back with name of medication for Dr. Blackwell to fill.       Guaifenesin 600 MG    [facility]

## 2024-07-17 NOTE — PATIENT INSTRUCTIONS
Please read the healthy family handout that you were given and share it with your family.       Please compare this printed medication list with your medications at home to be sure they are the same.  If you have any medications that are different please contact us immediately at [phone].     Also review your allergies that we have listed, these may also include medications that you have not been able to tolerate, make sure everything listed is correct. If you have any allergies that are different please contact us immediately at [phone].     You may receive a survey in the mail or by email asking about your experience during your visit today. Please complete and return to us so we know how we are serving you.

## 2024-07-17 NOTE — PROGRESS NOTES
Subjective:  Esperanza Rubalcava is here to discuss the following issues.  She has coronary artery disease with no recent chest pain pressure lightheadedness edema.  She had a recent follow-up with cardiology with no change in her treatment plan.  She has history of chronic respiratory failure with COPD and unfortunately continues to smoke.  She continues on Trelegy with albuterol and is on oxygen at home.  No worsening shortness of breath wheezing cough.  She has fully recovered from a recent bout of pneumonia.  She has diabetes with no polydipsia or polyuria.  She has bilateral ear pain with drainage.  She also has some sinus pressure pain.  No fever sweats or chills.  No vomiting or diarrhea.  She has hypertension and blood pressures have consistently been well-controlled  Social History     Tobacco Use   Smoking Status Every Day    Current packs/day: 0.50    Average packs/day: 0.5 packs/day for 33.0 years (16.5 ttl pk-yrs)    Types: Cigarettes   Smokeless Tobacco Never   Tobacco Comments    1/2 pack, per smoking hx audit in 2010 pt smoked 20 yrs, at heaviest 1 ppd, average 0.75   Allergies:     Atenolol and Ceftriaxone    Objective:  /76   Pulse (!) 103   Temp 97.9 °F (36.6 °C) (Oral)   Wt 78 kg (172 lb)   SpO2 (!) 87%   BMI 31.45 kg/m²    No acute distress, heart regular rate and rhythm without murmur, lungs clear to auscultation easy effort, abdomen nondistended, no clubbing or cyanosis bilateral erythematous ear canals with red swollen TMs    Assessment:  1. Coronary artery disease involving native coronary artery of native heart with unstable angina pectoris (HCC)    2. Chronic respiratory failure with hypoxia ([facility])    3. Type 2 diabetes mellitus without complication, without long-term current use of insulin ([facility])    4. Non-recurrent acute suppurative otitis media of both ears without spontaneous rupture of tympanic membranes    5. Acute swimmer's ear of both sides    6. Essential hypertension    7.  175.3

## 2024-07-18 LAB
ALT SERPL-CCNC: 19 U/L (ref 10–40)
ANION GAP SERPL CALCULATED.3IONS-SCNC: 11 MMOL/L (ref 3–16)
BUN SERPL-MCNC: 13 MG/DL (ref 7–20)
CALCIUM SERPL-MCNC: 9.5 MG/DL (ref 8.3–10.6)
CHLORIDE SERPL-SCNC: 103 MMOL/L (ref 99–110)
CHOLEST SERPL-MCNC: 169 MG/DL (ref 0–199)
CO2 SERPL-SCNC: 27 MMOL/L (ref 21–32)
CREAT SERPL-MCNC: 1 MG/DL (ref 0.6–1.2)
EST. AVERAGE GLUCOSE BLD GHB EST-MCNC: 139.9 MG/DL
GFR SERPLBLD CREATININE-BSD FMLA CKD-EPI: 64 ML/MIN/{1.73_M2}
GLUCOSE SERPL-MCNC: 106 MG/DL (ref 70–99)
HBA1C MFR BLD: 6.5 %
HDLC SERPL-MCNC: 43 MG/DL (ref 40–60)
LDLC SERPL CALC-MCNC: 84 MG/DL
POTASSIUM SERPL-SCNC: 4.4 MMOL/L (ref 3.5–5.1)
SODIUM SERPL-SCNC: 141 MMOL/L (ref 136–145)
TRIGL SERPL-MCNC: 210 MG/DL (ref 0–150)
VLDLC SERPL CALC-MCNC: 42 MG/DL

## 2024-07-19 ENCOUNTER — TELEPHONE (OUTPATIENT)
Dept: ADMINISTRATIVE | Age: 64
End: 2024-07-19

## 2024-07-19 NOTE — TELEPHONE ENCOUNTER
Submitted PA for EQ Mucus Relief 600MG er tablets   Via CMM (Key: [id number])  STATUS: PENDING.    Follow up done daily; if no decision with in three days we will refax.  If another three days goes by with no decision will call the insurance for status.

## 2024-07-22 NOTE — TELEPHONE ENCOUNTER
The medication is APPROVED. LETTER AVAILABLE IN MEDIA  APPROVED 07/20/2024 - 07/20/2025    If this requires a response please respond to the pool ( P [facility]X [facility] MEDICATION PRE-AUTH).      Thank you please advise patient.

## 2024-08-07 RX ORDER — FLUTICASONE FUROATE, UMECLIDINIUM BROMIDE AND VILANTEROL TRIFENATATE 100; 62.5; 25 UG/1; UG/1; UG/1
POWDER RESPIRATORY (INHALATION)
Qty: 60 EACH | Refills: 0 | Status: SHIPPED | OUTPATIENT
Start: 2024-08-07

## 2024-08-20 ENCOUNTER — OFFICE VISIT (OUTPATIENT)
Dept: PULMONOLOGY | Age: 64
End: 2024-08-20
Payer: COMMERCIAL

## 2024-08-20 ENCOUNTER — HOSPITAL ENCOUNTER (OUTPATIENT)
Dept: CT IMAGING | Age: 64
Discharge: HOME | End: 2024-08-20
Payer: COMMERCIAL

## 2024-08-20 VITALS
SYSTOLIC BLOOD PRESSURE: 128 MMHG | DIASTOLIC BLOOD PRESSURE: 80 MMHG | OXYGEN SATURATION: 92 % | BODY MASS INDEX: 32.02 KG/M2 | WEIGHT: 174 LBS | RESPIRATION RATE: 17 BRPM | HEART RATE: 81 BPM | HEIGHT: 62 IN

## 2024-08-20 DIAGNOSIS — R59.0 MEDIASTINAL LYMPHADENOPATHY: ICD-10-CM

## 2024-08-20 DIAGNOSIS — J44.9 CHRONIC OBSTRUCTIVE PULMONARY DISEASE, UNSPECIFIED COPD TYPE (HCC): Primary | ICD-10-CM

## 2024-08-20 DIAGNOSIS — I25.110 CORONARY ARTERY DISEASE INVOLVING NATIVE CORONARY ARTERY OF NATIVE HEART WITH UNSTABLE ANGINA PECTORIS (HCC): ICD-10-CM

## 2024-08-20 PROCEDURE — 3023F SPIROM DOC REV: CPT | Performed by: INTERNAL MEDICINE

## 2024-08-20 PROCEDURE — 3017F COLORECTAL CA SCREEN DOC REV: CPT | Performed by: INTERNAL MEDICINE

## 2024-08-20 PROCEDURE — 6360000004 HC RX CONTRAST MEDICATION: Performed by: INTERNAL MEDICINE

## 2024-08-20 PROCEDURE — 71260 CT THORAX DX C+: CPT

## 2024-08-20 PROCEDURE — 4004F PT TOBACCO SCREEN RCVD TLK: CPT | Performed by: INTERNAL MEDICINE

## 2024-08-20 PROCEDURE — 3074F SYST BP LT 130 MM HG: CPT | Performed by: INTERNAL MEDICINE

## 2024-08-20 PROCEDURE — G8417 CALC BMI ABV UP PARAM F/U: HCPCS | Performed by: INTERNAL MEDICINE

## 2024-08-20 PROCEDURE — G8427 DOCREV CUR MEDS BY ELIG CLIN: HCPCS | Performed by: INTERNAL MEDICINE

## 2024-08-20 PROCEDURE — 3079F DIAST BP 80-89 MM HG: CPT | Performed by: INTERNAL MEDICINE

## 2024-08-20 PROCEDURE — 99214 OFFICE O/P EST MOD 30 MIN: CPT | Performed by: INTERNAL MEDICINE

## 2024-08-20 RX ORDER — FLUTICASONE FUROATE, UMECLIDINIUM BROMIDE AND VILANTEROL TRIFENATATE 200; 62.5; 25 UG/1; UG/1; UG/1
1 POWDER RESPIRATORY (INHALATION) DAILY
Qty: 1 EACH | Refills: 0 | COMMUNITY
Start: 2024-08-20

## 2024-08-20 RX ORDER — ROFLUMILAST 250 UG/1
250 TABLET ORAL DAILY
Qty: 28 TABLET | Refills: 0 | Status: SHIPPED | OUTPATIENT
Start: 2024-08-20

## 2024-08-20 RX ORDER — FUROSEMIDE 20 MG/1
20 TABLET ORAL DAILY
Qty: 90 TABLET | Refills: 0 | Status: SHIPPED | OUTPATIENT
Start: 2024-08-20

## 2024-08-20 RX ADMIN — IOPAMIDOL 75 ML: 755 INJECTION, SOLUTION INTRAVENOUS at 07:16

## 2024-08-20 ASSESSMENT — SLEEP AND FATIGUE QUESTIONNAIRES
HOW LIKELY ARE YOU TO NOD OFF OR FALL ASLEEP WHEN YOU ARE A PASSENGER IN A CAR FOR AN HOUR WITHOUT A BREAK: MODERATE CHANCE OF DOZING
HOW LIKELY ARE YOU TO NOD OFF OR FALL ASLEEP WHILE WATCHING TV: WOULD NEVER DOZE
HOW LIKELY ARE YOU TO NOD OFF OR FALL ASLEEP WHILE LYING DOWN TO REST IN THE AFTERNOON WHEN CIRCUMSTANCES PERMIT: HIGH CHANCE OF DOZING
HOW LIKELY ARE YOU TO NOD OFF OR FALL ASLEEP WHILE SITTING AND TALKING TO SOMEONE: WOULD NEVER DOZE
ESS TOTAL SCORE: 7
HOW LIKELY ARE YOU TO NOD OFF OR FALL ASLEEP WHILE SITTING INACTIVE IN A PUBLIC PLACE: WOULD NEVER DOZE
HOW LIKELY ARE YOU TO NOD OFF OR FALL ASLEEP IN A CAR, WHILE STOPPED FOR A FEW MINUTES IN TRAFFIC: WOULD NEVER DOZE
HOW LIKELY ARE YOU TO NOD OFF OR FALL ASLEEP WHILE SITTING QUIETLY AFTER LUNCH WITHOUT ALCOHOL: WOULD NEVER DOZE
HOW LIKELY ARE YOU TO NOD OFF OR FALL ASLEEP WHILE SITTING AND READING: MODERATE CHANCE OF DOZING

## 2024-08-20 NOTE — PROGRESS NOTES
[facility]                                 Pulmonary Consult /Progress Note :                                                                Interval history     Has 40 PP per smoking history  Still smoke  Has biopsy lung nodule RLL and lymph nodes  Results Possible fungal ?   Complain of SOB and using albuterol about daily  On Trelegy     Still smoke 1/2 pack daily   '    Interval History 23  - had TTNBx of RLL nodule that showed necrosis and rare fungal organisms.       Interval History 10/24/23  - had monarch biopsy with EBUS 23, working on tobacco cessation      Interval History 23  - had CT PET, concerned about potential chemo as her spouse and sister both  during/shortly after chemo.       PRESENTING HPI: 64yo with h/o asthma and CAD and tobacco abuse resulted in LD screening CT CHEST @ [facility] [place] on 23 that showed new RLL 1.8 cm Pulmonary Nodule; patient is here for evaluation and treatment of the same.  Strong family h/o lung cancer; spouse  of stage IV lung cancer.  Has cut down from 1.5 ppd to 1/2 ppd but precontemplative on quitting         reports that she has been smoking cigarettes. She has a 16.5 pack-year smoking history. She has never used smokeless tobacco.        PHYSICAL EXAM:  Blood pressure 134/86, pulse 86, resp. rate 14, height 1.575 m (5' 2\"), weight 76.9 kg (169 lb 9.6 oz), SpO2 (!) 88 %, not currently breastfeeding.'  Constitutional:  No acute distress.   HENT:  Oropharynx is clear and moist.   Neck: No tracheal deviation present.   Cardiovascular: Normal heart sounds.  No lower extremity edema.  Pulmonary/Chest: No wheezes. No rhonchi. No rales. + decreased breath sounds.  No accessory muscle usage or stridor.   Musculoskeletal: No cyanosis. No clubbing.  Skin: Skin is warm and dry.   Psychiatric: Normal mood and affect.  Neurologic: speech fluent, alert and oriented, strength

## 2024-08-30 ENCOUNTER — TELEPHONE (OUTPATIENT)
Dept: PULMONOLOGY | Age: 64
End: 2024-08-30

## 2024-09-10 RX ORDER — FLUTICASONE FUROATE, UMECLIDINIUM BROMIDE AND VILANTEROL TRIFENATATE 100; 62.5; 25 UG/1; UG/1; UG/1
1 POWDER RESPIRATORY (INHALATION) DAILY
Qty: 60 EACH | Refills: 0 | Status: SHIPPED | OUTPATIENT
Start: 2024-09-10

## 2024-09-13 ENCOUNTER — TELEPHONE (OUTPATIENT)
Dept: PULMONOLOGY | Age: 64
End: 2024-09-13

## 2024-09-13 NOTE — TELEPHONE ENCOUNTER
Patient needs prior Authorization for medication Roflumilast 250MCG Tablets.      [id number]  Navya Rubalcava   1960

## 2024-09-17 RX ORDER — GLIPIZIDE 2.5 MG/1
2.5 TABLET, EXTENDED RELEASE ORAL DAILY
Qty: 30 TABLET | Refills: 0 | Status: SHIPPED | OUTPATIENT
Start: 2024-09-17

## 2024-10-04 DIAGNOSIS — J44.9 CHRONIC OBSTRUCTIVE PULMONARY DISEASE, UNSPECIFIED COPD TYPE (HCC): ICD-10-CM

## 2024-10-04 RX ORDER — ROFLUMILAST 250 UG/1
250 TABLET ORAL DAILY
Qty: 28 TABLET | Refills: 0 | Status: SHIPPED | OUTPATIENT
Start: 2024-10-04

## 2024-10-04 RX ORDER — FLUTICASONE FUROATE, UMECLIDINIUM BROMIDE AND VILANTEROL TRIFENATATE 100; 62.5; 25 UG/1; UG/1; UG/1
1 POWDER RESPIRATORY (INHALATION) DAILY
Qty: 60 EACH | Refills: 0 | Status: SHIPPED | OUTPATIENT
Start: 2024-10-04

## 2024-10-13 DIAGNOSIS — J44.9 CHRONIC OBSTRUCTIVE PULMONARY DISEASE, UNSPECIFIED COPD TYPE (HCC): ICD-10-CM

## 2024-10-15 RX ORDER — GLIPIZIDE 2.5 MG/1
2.5 TABLET, EXTENDED RELEASE ORAL DAILY
Qty: 30 TABLET | Refills: 3 | Status: SHIPPED | OUTPATIENT
Start: 2024-10-15

## 2024-10-16 RX ORDER — ROFLUMILAST 250 UG/1
250 TABLET ORAL DAILY
Qty: 28 TABLET | Refills: 5 | Status: SHIPPED | OUTPATIENT
Start: 2024-10-16

## 2024-10-25 ENCOUNTER — HOSPITAL ENCOUNTER (EMERGENCY)
Age: 64
Discharge: HOME | End: 2024-10-25
Payer: COMMERCIAL

## 2024-10-25 VITALS
HEIGHT: 62 IN | SYSTOLIC BLOOD PRESSURE: 107 MMHG | RESPIRATION RATE: 26 BRPM | TEMPERATURE: 98.4 F | OXYGEN SATURATION: 92 % | HEART RATE: 94 BPM | BODY MASS INDEX: 33.13 KG/M2 | WEIGHT: 180 LBS | DIASTOLIC BLOOD PRESSURE: 74 MMHG

## 2024-10-25 DIAGNOSIS — E86.0 DEHYDRATION: Primary | ICD-10-CM

## 2024-10-25 DIAGNOSIS — J44.1 COPD EXACERBATION (HCC): ICD-10-CM

## 2024-10-25 LAB
ALBUMIN SERPL-MCNC: 4.1 G/DL (ref 3.4–5)
ALBUMIN/GLOB SERPL: 1 {RATIO} (ref 1.1–2.2)
ALP SERPL-CCNC: 119 U/L (ref 40–129)
ALT SERPL-CCNC: 23 U/L (ref 10–40)
ANION GAP SERPL CALCULATED.3IONS-SCNC: 12 MMOL/L (ref 3–16)
AST SERPL-CCNC: 14 U/L (ref 15–37)
BASOPHILS # BLD: 0 K/UL (ref 0–0.2)
BASOPHILS NFR BLD: 0.4 %
BILIRUB SERPL-MCNC: 0.3 MG/DL (ref 0–1)
BUN SERPL-MCNC: 12 MG/DL (ref 7–20)
CALCIUM SERPL-MCNC: 9.2 MG/DL (ref 8.3–10.6)
CHLORIDE SERPL-SCNC: 96 MMOL/L (ref 99–110)
CO2 SERPL-SCNC: 26 MMOL/L (ref 21–32)
CREAT SERPL-MCNC: 0.9 MG/DL (ref 0.6–1.2)
DEPRECATED RDW RBC AUTO: 16.1 % (ref 12.4–15.4)
EKG ATRIAL RATE: 112 BPM
EKG DIAGNOSIS: NORMAL
EKG P AXIS: 75 DEGREES
EKG P-R INTERVAL: 158 MS
EKG Q-T INTERVAL: 326 MS
EKG QRS DURATION: 74 MS
EKG QTC CALCULATION (BAZETT): 444 MS
EKG R AXIS: 62 DEGREES
EKG T AXIS: 50 DEGREES
EKG VENTRICULAR RATE: 112 BPM
EOSINOPHIL # BLD: 0.1 K/UL (ref 0–0.6)
EOSINOPHIL NFR BLD: 1.2 %
FLUAV RNA RESP QL NAA+PROBE: NOT DETECTED
FLUBV RNA RESP QL NAA+PROBE: NOT DETECTED
GFR SERPLBLD CREATININE-BSD FMLA CKD-EPI: 72 ML/MIN/{1.73_M2}
GLUCOSE SERPL-MCNC: 146 MG/DL (ref 70–99)
HCT VFR BLD AUTO: 51.9 % (ref 36–48)
HGB BLD-MCNC: 17.3 G/DL (ref 12–16)
LYMPHOCYTES # BLD: 1.2 K/UL (ref 1–5.1)
LYMPHOCYTES NFR BLD: 13.1 %
MCH RBC QN AUTO: 29.5 PG (ref 26–34)
MCHC RBC AUTO-ENTMCNC: 33.3 G/DL (ref 31–36)
MCV RBC AUTO: 88.5 FL (ref 80–100)
MONOCYTES # BLD: 0.8 K/UL (ref 0–1.3)
MONOCYTES NFR BLD: 9 %
NEUTROPHILS # BLD: 7.2 K/UL (ref 1.7–7.7)
NEUTROPHILS NFR BLD: 76.3 %
NT-PROBNP SERPL-MCNC: 117 PG/ML (ref 0–124)
PLATELET # BLD AUTO: 329 K/UL (ref 135–450)
PMV BLD AUTO: 7.7 FL (ref 5–10.5)
POTASSIUM SERPL-SCNC: 3.7 MMOL/L (ref 3.5–5.1)
PROT SERPL-MCNC: 8.3 G/DL (ref 6.4–8.2)
RBC # BLD AUTO: 5.87 M/UL (ref 4–5.2)
REASON FOR REJECTION: NORMAL
REJECTED TEST: NORMAL
SARS-COV-2 RNA RESP QL NAA+PROBE: NOT DETECTED
SODIUM SERPL-SCNC: 134 MMOL/L (ref 136–145)
TROPONIN, HIGH SENSITIVITY: 7 NG/L (ref 0–14)
TROPONIN, HIGH SENSITIVITY: <6 NG/L (ref 0–14)
WBC # BLD AUTO: 9.4 K/UL (ref 4–11)

## 2024-10-25 PROCEDURE — 85025 COMPLETE CBC W/AUTO DIFF WBC: CPT

## 2024-10-25 PROCEDURE — 84484 ASSAY OF TROPONIN QUANT: CPT

## 2024-10-25 PROCEDURE — 96374 THER/PROPH/DIAG INJ IV PUSH: CPT

## 2024-10-25 PROCEDURE — 93010 ELECTROCARDIOGRAM REPORT: CPT | Performed by: INTERNAL MEDICINE

## 2024-10-25 PROCEDURE — 80053 COMPREHEN METABOLIC PANEL: CPT

## 2024-10-25 PROCEDURE — 99284 EMERGENCY DEPT VISIT MOD MDM: CPT

## 2024-10-25 PROCEDURE — 93005 ELECTROCARDIOGRAM TRACING: CPT | Performed by: EMERGENCY MEDICINE

## 2024-10-25 PROCEDURE — 83880 ASSAY OF NATRIURETIC PEPTIDE: CPT

## 2024-10-25 PROCEDURE — 87636 SARSCOV2 & INF A&B AMP PRB: CPT

## 2024-10-25 PROCEDURE — 36415 COLL VENOUS BLD VENIPUNCTURE: CPT

## 2024-10-25 PROCEDURE — 6360000002 HC RX W HCPCS: Performed by: PHYSICIAN ASSISTANT

## 2024-10-25 RX ORDER — ONDANSETRON 2 MG/ML
4 INJECTION INTRAMUSCULAR; INTRAVENOUS EVERY 30 MIN PRN
Status: DISCONTINUED | OUTPATIENT
Start: 2024-10-25 | End: 2024-10-25 | Stop reason: HOSPADM

## 2024-10-25 RX ADMIN — ONDANSETRON 4 MG: 2 INJECTION INTRAMUSCULAR; INTRAVENOUS at 11:12

## 2024-10-25 ASSESSMENT — PAIN - FUNCTIONAL ASSESSMENT: PAIN_FUNCTIONAL_ASSESSMENT: NONE - DENIES PAIN

## 2024-10-25 NOTE — ED TRIAGE NOTES
Patient presents to the ED from home with c/o increasing SOB, cough since Sunday. Reports intermittent chest pain while coughing. Patient arrives 74% on room air. Patient reports she is on 3L NC at home as needed. But does not regularly wear home oxygen.

## 2024-10-25 NOTE — ED NOTES
Writer contacted lab regarding repeat troponin sent at 12:17 that originally showed collected by lab then disappeared. Lab states it was hemolyzed but the order was timed as the original sent so they were running it off what they had. Writer stated to not, because that's not how trops work. New troponin sent.

## 2024-10-30 NOTE — ED PROVIDER NOTES
[facility] ED  EMERGENCY DEPARTMENT ENCOUNTER        Pt Name: Esperanza Rubalcava  MRN: [id number]  Birthdate 1960  Date of evaluation: 10/25/2024  Provider: Kumar Blanca PA-C  PCP: Solitario Blackwell MD  Note Started: 11:26 AM EDT 10/30/24      DEMI. I have evaluated this patient.        CHIEF COMPLAINT       Chief Complaint   Patient presents with    Shortness of Breath    Cough       HISTORY OF PRESENT ILLNESS: 1 or more Elements     History From: The patient    Limitations to history : None    Social Determinants Significantly Affecting Health : None    Chief Complaint: Shortness of breath, cough and weakness    Esperanza Rubalcava is a 64 y.o. female who presents to the emergency department, the patient states that she has been sick since Sunday, for 5 days, she has had some diarrhea.  She also states that she has a history of COPD, and has been using her nebulized treatments as well as had increased phlegm.  She does have a history of COPD and continues to smoke.  She thinks that she is dehydrated from the diarrhea.    Nursing Notes were all reviewed and agreed with or any disagreements were addressed in the HPI.    REVIEW OF SYSTEMS :      Review of Systems    Positives and Pertinent negatives as per HPI.     SURGICAL HISTORY     Past Surgical History:   Procedure Laterality Date    BREAST BIOPSY      BRONCHOSCOPY  8/21/2023    BRONCHOSCOPY/TRANSBRONCHIAL NEEDLE BIOPSY ROBOTIC performed by Gilberto Duarte MD at [facility] ENDOSCOPY    BRONCHOSCOPY N/A 8/21/2023    BRONCHOSCOPY BRUSHINGS ROBOTIC performed by Gilberto Duarte MD at [facility] ENDOSCOPY    BRONCHOSCOPY N/A 8/21/2023    BRONCHOSCOPY ALVEOLAR LAVAGE ROBOTIC performed by Gilberto Duarte MD at [facility] ENDOSCOPY    BRONCHOSCOPY  8/21/2023    BRONCHOSCOPY/TRANSBRONCHIAL NEEDLE BIOPSY performed by Gilberto Duarte MD at [facility] ENDOSCOPY    BRONCHOSCOPY  8/21/2023    BRONCHOSCOPY ENDOBRONCHIAL ULTRASOUND performed by Gilberto Duarte MD at [facility]

## 2024-11-02 NOTE — PROGRESS NOTES
Pharmacy faxed request for medication refill.    Preferred pharmacy set up and verified    Pt given written and verbal discharge instructions. Pt indicated understanding of home medication and care instructions. Prescriptions brought up to pt from pharmacy. Pt educated on how to use portable and emergency 02 tanks,, she verbalied understanding on how to use these.   Connected pt to portable 02, on 3L per NC. Pt packed own belongings. Pt taken down to family car via wheelchair.

## 2024-11-20 ENCOUNTER — OFFICE VISIT (OUTPATIENT)
Dept: PULMONOLOGY | Age: 64
End: 2024-11-20
Payer: COMMERCIAL

## 2024-11-20 ENCOUNTER — HOSPITAL ENCOUNTER (OUTPATIENT)
Dept: CT IMAGING | Age: 64
Discharge: HOME | End: 2024-11-20
Payer: COMMERCIAL

## 2024-11-20 VITALS
OXYGEN SATURATION: 89 % | SYSTOLIC BLOOD PRESSURE: 106 MMHG | HEART RATE: 102 BPM | RESPIRATION RATE: 17 BRPM | DIASTOLIC BLOOD PRESSURE: 70 MMHG | HEIGHT: 62 IN | BODY MASS INDEX: 31.21 KG/M2 | WEIGHT: 169.6 LBS

## 2024-11-20 DIAGNOSIS — J44.9 CHRONIC OBSTRUCTIVE PULMONARY DISEASE, UNSPECIFIED COPD TYPE (HCC): ICD-10-CM

## 2024-11-20 DIAGNOSIS — R91.1 LUNG NODULE: Primary | ICD-10-CM

## 2024-11-20 PROCEDURE — 3017F COLORECTAL CA SCREEN DOC REV: CPT | Performed by: INTERNAL MEDICINE

## 2024-11-20 PROCEDURE — 71250 CT THORAX DX C-: CPT

## 2024-11-20 PROCEDURE — G8427 DOCREV CUR MEDS BY ELIG CLIN: HCPCS | Performed by: INTERNAL MEDICINE

## 2024-11-20 PROCEDURE — G8417 CALC BMI ABV UP PARAM F/U: HCPCS | Performed by: INTERNAL MEDICINE

## 2024-11-20 PROCEDURE — 3074F SYST BP LT 130 MM HG: CPT | Performed by: INTERNAL MEDICINE

## 2024-11-20 PROCEDURE — G8484 FLU IMMUNIZE NO ADMIN: HCPCS | Performed by: INTERNAL MEDICINE

## 2024-11-20 PROCEDURE — 4004F PT TOBACCO SCREEN RCVD TLK: CPT | Performed by: INTERNAL MEDICINE

## 2024-11-20 PROCEDURE — 99214 OFFICE O/P EST MOD 30 MIN: CPT | Performed by: INTERNAL MEDICINE

## 2024-11-20 PROCEDURE — 3078F DIAST BP <80 MM HG: CPT | Performed by: INTERNAL MEDICINE

## 2024-11-20 RX ORDER — ROFLUMILAST 500 UG/1
500 TABLET ORAL DAILY
Qty: 30 TABLET | Refills: 1 | Status: SHIPPED | OUTPATIENT
Start: 2024-11-20

## 2024-11-20 NOTE — PROGRESS NOTES
pulmonary nodule is again demonstrated with irregular margins, SUV maximum  3.8.  Tree-in-bud type nodularity is seen adjacent, as well as a 3 mm isolated satellite nodule.  Additional punctate nodules are seen bilaterally, too small to characterize by PET.  No confluent airspace disease.  No pneumothorax or pleural effusion.   ABDOMEN/PELVIS: Excretion of activity is seen from bilateral kidneys and activity is seen within the urinary bladder.  Bowel activity is seen which can be physiologically variable.   3.1 x 2.0 cm left adrenal nodule, Hounsfield units 9, compatible with adrenal adenoma.  Low-density thickening is seen of the medial limb of the right adrenal gland.   Focal fat along the falciform.  Calcification of the abdominal aorta and iliac arteries.  Diverticulosis.   IMPRESSION:  1.9 cm irregular right lower lobe pulmonary nodule has metabolic  characteristics most concerning for primary lung malignancy.   Casper metastatic disease at the right hilum and within the mediastinum.  Additional small bilateral pulmonary nodules, too small to characterize by PET, for which continued CT surveillance is recommended. No findings of FDG avid metastatic disease in the abdomen or pelvis.     - Cytopathology & Labs -   EBUS/monarcy 8/21/23  A. Lung, right lower lobe nodule, TBNA  - No malignant cells identified.   B. Lung, right lower lobe nodule, brushings and brush tip:   - No malignant cells identified.   C. Lymph node, 4L, TBNA  - No malignant cells identified.   D. Lymph node, subcarinal, TBNA  - Atypical cells.   E. Lymph node, 4R, TBNA  - Atypical cells.   F. Lymph node, 11R, TBNA  - Atypical cells.   G. Lung, right lower lobe, bronchial alveolar lavage:   - No malignant cells identified.   H. Bronchial washings:   - No malignant cells identified.   COMMENT:   The definitive morphologic features of malignancy are not   present, squamous metaplasia is present in the cellblock of specimen E,   and the atypia noted

## 2024-11-26 DIAGNOSIS — R91.8 LUNG NODULES: Primary | ICD-10-CM

## 2024-12-04 ENCOUNTER — TELEPHONE (OUTPATIENT)
Dept: FAMILY MEDICINE CLINIC | Age: 64
End: 2024-12-04

## 2024-12-04 DIAGNOSIS — E07.9 THYROID MASS: Primary | ICD-10-CM

## 2024-12-04 NOTE — TELEPHONE ENCOUNTER
Pt is calling and is requesting a referral to endo. Her lung dr found something on her CT scan that has to do with her thyroid and told her to ask for a referral from her pcp. Please advise.

## 2024-12-04 NOTE — TELEPHONE ENCOUNTER
Left message to call back, received fax from Dr.Al Borjas (see scanned) needs referral to endo for adrenal & thyroid mass, referral placed patient needs to call Kat Medel [phone].

## 2024-12-05 ENCOUNTER — TELEPHONE (OUTPATIENT)
Dept: PULMONOLOGY | Age: 64
End: 2024-12-05

## 2024-12-12 ENCOUNTER — TELEPHONE (OUTPATIENT)
Dept: CARDIOLOGY CLINIC | Age: 64
End: 2024-12-12

## 2024-12-12 NOTE — TELEPHONE ENCOUNTER
Attempted to reach pt, could not leave vm.  Received jardiance patient assistance paperwork. On the forms she marked she has private insurance. Pt does not ve medicare/medicaid at all? Is this the same insurance she has always had. Has she tried the copay card?

## 2024-12-14 DIAGNOSIS — J45.41 MODERATE PERSISTENT ASTHMA WITH ACUTE EXACERBATION: ICD-10-CM

## 2024-12-14 DIAGNOSIS — I25.110 CORONARY ARTERY DISEASE INVOLVING NATIVE CORONARY ARTERY OF NATIVE HEART WITH UNSTABLE ANGINA PECTORIS (HCC): ICD-10-CM

## 2024-12-16 RX ORDER — FLUTICASONE FUROATE, UMECLIDINIUM BROMIDE AND VILANTEROL TRIFENATATE 100; 62.5; 25 UG/1; UG/1; UG/1
1 POWDER RESPIRATORY (INHALATION) DAILY
Qty: 60 EACH | Refills: 0 | Status: SHIPPED | OUTPATIENT
Start: 2024-12-16

## 2024-12-16 RX ORDER — FUROSEMIDE 20 MG/1
20 TABLET ORAL DAILY
Qty: 90 TABLET | Refills: 0 | Status: SHIPPED | OUTPATIENT
Start: 2024-12-16

## 2024-12-16 RX ORDER — ALBUTEROL SULFATE 90 UG/1
INHALANT RESPIRATORY (INHALATION)
Qty: 9 G | Refills: 0 | Status: SHIPPED | OUTPATIENT
Start: 2024-12-16

## 2024-12-17 NOTE — TELEPHONE ENCOUNTER
Pt returned call and stated she only has [employer]. Pt has not tried copay card but states [employer] has been giving you assistance on it so far.

## 2024-12-18 NOTE — TELEPHONE ENCOUNTER
Attempted to reach pt, left vm to call the office back. Is pt willing to try copay card? If so we can provide her with one or she can get it from website. It is much easier than pt assistance.

## 2024-12-19 NOTE — TELEPHONE ENCOUNTER
Attempted to reach pt, left vm to call the office back. Patient assistance for 2025 is changing and pt will not be approved as she has commercial insurance. It is advised that pt use copay card.

## 2024-12-20 NOTE — TELEPHONE ENCOUNTER
Called and spoke with pt, she still is requesting we send patient assistance, will fill out. Pt will also  copay card just in case

## 2025-01-08 DIAGNOSIS — J45.41 MODERATE PERSISTENT ASTHMA WITH ACUTE EXACERBATION: ICD-10-CM

## 2025-01-08 DIAGNOSIS — R91.1 LUNG NODULE: ICD-10-CM

## 2025-01-09 RX ORDER — ROFLUMILAST 500 UG/1
500 TABLET ORAL DAILY
Qty: 30 TABLET | Refills: 4 | Status: SHIPPED | OUTPATIENT
Start: 2025-01-09

## 2025-01-09 RX ORDER — FLUTICASONE FUROATE, UMECLIDINIUM BROMIDE AND VILANTEROL TRIFENATATE 100; 62.5; 25 UG/1; UG/1; UG/1
POWDER RESPIRATORY (INHALATION)
Qty: 60 EACH | Refills: 0 | Status: SHIPPED | OUTPATIENT
Start: 2025-01-09

## 2025-01-09 RX ORDER — ALBUTEROL SULFATE 90 UG/1
INHALANT RESPIRATORY (INHALATION)
Qty: 9 G | Refills: 0 | Status: SHIPPED | OUTPATIENT
Start: 2025-01-09

## 2025-01-15 DIAGNOSIS — J45.41 MODERATE PERSISTENT ASTHMA WITH ACUTE EXACERBATION: ICD-10-CM

## 2025-01-15 RX ORDER — ALBUTEROL SULFATE 90 UG/1
INHALANT RESPIRATORY (INHALATION)
Qty: 9 G | Refills: 0 | OUTPATIENT
Start: 2025-01-15

## 2025-01-22 ENCOUNTER — OFFICE VISIT (OUTPATIENT)
Dept: FAMILY MEDICINE CLINIC | Age: 65
End: 2025-01-22
Payer: COMMERCIAL

## 2025-01-22 VITALS
BODY MASS INDEX: 31.64 KG/M2 | DIASTOLIC BLOOD PRESSURE: 76 MMHG | WEIGHT: 173 LBS | HEART RATE: 87 BPM | OXYGEN SATURATION: 87 % | TEMPERATURE: 97.7 F | SYSTOLIC BLOOD PRESSURE: 126 MMHG

## 2025-01-22 DIAGNOSIS — H91.93 HEARING DECREASED, BILATERAL: Primary | ICD-10-CM

## 2025-01-22 DIAGNOSIS — J43.1 PANLOBULAR EMPHYSEMA (HCC): ICD-10-CM

## 2025-01-22 DIAGNOSIS — E11.9 TYPE 2 DIABETES MELLITUS WITHOUT COMPLICATION, WITHOUT LONG-TERM CURRENT USE OF INSULIN (HCC): ICD-10-CM

## 2025-01-22 DIAGNOSIS — I25.110 CORONARY ARTERY DISEASE INVOLVING NATIVE CORONARY ARTERY OF NATIVE HEART WITH UNSTABLE ANGINA PECTORIS (HCC): Primary | ICD-10-CM

## 2025-01-22 DIAGNOSIS — R91.1 PULMONARY NODULE: ICD-10-CM

## 2025-01-22 DIAGNOSIS — I10 ESSENTIAL HYPERTENSION: ICD-10-CM

## 2025-01-22 DIAGNOSIS — E04.1 THYROID NODULE: ICD-10-CM

## 2025-01-22 PROBLEM — J10.1 INFLUENZA B: Status: RESOLVED | Noted: 2024-03-25 | Resolved: 2025-01-22

## 2025-01-22 PROBLEM — J44.1 COPD EXACERBATION (HCC): Status: RESOLVED | Noted: 2024-03-24 | Resolved: 2025-01-22

## 2025-01-22 PROBLEM — S46.912A MUSCLE STRAIN OF LEFT SHOULDER REGION: Status: RESOLVED | Noted: 2020-08-20 | Resolved: 2025-01-22

## 2025-01-22 PROBLEM — J18.9 PNEUMONIA OF RIGHT MIDDLE LOBE DUE TO INFECTIOUS ORGANISM: Status: RESOLVED | Noted: 2024-03-25 | Resolved: 2025-01-22

## 2025-01-22 PROCEDURE — 3074F SYST BP LT 130 MM HG: CPT | Performed by: FAMILY MEDICINE

## 2025-01-22 PROCEDURE — 4004F PT TOBACCO SCREEN RCVD TLK: CPT | Performed by: FAMILY MEDICINE

## 2025-01-22 PROCEDURE — 3017F COLORECTAL CA SCREEN DOC REV: CPT | Performed by: FAMILY MEDICINE

## 2025-01-22 PROCEDURE — 99214 OFFICE O/P EST MOD 30 MIN: CPT | Performed by: FAMILY MEDICINE

## 2025-01-22 PROCEDURE — 3023F SPIROM DOC REV: CPT | Performed by: FAMILY MEDICINE

## 2025-01-22 PROCEDURE — 3078F DIAST BP <80 MM HG: CPT | Performed by: FAMILY MEDICINE

## 2025-01-22 PROCEDURE — 36415 COLL VENOUS BLD VENIPUNCTURE: CPT | Performed by: FAMILY MEDICINE

## 2025-01-22 PROCEDURE — G8417 CALC BMI ABV UP PARAM F/U: HCPCS | Performed by: FAMILY MEDICINE

## 2025-01-22 PROCEDURE — 2022F DILAT RTA XM EVC RTNOPTHY: CPT | Performed by: FAMILY MEDICINE

## 2025-01-22 PROCEDURE — 3046F HEMOGLOBIN A1C LEVEL >9.0%: CPT | Performed by: FAMILY MEDICINE

## 2025-01-22 PROCEDURE — G8427 DOCREV CUR MEDS BY ELIG CLIN: HCPCS | Performed by: FAMILY MEDICINE

## 2025-01-22 SDOH — ECONOMIC STABILITY: FOOD INSECURITY: WITHIN THE PAST 12 MONTHS, YOU WORRIED THAT YOUR FOOD WOULD RUN OUT BEFORE YOU GOT MONEY TO BUY MORE.: NEVER TRUE

## 2025-01-22 SDOH — ECONOMIC STABILITY: FOOD INSECURITY: WITHIN THE PAST 12 MONTHS, THE FOOD YOU BOUGHT JUST DIDN'T LAST AND YOU DIDN'T HAVE MONEY TO GET MORE.: NEVER TRUE

## 2025-01-22 ASSESSMENT — PATIENT HEALTH QUESTIONNAIRE - PHQ9
4. FEELING TIRED OR HAVING LITTLE ENERGY: NOT AT ALL
10. IF YOU CHECKED OFF ANY PROBLEMS, HOW DIFFICULT HAVE THESE PROBLEMS MADE IT FOR YOU TO DO YOUR WORK, TAKE CARE OF THINGS AT HOME, OR GET ALONG WITH OTHER PEOPLE: NOT DIFFICULT AT ALL
SUM OF ALL RESPONSES TO PHQ9 QUESTIONS 1 & 2: 0
SUM OF ALL RESPONSES TO PHQ QUESTIONS 1-9: 0
SUM OF ALL RESPONSES TO PHQ QUESTIONS 1-9: 0
7. TROUBLE CONCENTRATING ON THINGS, SUCH AS READING THE NEWSPAPER OR WATCHING TELEVISION: NOT AT ALL
5. POOR APPETITE OR OVEREATING: NOT AT ALL
9. THOUGHTS THAT YOU WOULD BE BETTER OFF DEAD, OR OF HURTING YOURSELF: NOT AT ALL
1. LITTLE INTEREST OR PLEASURE IN DOING THINGS: NOT AT ALL
3. TROUBLE FALLING OR STAYING ASLEEP: NOT AT ALL
8. MOVING OR SPEAKING SO SLOWLY THAT OTHER PEOPLE COULD HAVE NOTICED. OR THE OPPOSITE, BEING SO FIGETY OR RESTLESS THAT YOU HAVE BEEN MOVING AROUND A LOT MORE THAN USUAL: NOT AT ALL
SUM OF ALL RESPONSES TO PHQ QUESTIONS 1-9: 0
2. FEELING DOWN, DEPRESSED OR HOPELESS: NOT AT ALL
SUM OF ALL RESPONSES TO PHQ QUESTIONS 1-9: 0
6. FEELING BAD ABOUT YOURSELF - OR THAT YOU ARE A FAILURE OR HAVE LET YOURSELF OR YOUR FAMILY DOWN: NOT AT ALL

## 2025-01-22 NOTE — PATIENT INSTRUCTIONS
Please read the healthy family handout that you were given and share it with your family.       Please compare this printed medication list with your medications at home to be sure they are the same.  If you have any medications that are different please contact us immediately at [phone].     Also review your allergies that we have listed, these may also include medications that you have not been able to tolerate, make sure everything listed is correct. If you have any allergies that are different please contact us immediately at [phone].     You may receive a survey in the mail or by email asking about your experience during your visit today. Please complete and return to us so we know how we are serving you.

## 2025-01-22 NOTE — PROGRESS NOTES
Subjective:  Esperanza Rubalcava is here to discuss the following issues.  She has coronary artery disease and has had no chest pain pressure lightheadedness diaphoresis nausea vomiting.  She has emphysema and continues to smoke and continues on respiratory meds.  She had an appointment 2 months ago with pulmonology.  She has pulmonary nodules and on CAT scan at she states a thyroid nodule was noted.  She has an appointment in few months with endocrinology.  She has not had recent blood work including thyroid studies.  She has not had an ultrasound.  She has essential hypertension blood pressure is well-controlled.  She has diabetes and follows an appropriate diet and her weight is stable.  She is compliant with all meds with no side effects.  No polydipsia or polyuria no skin or hair changes or temperature intolerance  Social History     Tobacco Use   Smoking Status Every Day    Current packs/day: 0.50    Average packs/day: 0.5 packs/day for 33.0 years (16.5 ttl pk-yrs)    Types: Cigarettes   Smokeless Tobacco Never   Tobacco Comments    1/2 pack, per smoking hx audit in 2010 pt smoked 20 yrs, at heaviest 1 ppd, average 0.75   Allergies:     Atenolol and Ceftriaxone    Objective:  /76   Pulse 87   Temp 97.7 °F (36.5 °C) (Oral)   Wt 78.5 kg (173 lb)   SpO2 (!) 87%   BMI 31.64 kg/m²    No acute distress, heart regular rate and rhythm without murmur, lungs clear to auscultation easy effort, abdomen nondistended, no clubbing or cyanosis    Assessment:  1. Coronary artery disease involving native coronary artery of native heart with unstable angina pectoris (HCC)    2. Panlobular emphysema (HCC)    3. Pulmonary nodule    4. Thyroid nodule    5. Essential hypertension    6. Type 2 diabetes mellitus without complication, without long-term current use of insulin (HCC)            Plan:  Labs ordered  Thyroid ultrasound  Continue current medications  Continue working with other providers as advised  Follow-up in 6

## 2025-01-23 LAB
ALT SERPL-CCNC: 23 U/L (ref 10–40)
ANION GAP SERPL CALCULATED.3IONS-SCNC: 13 MMOL/L (ref 3–16)
AST SERPL-CCNC: 21 U/L (ref 15–37)
BUN SERPL-MCNC: 9 MG/DL (ref 7–20)
CALCIUM SERPL-MCNC: 9.2 MG/DL (ref 8.3–10.6)
CHLORIDE SERPL-SCNC: 102 MMOL/L (ref 99–110)
CHOLEST SERPL-MCNC: 185 MG/DL (ref 0–199)
CO2 SERPL-SCNC: 26 MMOL/L (ref 21–32)
CREAT SERPL-MCNC: 1 MG/DL (ref 0.6–1.2)
EST. AVERAGE GLUCOSE BLD GHB EST-MCNC: 139.9 MG/DL
GFR SERPLBLD CREATININE-BSD FMLA CKD-EPI: 64 ML/MIN/{1.73_M2}
GLUCOSE SERPL-MCNC: 108 MG/DL (ref 70–99)
HBA1C MFR BLD: 6.5 %
HDLC SERPL-MCNC: 58 MG/DL (ref 40–60)
LDLC SERPL CALC-MCNC: 98 MG/DL
POTASSIUM SERPL-SCNC: 4.4 MMOL/L (ref 3.5–5.1)
SODIUM SERPL-SCNC: 141 MMOL/L (ref 136–145)
TRIGL SERPL-MCNC: 144 MG/DL (ref 0–150)
TSH SERPL DL<=0.005 MIU/L-ACNC: 0.66 UIU/ML (ref 0.27–4.2)
VLDLC SERPL CALC-MCNC: 29 MG/DL

## 2025-01-27 LAB
H CAPSUL AG SER IA-ACNC: NOT DETECTED
H CAPSUL AG SER QL IA: NOT DETECTED

## 2025-02-01 DIAGNOSIS — J45.41 MODERATE PERSISTENT ASTHMA WITH ACUTE EXACERBATION: ICD-10-CM

## 2025-02-03 RX ORDER — ALBUTEROL SULFATE 90 UG/1
INHALANT RESPIRATORY (INHALATION)
Qty: 9 G | Refills: 0 | Status: SHIPPED | OUTPATIENT
Start: 2025-02-03

## 2025-02-09 DIAGNOSIS — J45.41 MODERATE PERSISTENT ASTHMA WITH ACUTE EXACERBATION: ICD-10-CM

## 2025-02-11 RX ORDER — GLIPIZIDE 2.5 MG/1
2.5 TABLET, EXTENDED RELEASE ORAL DAILY
Qty: 30 TABLET | Refills: 0 | Status: SHIPPED | OUTPATIENT
Start: 2025-02-11

## 2025-02-11 RX ORDER — ALBUTEROL SULFATE 90 UG/1
INHALANT RESPIRATORY (INHALATION)
Qty: 9 G | Refills: 0 | Status: SHIPPED | OUTPATIENT
Start: 2025-02-11

## 2025-02-18 ENCOUNTER — OFFICE VISIT (OUTPATIENT)
Dept: ENT CLINIC | Age: 65
End: 2025-02-18
Payer: COMMERCIAL

## 2025-02-18 ENCOUNTER — PROCEDURE VISIT (OUTPATIENT)
Dept: AUDIOLOGY | Age: 65
End: 2025-02-18
Payer: COMMERCIAL

## 2025-02-18 VITALS
HEART RATE: 79 BPM | DIASTOLIC BLOOD PRESSURE: 85 MMHG | SYSTOLIC BLOOD PRESSURE: 131 MMHG | BODY MASS INDEX: 32.2 KG/M2 | WEIGHT: 175 LBS | HEIGHT: 62 IN

## 2025-02-18 DIAGNOSIS — H90.3 SENSORINEURAL HEARING LOSS, BILATERAL: Primary | ICD-10-CM

## 2025-02-18 DIAGNOSIS — H93.12 TINNITUS OF LEFT EAR: ICD-10-CM

## 2025-02-18 DIAGNOSIS — B37.0 THRUSH: ICD-10-CM

## 2025-02-18 DIAGNOSIS — H90.3 SENSORINEURAL HEARING LOSS (SNHL) OF BOTH EARS: Primary | ICD-10-CM

## 2025-02-18 PROCEDURE — G8417 CALC BMI ABV UP PARAM F/U: HCPCS | Performed by: OTOLARYNGOLOGY

## 2025-02-18 PROCEDURE — 3079F DIAST BP 80-89 MM HG: CPT | Performed by: OTOLARYNGOLOGY

## 2025-02-18 PROCEDURE — 4004F PT TOBACCO SCREEN RCVD TLK: CPT | Performed by: OTOLARYNGOLOGY

## 2025-02-18 PROCEDURE — G8427 DOCREV CUR MEDS BY ELIG CLIN: HCPCS | Performed by: OTOLARYNGOLOGY

## 2025-02-18 PROCEDURE — 92567 TYMPANOMETRY: CPT | Performed by: AUDIOLOGIST

## 2025-02-18 PROCEDURE — 92557 COMPREHENSIVE HEARING TEST: CPT | Performed by: AUDIOLOGIST

## 2025-02-18 PROCEDURE — 3017F COLORECTAL CA SCREEN DOC REV: CPT | Performed by: OTOLARYNGOLOGY

## 2025-02-18 PROCEDURE — 3075F SYST BP GE 130 - 139MM HG: CPT | Performed by: OTOLARYNGOLOGY

## 2025-02-18 PROCEDURE — 99204 OFFICE O/P NEW MOD 45 MIN: CPT | Performed by: OTOLARYNGOLOGY

## 2025-02-18 RX ORDER — NYSTATIN 100000 [USP'U]/ML
500000 SUSPENSION ORAL 4 TIMES DAILY
Qty: 200 ML | Refills: 0 | Status: SHIPPED | OUTPATIENT
Start: 2025-02-18 | End: 2025-02-28

## 2025-02-18 ASSESSMENT — ENCOUNTER SYMPTOMS
COUGH: 0
FACIAL SWELLING: 0
TROUBLE SWALLOWING: 0
SORE THROAT: 0
SHORTNESS OF BREATH: 0
APNEA: 0
EYE ITCHING: 0
VOICE CHANGE: 0
SINUS PRESSURE: 0

## 2025-02-18 NOTE — PROGRESS NOTES
[facility]  [address], [address]  [place] [address]  P: [phone]       Patient     Esperanza Rubalcava  1960    ChiefComplaint     Chief Complaint   Patient presents with    Hearing Problem     Bilateral decreased hearing.       History of Present Illness     Esperanza is a 64-year-old female here today for evaluation of hearing loss right greater than left.  Present for 1 year.  Has difficulty hearing people at work.  Denies otalgia, otorrhea, vertigo.    Past Medical History     Past Medical History:   Diagnosis Date    Asthma     CAD (coronary artery disease)     Depression     Diabetes mellitus (HCC)     HTN     Hyperglycemia     Hyperlipidemia        Past Surgical History     Past Surgical History:   Procedure Laterality Date    BREAST BIOPSY      BRONCHOSCOPY  8/21/2023    BRONCHOSCOPY/TRANSBRONCHIAL NEEDLE BIOPSY ROBOTIC performed by Gilberto Duarte MD at [facility] ENDOSCOPY    BRONCHOSCOPY N/A 8/21/2023    BRONCHOSCOPY BRUSHINGS ROBOTIC performed by Gilberto Duarte MD at [facility] ENDOSCOPY    BRONCHOSCOPY N/A 8/21/2023    BRONCHOSCOPY ALVEOLAR LAVAGE ROBOTIC performed by iGlberto Duarte MD at [facility] ENDOSCOPY    BRONCHOSCOPY  8/21/2023    BRONCHOSCOPY/TRANSBRONCHIAL NEEDLE BIOPSY performed by Gilberto Duarte MD at [facility] ENDOSCOPY    BRONCHOSCOPY  8/21/2023    BRONCHOSCOPY ENDOBRONCHIAL ULTRASOUND performed by Gilberto Duarte MD at [facility] ENDOSCOPY    BRONCHOSCOPY  8/21/2023    BRONCHOSCOPY/TRANSBRONCHIAL NEEDLE BIOPSY ADDL LOBE performed by Gilberto Duarte MD at [facility] ENDOSCOPY    BRONCHOSCOPY N/A 4/16/2024    BRONCHOSCOPY ENDOBRONCHIAL ULTRASOUND FINE NEEDLE ASPIRATION performed by Gilberto Duarte MD at [facility] ENDOSCOPY    BRONCHOSCOPY  4/16/2024    BRONCHOSCOPY ALVEOLAR LAVAGE performed by Gilberto Duarte MD at [facility] ENDOSCOPY    BRONCHOSCOPY  4/16/2024    BRONCHOSCOPY/TRANSBRONCHIAL NEEDLE BIOPSY performed by Gilberto Duarte MD at [facility] ENDOSCOPY    BRONCHOSCOPY  4/16/2024

## 2025-02-18 NOTE — PROGRESS NOTES
Esperanza Rubalcava   1960, 64 y.o. female   [id number]       Referring Provider: Connie Brizuela DO  Referral Type: In an order in Epic    Reason for Visit: Evaluation of the cause of disorders of hearing, tinnitus, or balance.    ADULT AUDIOLOGIC EVALUATION      Esperanza Rubalcava is a 64 y.o. female seen today, 2/18/2025 , for an initial audiologic evaluation.  Patient was seen by Connie Brizuela DO following today's evaluation. Patient was alone.    AUDIOLOGIC AND OTHER PERTINENT MEDICAL HISTORY:      Esperanza Rubalcava noted tinnitus.  Patient reports right ear hearing loss for the past year.  She noted constant tinnitus in the left ear.    Esperanza Rubalcava denied otalgia, aural fullness, dizziness, history of occupational/recreational noise exposure, history of head trauma, history of ear surgery, and family history of hearing loss.    Date: 2/18/2025     IMPRESSIONS:      Normal middle ear pressure and compliance, bilaterally.  Abnormal hearing sensitivity, bilaterally, which can affect every day listening needs.  Word understanding was excellent presented at elevated sensation levels, bilaterally.  Hearing aids are recommended at this time.  Follow medical recommendations of Connie Brizuela DO.     ASSESSMENT AND FINDINGS:     Otoscopy revealed: Clear ear canals bilaterally    RIGHT EAR:  Hearing Sensitivity: Mild to a moderately severe sensorineural hearing loss from 250-8000 Hz  Speech Recognition Threshold: 30 dB HL  Word Recognition:Excellent (92%), based on NU-6 25-word list at 65 dBHL with 35 dBHL of masking using recorded speech stimuli.    Tympanometry: Normal peak pressure and compliance, Type A tympanogram, consistent with normal middle ear function.      LEFT EAR:  Hearing Sensitivity: Normal hearing sensitivity to a moderate sensorineural hearing loss from 250-8000 Hz  Speech Recognition Threshold: 20 dB HL  Word Recognition: Excellent (100%), based on NU-6 25-word list at 60 dBHL using recorded speech

## 2025-02-24 ENCOUNTER — HOSPITAL ENCOUNTER (OUTPATIENT)
Dept: ULTRASOUND IMAGING | Age: 65
Discharge: HOME | End: 2025-02-24
Attending: FAMILY MEDICINE
Payer: COMMERCIAL

## 2025-02-24 ENCOUNTER — HOSPITAL ENCOUNTER (OUTPATIENT)
Dept: CT IMAGING | Age: 65
Discharge: HOME | End: 2025-02-24
Attending: FAMILY MEDICINE
Payer: COMMERCIAL

## 2025-02-24 DIAGNOSIS — R91.8 LUNG NODULES: ICD-10-CM

## 2025-02-24 DIAGNOSIS — E04.1 THYROID NODULE: ICD-10-CM

## 2025-02-24 PROBLEM — R06.02 SHORTNESS OF BREATH: Status: RESOLVED | Noted: 2023-08-09 | Resolved: 2025-02-24

## 2025-02-24 PROBLEM — J96.21 ACUTE ON CHRONIC HYPOXIC RESPIRATORY FAILURE (HCC): Status: RESOLVED | Noted: 2024-03-25 | Resolved: 2025-02-24

## 2025-02-24 PROBLEM — T17.500A MUCUS PLUGGING OF BRONCHI: Status: RESOLVED | Noted: 2024-04-16 | Resolved: 2025-02-24

## 2025-02-24 PROCEDURE — 76536 US EXAM OF HEAD AND NECK: CPT

## 2025-02-24 PROCEDURE — 71250 CT THORAX DX C-: CPT

## 2025-02-27 ENCOUNTER — TELEPHONE (OUTPATIENT)
Dept: FAMILY MEDICINE CLINIC | Age: 65
End: 2025-02-27

## 2025-02-27 ENCOUNTER — OFFICE VISIT (OUTPATIENT)
Dept: PULMONOLOGY | Age: 65
End: 2025-02-27
Payer: COMMERCIAL

## 2025-02-27 VITALS
DIASTOLIC BLOOD PRESSURE: 70 MMHG | SYSTOLIC BLOOD PRESSURE: 104 MMHG | BODY MASS INDEX: 30.95 KG/M2 | RESPIRATION RATE: 17 BRPM | HEIGHT: 62 IN | WEIGHT: 168.2 LBS | OXYGEN SATURATION: 87 % | HEART RATE: 94 BPM

## 2025-02-27 DIAGNOSIS — R91.8 LUNG MASS: Primary | ICD-10-CM

## 2025-02-27 PROCEDURE — G8427 DOCREV CUR MEDS BY ELIG CLIN: HCPCS | Performed by: INTERNAL MEDICINE

## 2025-02-27 PROCEDURE — 3074F SYST BP LT 130 MM HG: CPT | Performed by: INTERNAL MEDICINE

## 2025-02-27 PROCEDURE — G8417 CALC BMI ABV UP PARAM F/U: HCPCS | Performed by: INTERNAL MEDICINE

## 2025-02-27 PROCEDURE — 4004F PT TOBACCO SCREEN RCVD TLK: CPT | Performed by: INTERNAL MEDICINE

## 2025-02-27 PROCEDURE — 3017F COLORECTAL CA SCREEN DOC REV: CPT | Performed by: INTERNAL MEDICINE

## 2025-02-27 PROCEDURE — 3078F DIAST BP <80 MM HG: CPT | Performed by: INTERNAL MEDICINE

## 2025-02-27 PROCEDURE — 99214 OFFICE O/P EST MOD 30 MIN: CPT | Performed by: INTERNAL MEDICINE

## 2025-02-27 RX ORDER — FLUTICASONE FUROATE, UMECLIDINIUM BROMIDE AND VILANTEROL TRIFENATATE 200; 62.5; 25 UG/1; UG/1; UG/1
1 POWDER RESPIRATORY (INHALATION) DAILY
Qty: 1 EACH | Refills: 1 | Status: SHIPPED | OUTPATIENT
Start: 2025-02-27

## 2025-02-27 NOTE — TELEPHONE ENCOUNTER
Pt cannot get her Jardiance 10's filled due to needing a PA.  I called the pharmacy to have them fax denial.   Has been faxed to us several times.   I gave pharmacy a different fax number to send to us and still not coming through.   Could you please initiate a PA for this?   Thank You.

## 2025-02-27 NOTE — PROGRESS NOTES
[facility]                                 Pulmonary Consult /Progress Note :                                   Cc SOB     Today visit  Has 40 PP per smoking history  Still smoke 6-10 cigarettes  Still with SOB and BRAR   Has biopsy lung nodule RLL and lymph nodes,per report no malignancy   Results Possible fungal ? Treated by Dr virgen   Complain of SOB and using albuterol about daily  On Trelegy     Still smoke 1/2 pack daily   '    Interval History 23  - had TTNBx of RLL nodule that showed necrosis and rare fungal organisms.       Interval History 10/24/23  - had monarch biopsy with EBUS 23, working on tobacco cessation      Interval History 23  - had CT PET, concerned about potential chemo as her spouse and sister both  during/shortly after chemo.       PRESENTING HPI: 62yo with h/o asthma and CAD and tobacco abuse resulted in LD screening CT CHEST @ [facility] on 23 that showed new RLL 1.8 cm Pulmonary Nodule; patient is here for evaluation and treatment of the same.  Strong family h/o lung cancer; spouse  of stage IV lung cancer.  Has cut down from 1.5 ppd to 1/2 ppd but precontemplative on quitting         reports that she has been smoking cigarettes. She has a 16.5 pack-year smoking history. She has never used smokeless tobacco.        PHYSICAL EXAM:  Vitals:    25 0951   BP: 104/70   Pulse: 94   Resp: 17   SpO2: (!) 87%       Constitutional:  No acute distress.   HENT:  Oropharynx is clear and moist.   Neck: No tracheal deviation present.   Cardiovascular: Normal heart sounds.  No lower extremity edema.  Pulmonary/Chest: No wheezes. No rhonchi. No rales. + decreased breath sounds.  No accessory muscle usage or stridor.   Musculoskeletal: No cyanosis. No clubbing.  Skin: Skin is warm and dry.   Psychiatric: Normal mood and affect.  Neurologic: speech fluent, alert and oriented, strength symmetric

## 2025-02-27 NOTE — TELEPHONE ENCOUNTER
Pt presented herself in office stating that her Jardiance is not being filled. Pt was given patient assistance ppw. Pt asked if she can have some samples. Please advise    Jardiance 10mg, QD, 90 tabs

## 2025-02-27 NOTE — TELEPHONE ENCOUNTER
Called and spoke with [facility] (where script was sent), and they said a PA was needed for medication, and that it has been sent to prescriber, (pcp, ), office. I called and spoke with Linda, at  office and explained the situation and how PA needs completed before they will even approve patient assistance for patient. Linda said she will take care of this.    Called [facility] and they have not processed patient assistance forms yet.      2 bottles of samples given (RN please sign off on samples)  Lot: [id number]  Exp: 12/26      Attempted to reach pt to let her know the above, and that there are samples for her to  at the [facility] office

## 2025-03-03 NOTE — TELEPHONE ENCOUNTER
Submitted PA for Jardiance 10MG tablets   Via [facility] Key: [id number]  STATUS: PENDING.    Follow up done daily; if no decision with in three days we will refax.  If another three days goes by with no decision will call the insurance for status.

## 2025-03-05 NOTE — TELEPHONE ENCOUNTER
The medication is APPROVED.    Outcome  Approved on March 4 by [facility] 2017  Your PA request has been approved. Additional information will be provided in the approval communication. (Message 1148)  Effective Date: 3/3/2025  Authorization Expiration Date: 3/3/2026    If this requires a response please respond to the pool ( P [facility] MEDICATION PRE-AUTH).      Thank you please advise patient.

## 2025-03-10 DIAGNOSIS — I25.110 CORONARY ARTERY DISEASE INVOLVING NATIVE CORONARY ARTERY OF NATIVE HEART WITH UNSTABLE ANGINA PECTORIS (HCC): ICD-10-CM

## 2025-03-10 RX ORDER — FUROSEMIDE 20 MG/1
20 TABLET ORAL DAILY
Qty: 90 TABLET | Refills: 0 | Status: SHIPPED | OUTPATIENT
Start: 2025-03-10

## 2025-03-13 ENCOUNTER — OFFICE VISIT (OUTPATIENT)
Dept: CARDIOLOGY CLINIC | Age: 65
End: 2025-03-13
Payer: COMMERCIAL

## 2025-03-13 VITALS
DIASTOLIC BLOOD PRESSURE: 64 MMHG | WEIGHT: 168.12 LBS | HEIGHT: 62 IN | HEART RATE: 100 BPM | SYSTOLIC BLOOD PRESSURE: 120 MMHG | OXYGEN SATURATION: 85 % | BODY MASS INDEX: 30.94 KG/M2

## 2025-03-13 DIAGNOSIS — I25.10 CORONARY ARTERY DISEASE INVOLVING NATIVE CORONARY ARTERY OF NATIVE HEART WITHOUT ANGINA PECTORIS: Primary | ICD-10-CM

## 2025-03-13 DIAGNOSIS — I25.2 HISTORY OF ST ELEVATION MYOCARDIAL INFARCTION (STEMI): ICD-10-CM

## 2025-03-13 DIAGNOSIS — I10 ESSENTIAL HYPERTENSION: ICD-10-CM

## 2025-03-13 DIAGNOSIS — E78.2 MIXED HYPERLIPIDEMIA: ICD-10-CM

## 2025-03-13 PROCEDURE — 3074F SYST BP LT 130 MM HG: CPT | Performed by: INTERNAL MEDICINE

## 2025-03-13 PROCEDURE — G8417 CALC BMI ABV UP PARAM F/U: HCPCS | Performed by: INTERNAL MEDICINE

## 2025-03-13 PROCEDURE — G2211 COMPLEX E/M VISIT ADD ON: HCPCS | Performed by: INTERNAL MEDICINE

## 2025-03-13 PROCEDURE — 99214 OFFICE O/P EST MOD 30 MIN: CPT | Performed by: INTERNAL MEDICINE

## 2025-03-13 PROCEDURE — G8427 DOCREV CUR MEDS BY ELIG CLIN: HCPCS | Performed by: INTERNAL MEDICINE

## 2025-03-13 PROCEDURE — 3078F DIAST BP <80 MM HG: CPT | Performed by: INTERNAL MEDICINE

## 2025-03-13 PROCEDURE — 4004F PT TOBACCO SCREEN RCVD TLK: CPT | Performed by: INTERNAL MEDICINE

## 2025-03-13 PROCEDURE — 3017F COLORECTAL CA SCREEN DOC REV: CPT | Performed by: INTERNAL MEDICINE

## 2025-03-13 RX ORDER — ROSUVASTATIN CALCIUM 40 MG/1
40 TABLET, COATED ORAL NIGHTLY
Qty: 90 TABLET | Refills: 2 | Status: SHIPPED | OUTPATIENT
Start: 2025-03-13

## 2025-03-13 NOTE — PATIENT INSTRUCTIONS
Increase the rosuvastatin (Crestor) to 40 mg (may double up on what you currently have.  New Rx for 40 mg tablet sent to pharmacy)   Continue all other medications as prescribed   No cardiac testing warranted at this time  Highly recommend you stop smoking [phone]

## 2025-03-13 NOTE — PROGRESS NOTES
CARDIOLOGY FOLLOW UP        Patient Name: Esperanza Rubalcava  Primary Care physician: Solitario Blackwell MD    Reason for Referral/Chief Complaint: Esperanza Rubalcava is a 64 y.o. patient who presents to cardiology clinic today for evaluation and treatment of coronary artery disease.     History of Present Illness:   Esperanza Rubalcava 64 y.o. female with a medical history notable for coronary artery disease (s/p PCI to RCA (2014, STEMI), LCx (2014) and LAD (2017), hypertension, hyperlipidemia, PVC's, tobacco abuse, RYLIE, situational anxiety, Diabetes mellitus type 2, and COPD with chronic hypoxic respiratory failure.    LOV 6/18/24 at which time she was noted to be busy at work, climbing ladders, etc., and despite wearing 3 L at home at times, she was not having shortness of breath that is limiting her work activities.  Denies palpitations or angina.  Occasional foot edema.  Overall stable course.    Today she reports feeling ok.  She is not wearing her O2 in office today.  SaO2 is 85%.  She has a habit of not bring her portable oxygen with her to the office as was the case at last visit as well.  She was encouraged to do so in the future.  She is not overtly short of breath but has stable dyspnea with exertion.  Still performing her activities at work without limitation.  States wears oxygen at home and at night.   She states the Jardiance is very expensive.   She states she filled out patient assistance.  Taking 10 mg samples we gave her on March 3, but taking every other day to try and make it last.  She was told by our office she would not qualify for patient assistance.  Unclear to me if paperwork was submitted.  Otherwise is compliant with medications.  Cost of medications is affordable.  No endorsed side effects. States has had chest twinges, short lived, left chest.  Does not radiate.  Nonexertional.  No exertional chest pressure or heaviness endorsed.  Patient denies  palpitations, dizziness or syncope.

## 2025-03-17 RX ORDER — GLIPIZIDE 2.5 MG/1
2.5 TABLET, EXTENDED RELEASE ORAL DAILY
Qty: 30 TABLET | Refills: 0 | Status: SHIPPED | OUTPATIENT
Start: 2025-03-17

## 2025-03-28 DIAGNOSIS — J45.41 MODERATE PERSISTENT ASTHMA WITH ACUTE EXACERBATION: ICD-10-CM

## 2025-03-28 RX ORDER — ALBUTEROL SULFATE 90 UG/1
INHALANT RESPIRATORY (INHALATION)
Qty: 9 G | Refills: 0 | Status: SHIPPED | OUTPATIENT
Start: 2025-03-28

## 2025-04-15 ENCOUNTER — TELEPHONE (OUTPATIENT)
Dept: PULMONOLOGY | Age: 65
End: 2025-04-15

## 2025-04-15 RX ORDER — GLIPIZIDE 2.5 MG/1
2.5 TABLET, EXTENDED RELEASE ORAL DAILY
Qty: 30 TABLET | Refills: 0 | Status: SHIPPED | OUTPATIENT
Start: 2025-04-15

## 2025-04-23 ENCOUNTER — OFFICE VISIT (OUTPATIENT)
Dept: ENDOCRINOLOGY | Age: 65
End: 2025-04-23
Payer: COMMERCIAL

## 2025-04-23 VITALS
DIASTOLIC BLOOD PRESSURE: 82 MMHG | SYSTOLIC BLOOD PRESSURE: 152 MMHG | HEIGHT: 65 IN | WEIGHT: 172 LBS | HEART RATE: 79 BPM | BODY MASS INDEX: 28.66 KG/M2

## 2025-04-23 DIAGNOSIS — E04.1 THYROID NODULE: Primary | ICD-10-CM

## 2025-04-23 DIAGNOSIS — E11.9 TYPE 2 DIABETES MELLITUS WITHOUT COMPLICATION, WITHOUT LONG-TERM CURRENT USE OF INSULIN (HCC): ICD-10-CM

## 2025-04-23 PROCEDURE — G8417 CALC BMI ABV UP PARAM F/U: HCPCS | Performed by: INTERNAL MEDICINE

## 2025-04-23 PROCEDURE — G8427 DOCREV CUR MEDS BY ELIG CLIN: HCPCS | Performed by: INTERNAL MEDICINE

## 2025-04-23 PROCEDURE — 3017F COLORECTAL CA SCREEN DOC REV: CPT | Performed by: INTERNAL MEDICINE

## 2025-04-23 PROCEDURE — 99204 OFFICE O/P NEW MOD 45 MIN: CPT | Performed by: INTERNAL MEDICINE

## 2025-04-23 PROCEDURE — 4004F PT TOBACCO SCREEN RCVD TLK: CPT | Performed by: INTERNAL MEDICINE

## 2025-04-23 PROCEDURE — 3079F DIAST BP 80-89 MM HG: CPT | Performed by: INTERNAL MEDICINE

## 2025-04-23 PROCEDURE — 2022F DILAT RTA XM EVC RTNOPTHY: CPT | Performed by: INTERNAL MEDICINE

## 2025-04-23 PROCEDURE — G2211 COMPLEX E/M VISIT ADD ON: HCPCS | Performed by: INTERNAL MEDICINE

## 2025-04-23 PROCEDURE — 3044F HG A1C LEVEL LT 7.0%: CPT | Performed by: INTERNAL MEDICINE

## 2025-04-23 PROCEDURE — 3077F SYST BP >= 140 MM HG: CPT | Performed by: INTERNAL MEDICINE

## 2025-04-23 NOTE — PROGRESS NOTES
CHARAN 2.5 x 18 CHARAN mLAD    CT NEEDLE BIOPSY LUNG PERCUTANEOUS W IMAGING GUIDANCE  12/4/2023    CT NEEDLE BIOPSY LUNG PERCUTANEOUS 12/4/2023 [facility] CT SCAN    OTHER SURGICAL HISTORY  08/21/2023    BRONCHOSCOPY/TRANSBRONCHIAL NEEDLE BIOPSY ROBOTIC        FAMILY HISTORY:  Family History   Problem Relation Age of Onset    Heart Attack Mother     Heart Disease Mother     Diabetes Mother     Heart Failure Father     Heart Disease Father         SOCIAL HISTORY:  Social History     Tobacco Use    Smoking status: Every Day     Current packs/day: 0.50     Average packs/day: 0.5 packs/day for 33.0 years (16.5 ttl pk-yrs)     Types: Cigarettes    Smokeless tobacco: Never    Tobacco comments:     1/2 pack, per smoking hx audit in 2010 pt smoked 20 yrs, at heaviest 1 ppd, average 0.75   Substance Use Topics    Alcohol use: No        PHYSICAL EXAM:  BP (!) 152/82   Pulse 79   Ht 1.651 m (5' 5\")   Wt 78 kg (172 lb)   BMI 28.62 kg/m²   Wt Readings from Last 3 Encounters:   04/23/25 78 kg (172 lb)   03/13/25 76.3 kg (168 lb 1.9 oz)   02/27/25 76.3 kg (168 lb 3.2 oz)     Alert and oriented x3  Extraocular motions intact, no exophthalmos, no lid lag   Neck supple, no cervical lymphadenopathy  Thyroid: normal size, normal texture, no palpable nodules  Extremities No tremor to outstretched hand, no edema     LAB:  TSH   Date Value Ref Range Status   04/17/2023 0.49 0.27 - 4.20 uIU/mL Final   11/11/2022 0.51 0.27 - 4.20 uIU/mL Final     TSH Reflex FT4   Date Value Ref Range Status   01/22/2025 0.66 0.27 - 4.20 uIU/mL Final        EXAMINATION:  THYROID ULTRASOUND     2/24/2025     COMPARISON:  11/20/2024     TECHNIQUE:  Real-time and color flow sonographic images were obtained using a linear  transducer. [id number].     HISTORY:  F 63 y/o  old.  ORDERING SYSTEM PROVIDED HISTORY: Thyroid nodule  TECHNOLOGIST PROVIDED HISTORY:  [facility]     FINDINGS:  Right thyroid lobe: 6.7 x 3.4 x 4.2cm     Left thyroid

## 2025-04-26 DIAGNOSIS — J45.41 MODERATE PERSISTENT ASTHMA WITH ACUTE EXACERBATION: ICD-10-CM

## 2025-04-26 DIAGNOSIS — J44.1 COPD EXACERBATION (HCC): Primary | ICD-10-CM

## 2025-04-28 ENCOUNTER — TELEPHONE (OUTPATIENT)
Dept: FAMILY MEDICINE CLINIC | Age: 65
End: 2025-04-28

## 2025-04-28 RX ORDER — ALBUTEROL SULFATE 90 UG/1
INHALANT RESPIRATORY (INHALATION)
Qty: 9 G | Refills: 0 | Status: SHIPPED | OUTPATIENT
Start: 2025-04-28

## 2025-04-28 RX ORDER — FLUTICASONE FUROATE, UMECLIDINIUM BROMIDE AND VILANTEROL TRIFENATATE 200; 62.5; 25 UG/1; UG/1; UG/1
POWDER RESPIRATORY (INHALATION)
Qty: 60 EACH | Refills: 0 | Status: SHIPPED | OUTPATIENT
Start: 2025-04-28

## 2025-04-28 NOTE — TELEPHONE ENCOUNTER
Noted and agree  please provide handicap rx for 5 years and please order sugar monitor and supplies to check once a day and prn

## 2025-04-28 NOTE — TELEPHONE ENCOUNTER
Patient needing 2 new handicap placards. Is this okay to do?     Patient would also like a device to test her sugar.

## 2025-04-28 NOTE — TELEPHONE ENCOUNTER
MALINA to call us back:      Disability placard is up front for her to .  We need to know how often she's testing for her diabetic supplies and where she wants the order sent to.

## 2025-05-12 DIAGNOSIS — J44.9 CHRONIC OBSTRUCTIVE PULMONARY DISEASE, UNSPECIFIED COPD TYPE (HCC): ICD-10-CM

## 2025-05-12 DIAGNOSIS — J44.1 COPD EXACERBATION (HCC): ICD-10-CM

## 2025-05-12 RX ORDER — ALBUTEROL SULFATE 0.83 MG/ML
SOLUTION RESPIRATORY (INHALATION)
Qty: 90 ML | Refills: 0 | Status: SHIPPED | OUTPATIENT
Start: 2025-05-12

## 2025-05-13 RX ORDER — ROFLUMILAST 250 UG/1
250 TABLET ORAL DAILY
Qty: 28 TABLET | Refills: 0 | Status: SHIPPED | OUTPATIENT
Start: 2025-05-13

## 2025-05-14 RX ORDER — GLIPIZIDE 2.5 MG/1
2.5 TABLET, EXTENDED RELEASE ORAL DAILY
Qty: 30 TABLET | Refills: 0 | Status: SHIPPED | OUTPATIENT
Start: 2025-05-14

## 2025-05-25 DIAGNOSIS — J44.1 COPD EXACERBATION (HCC): ICD-10-CM

## 2025-05-27 RX ORDER — FLUTICASONE FUROATE, UMECLIDINIUM BROMIDE AND VILANTEROL TRIFENATATE 200; 62.5; 25 UG/1; UG/1; UG/1
POWDER RESPIRATORY (INHALATION)
Qty: 60 EACH | Refills: 0 | Status: SHIPPED | OUTPATIENT
Start: 2025-05-27

## 2025-06-06 DIAGNOSIS — J44.9 CHRONIC OBSTRUCTIVE PULMONARY DISEASE, UNSPECIFIED COPD TYPE (HCC): ICD-10-CM

## 2025-06-06 DIAGNOSIS — I25.110 CORONARY ARTERY DISEASE INVOLVING NATIVE CORONARY ARTERY OF NATIVE HEART WITH UNSTABLE ANGINA PECTORIS (HCC): ICD-10-CM

## 2025-06-06 RX ORDER — FUROSEMIDE 20 MG/1
20 TABLET ORAL DAILY
Qty: 90 TABLET | Refills: 0 | Status: SHIPPED | OUTPATIENT
Start: 2025-06-06

## 2025-06-06 RX ORDER — GLIPIZIDE 2.5 MG/1
2.5 TABLET, EXTENDED RELEASE ORAL DAILY
Qty: 30 TABLET | Refills: 0 | Status: SHIPPED | OUTPATIENT
Start: 2025-06-06

## 2025-06-07 RX ORDER — ROFLUMILAST 250 UG/1
250 TABLET ORAL DAILY
Qty: 28 TABLET | Refills: 0 | Status: SHIPPED | OUTPATIENT
Start: 2025-06-07

## 2025-06-27 DIAGNOSIS — J44.9 CHRONIC OBSTRUCTIVE PULMONARY DISEASE, UNSPECIFIED COPD TYPE (HCC): ICD-10-CM

## 2025-06-27 DIAGNOSIS — J44.1 COPD EXACERBATION (HCC): ICD-10-CM

## 2025-06-27 RX ORDER — GLIPIZIDE 2.5 MG/1
2.5 TABLET, EXTENDED RELEASE ORAL DAILY
Qty: 30 TABLET | Refills: 0 | Status: SHIPPED | OUTPATIENT
Start: 2025-06-27

## 2025-06-28 RX ORDER — FLUTICASONE FUROATE, UMECLIDINIUM BROMIDE AND VILANTEROL TRIFENATATE 200; 62.5; 25 UG/1; UG/1; UG/1
POWDER RESPIRATORY (INHALATION)
Qty: 60 EACH | Refills: 0 | Status: SHIPPED | OUTPATIENT
Start: 2025-06-28

## 2025-06-28 RX ORDER — ROFLUMILAST 250 UG/1
250 TABLET ORAL DAILY
Qty: 28 TABLET | Refills: 0 | Status: SHIPPED | OUTPATIENT
Start: 2025-06-28

## 2025-06-30 ENCOUNTER — HOSPITAL ENCOUNTER (OUTPATIENT)
Dept: CT IMAGING | Age: 65
Discharge: HOME | End: 2025-06-30
Payer: COMMERCIAL

## 2025-06-30 ENCOUNTER — HOSPITAL ENCOUNTER (OUTPATIENT)
Dept: PULMONOLOGY | Age: 65
Discharge: HOME | End: 2025-06-30
Payer: COMMERCIAL

## 2025-06-30 VITALS — OXYGEN SATURATION: 92 %

## 2025-06-30 DIAGNOSIS — R91.8 LUNG MASS: ICD-10-CM

## 2025-06-30 LAB
DLCO %PRED: 29 %
DLCO PRED: NORMAL
DLCO/VA %PRED: NORMAL
DLCO/VA PRED: NORMAL
DLCO/VA: NORMAL
DLCO: NORMAL
EXPIRATORY TIME-POST: NORMAL
EXPIRATORY TIME: NORMAL
FEF 25-75 %CHNG: NORMAL
FEF 25-75 POST %PRED: NORMAL
FEF 25-75% %PRED-PRE: NORMAL
FEF 25-75% PRED: NORMAL
FEF 25-75-POST: NORMAL
FEF 25-75-PRE: NORMAL
FEV1 %PRED-POST: 34 %
FEV1 %PRED-PRE: 36 %
FEV1 PRED: NORMAL
FEV1-POST: NORMAL
FEV1-PRE: NORMAL
FEV1/FVC %PRED-POST: NORMAL
FEV1/FVC %PRED-PRE: NORMAL
FEV1/FVC PRED: NORMAL
FEV1/FVC-POST: 53 %
FEV1/FVC-PRE: 57 %
FVC %PRED-POST: NORMAL
FVC %PRED-PRE: NORMAL
FVC PRED: NORMAL
FVC-POST: NORMAL
FVC-PRE: NORMAL
GAW %PRED: NORMAL
GAW PRED: NORMAL
GAW: NORMAL
IC PRE %PRED: NORMAL
IC PRED: NORMAL
IC: NORMAL
MEP: NORMAL
MIP: NORMAL
MVV %PRED-PRE: NORMAL
MVV PRED: NORMAL
MVV-PRE: NORMAL
PEF %PRED-POST: NORMAL
PEF %PRED-PRE: NORMAL
PEF PRED: NORMAL
PEF%CHNG: NORMAL
PEF-POST: NORMAL
PEF-PRE: NORMAL
RAW %PRED: NORMAL
RAW PRED: NORMAL
RAW: NORMAL
RV PRE %PRED: NORMAL
RV PRED: NORMAL
RV: NORMAL
SVC %PRED: NORMAL
SVC PRED: NORMAL
SVC: NORMAL
TLC PRE %PRED: 103 %
TLC PRED: NORMAL
TLC: NORMAL
VA %PRED: NORMAL
VA PRED: NORMAL
VA: NORMAL
VTG %PRED: NORMAL
VTG PRED: NORMAL
VTG: NORMAL

## 2025-06-30 PROCEDURE — 94760 N-INVAS EAR/PLS OXIMETRY 1: CPT

## 2025-06-30 PROCEDURE — 94726 PLETHYSMOGRAPHY LUNG VOLUMES: CPT

## 2025-06-30 PROCEDURE — 94640 AIRWAY INHALATION TREATMENT: CPT

## 2025-06-30 PROCEDURE — 94060 EVALUATION OF WHEEZING: CPT

## 2025-06-30 PROCEDURE — 71250 CT THORAX DX C-: CPT

## 2025-06-30 PROCEDURE — 6370000000 HC RX 637 (ALT 250 FOR IP): Performed by: INTERNAL MEDICINE

## 2025-06-30 PROCEDURE — 94729 DIFFUSING CAPACITY: CPT

## 2025-06-30 RX ORDER — ALBUTEROL SULFATE 90 UG/1
4 INHALANT RESPIRATORY (INHALATION) ONCE
Status: COMPLETED | OUTPATIENT
Start: 2025-06-30 | End: 2025-06-30

## 2025-06-30 RX ADMIN — ALBUTEROL SULFATE 4 PUFF: 90 AEROSOL, METERED RESPIRATORY (INHALATION) at 08:08

## 2025-06-30 ASSESSMENT — PULMONARY FUNCTION TESTS
FEV1_PERCENT_PREDICTED_PRE: 36
FEV1/FVC_POST: 53
FEV1/FVC_PRE: 57
FEV1_PERCENT_PREDICTED_POST: 34

## 2025-06-30 NOTE — PROCEDURES
[address] [address]                           PULMONARY FUNCTION      PATIENT NAME: FORD WHITE             : 1960  MED REC NO: [id number]                      ROOM:   ACCOUNT NO: [id number]                       ADMIT DATE: 2025  PROVIDER: Don Dahl MD      DATE OF PROCEDURE: 2025    SURGEON:  Don Dahl MD    REFERRING PHYSICIAN:  ENRIQUE BOWERS    INDICATION:  Lung mass.    OVERALL INTERPRETATION:    1. Spirometry revealed evidence of severe obstructive defect. FEV1 is 0.95 L which is 36% predicted. No significant response to bronchodilators.  FEV1/FVC ratio of 57%.  2. Lung volume revealed normal total lung capacity 5.38 L, which is 103% predicted.  Evidence of air trapping.  Residual volume 3.69 L, which is 176% predicted.  3. Diffusion capacity is severely decreased at 7.37, which is 29% predicted.  4. Flow volume loop suggestive of obstructive defect.    CONCLUSION:    1. Severe obstructive defect with air trapping and severely decreased diffusion capacity.  2. No bronchodilator response.          DON DAHL MD      D:  2025 16:16:15     T:  2025 16:23:25     /SARAY  Job #:  [id number]     Doc#:  [id number]

## 2025-07-01 PROBLEM — R91.8 LUNG MASS: Status: ACTIVE | Noted: 2025-07-01

## 2025-07-03 ENCOUNTER — RESULTS FOLLOW-UP (OUTPATIENT)
Dept: PULMONOLOGY | Age: 65
End: 2025-07-03

## 2025-07-03 DIAGNOSIS — R91.1 LUNG NODULE: Primary | ICD-10-CM

## 2025-07-09 ENCOUNTER — TELEPHONE (OUTPATIENT)
Dept: PULMONOLOGY | Age: 65
End: 2025-07-09

## 2025-07-09 NOTE — TELEPHONE ENCOUNTER
Appointment Change    Called patient to move her appointment to this Friday at 2:45 because Dr.Al Borjas will not be in the office. There was NO Answer and I left a detail message.

## 2025-07-23 ENCOUNTER — OFFICE VISIT (OUTPATIENT)
Dept: FAMILY MEDICINE CLINIC | Age: 65
End: 2025-07-23
Payer: COMMERCIAL

## 2025-07-23 VITALS
TEMPERATURE: 98.2 F | WEIGHT: 178 LBS | HEART RATE: 88 BPM | SYSTOLIC BLOOD PRESSURE: 127 MMHG | BODY MASS INDEX: 29.62 KG/M2 | OXYGEN SATURATION: 87 % | DIASTOLIC BLOOD PRESSURE: 76 MMHG

## 2025-07-23 DIAGNOSIS — R91.1 PULMONARY NODULE: ICD-10-CM

## 2025-07-23 DIAGNOSIS — E04.1 THYROID NODULE: ICD-10-CM

## 2025-07-23 DIAGNOSIS — I10 ESSENTIAL HYPERTENSION: ICD-10-CM

## 2025-07-23 DIAGNOSIS — E11.9 TYPE 2 DIABETES MELLITUS WITHOUT COMPLICATION, WITHOUT LONG-TERM CURRENT USE OF INSULIN (HCC): ICD-10-CM

## 2025-07-23 DIAGNOSIS — I25.110 CORONARY ARTERY DISEASE INVOLVING NATIVE CORONARY ARTERY OF NATIVE HEART WITH UNSTABLE ANGINA PECTORIS (HCC): Primary | ICD-10-CM

## 2025-07-23 DIAGNOSIS — Z12.11 COLON CANCER SCREENING: ICD-10-CM

## 2025-07-23 DIAGNOSIS — R09.02 HYPOXIA: ICD-10-CM

## 2025-07-23 DIAGNOSIS — Z95.5 H/O HEART ARTERY STENT: ICD-10-CM

## 2025-07-23 DIAGNOSIS — J43.1 PANLOBULAR EMPHYSEMA (HCC): ICD-10-CM

## 2025-07-23 PROCEDURE — 36415 COLL VENOUS BLD VENIPUNCTURE: CPT | Performed by: FAMILY MEDICINE

## 2025-07-23 PROCEDURE — 3017F COLORECTAL CA SCREEN DOC REV: CPT | Performed by: FAMILY MEDICINE

## 2025-07-23 PROCEDURE — 99215 OFFICE O/P EST HI 40 MIN: CPT | Performed by: FAMILY MEDICINE

## 2025-07-23 PROCEDURE — G8427 DOCREV CUR MEDS BY ELIG CLIN: HCPCS | Performed by: FAMILY MEDICINE

## 2025-07-23 PROCEDURE — 3078F DIAST BP <80 MM HG: CPT | Performed by: FAMILY MEDICINE

## 2025-07-23 PROCEDURE — 3044F HG A1C LEVEL LT 7.0%: CPT | Performed by: FAMILY MEDICINE

## 2025-07-23 PROCEDURE — 2022F DILAT RTA XM EVC RTNOPTHY: CPT | Performed by: FAMILY MEDICINE

## 2025-07-23 PROCEDURE — 3074F SYST BP LT 130 MM HG: CPT | Performed by: FAMILY MEDICINE

## 2025-07-23 PROCEDURE — 4004F PT TOBACCO SCREEN RCVD TLK: CPT | Performed by: FAMILY MEDICINE

## 2025-07-23 PROCEDURE — G8417 CALC BMI ABV UP PARAM F/U: HCPCS | Performed by: FAMILY MEDICINE

## 2025-07-23 PROCEDURE — 3023F SPIROM DOC REV: CPT | Performed by: FAMILY MEDICINE

## 2025-07-23 RX ORDER — BETAMETHASONE VALERATE 1.2 MG/G
CREAM TOPICAL
Qty: 45 G | Refills: 5 | Status: SHIPPED | OUTPATIENT
Start: 2025-07-23

## 2025-07-23 NOTE — PROGRESS NOTES
Total time spent today before during and after direct patient contact is 46 minutes   subjective:  Esperanza Rubalcava is here to discuss the following issues.  She has coronary artery disease with no recent chest pain pressure lightheadedness diaphoresis nausea vomiting edema.  She is compliant with all medications including Jardiance which was very expensive but she has qualified for a manufactures discount at $10 a month.  She has a history of a heart stent.  She has diabetes with no polydipsia or polyuria.  Her weight is stable.  She tries to follow an appropriate diet.  She has severe emphysema and continues to smoke and is oxygen dependent continues all related medications.  She has numerous pulmonary nodules and is working closely with pulmonology and recently had a CT and PFTs and results are pending.  She has chronic respiratory failure with hypoxia and is on oxygen supplementation and requires recertification.  She has thyroid nodules and recently saw endocrinology who agreed that nodules as seen on ultrasound did not merit biopsy and only repeat ultrasound due in February would be necessary.  She is due for colon cancer screening and is agreeable to Cologuard.  Social History     Tobacco Use   Smoking Status Every Day    Current packs/day: 0.50    Average packs/day: 0.5 packs/day for 33.0 years (16.5 ttl pk-yrs)    Types: Cigarettes   Smokeless Tobacco Never   Tobacco Comments    1/2 pack, per smoking hx audit in 2010 pt smoked 20 yrs, at heaviest 1 ppd, average 0.75   Allergies:     Atenolol and Ceftriaxone    Objective:  /76   Pulse 88   Temp 98.2 °F (36.8 °C) (Oral)   Wt 80.7 kg (178 lb)   SpO2 (!) 87%   BMI 29.62 kg/m²    No acute distress, heart regular rate and rhythm without murmur, lungs clear to auscultation easy effort, abdomen nondistended, no clubbing or cyanosis    Assessment:  1. Coronary artery disease involving native coronary artery of native heart with unstable angina pectoris

## 2025-07-24 LAB
ALT SERPL-CCNC: 26 U/L (ref 10–40)
ANION GAP SERPL CALCULATED.3IONS-SCNC: 15 MMOL/L (ref 3–16)
AST SERPL-CCNC: 22 U/L (ref 15–37)
BUN SERPL-MCNC: 10 MG/DL (ref 7–20)
CALCIUM SERPL-MCNC: 9.3 MG/DL (ref 8.3–10.6)
CHLORIDE SERPL-SCNC: 102 MMOL/L (ref 99–110)
CHOLEST SERPL-MCNC: 150 MG/DL (ref 0–199)
CO2 SERPL-SCNC: 24 MMOL/L (ref 21–32)
CREAT SERPL-MCNC: 0.9 MG/DL (ref 0.6–1.2)
EST. AVERAGE GLUCOSE BLD GHB EST-MCNC: 145.6 MG/DL
GFR SERPLBLD CREATININE-BSD FMLA CKD-EPI: 72 ML/MIN/{1.73_M2}
GLUCOSE SERPL-MCNC: 103 MG/DL (ref 70–99)
HBA1C MFR BLD: 6.7 %
HDLC SERPL-MCNC: 52 MG/DL (ref 40–60)
LDLC SERPL CALC-MCNC: 67 MG/DL
POTASSIUM SERPL-SCNC: 4.8 MMOL/L (ref 3.5–5.1)
SODIUM SERPL-SCNC: 141 MMOL/L (ref 136–145)
TRIGL SERPL-MCNC: 155 MG/DL (ref 0–150)
TSH SERPL DL<=0.005 MIU/L-ACNC: 0.63 UIU/ML (ref 0.27–4.2)
VLDLC SERPL CALC-MCNC: 31 MG/DL

## 2025-08-03 DIAGNOSIS — J44.1 COPD EXACERBATION (HCC): ICD-10-CM

## 2025-08-04 RX ORDER — FLUTICASONE FUROATE, UMECLIDINIUM BROMIDE AND VILANTEROL TRIFENATATE 200; 62.5; 25 UG/1; UG/1; UG/1
1 POWDER RESPIRATORY (INHALATION) DAILY
Qty: 60 EACH | Refills: 0 | Status: SHIPPED | OUTPATIENT
Start: 2025-08-04

## 2025-08-11 ENCOUNTER — OFFICE VISIT (OUTPATIENT)
Dept: PULMONOLOGY | Age: 65
End: 2025-08-11
Payer: COMMERCIAL

## 2025-08-11 VITALS
HEART RATE: 86 BPM | OXYGEN SATURATION: 90 % | DIASTOLIC BLOOD PRESSURE: 60 MMHG | WEIGHT: 175.4 LBS | SYSTOLIC BLOOD PRESSURE: 122 MMHG | RESPIRATION RATE: 17 BRPM | BODY MASS INDEX: 32.28 KG/M2 | HEIGHT: 62 IN

## 2025-08-11 DIAGNOSIS — R91.8 LUNG MASS: Primary | ICD-10-CM

## 2025-08-11 PROCEDURE — 3078F DIAST BP <80 MM HG: CPT | Performed by: INTERNAL MEDICINE

## 2025-08-11 PROCEDURE — G8417 CALC BMI ABV UP PARAM F/U: HCPCS | Performed by: INTERNAL MEDICINE

## 2025-08-11 PROCEDURE — G8427 DOCREV CUR MEDS BY ELIG CLIN: HCPCS | Performed by: INTERNAL MEDICINE

## 2025-08-11 PROCEDURE — 99214 OFFICE O/P EST MOD 30 MIN: CPT | Performed by: INTERNAL MEDICINE

## 2025-08-11 PROCEDURE — 3017F COLORECTAL CA SCREEN DOC REV: CPT | Performed by: INTERNAL MEDICINE

## 2025-08-11 PROCEDURE — 4004F PT TOBACCO SCREEN RCVD TLK: CPT | Performed by: INTERNAL MEDICINE

## 2025-08-11 PROCEDURE — 3074F SYST BP LT 130 MM HG: CPT | Performed by: INTERNAL MEDICINE

## 2025-08-12 LAB — NONINV COLON CA DNA+OCC BLD SCRN STL QL: NEGATIVE

## 2025-08-18 RX ORDER — GLIPIZIDE 2.5 MG/1
2.5 TABLET, EXTENDED RELEASE ORAL DAILY
Qty: 90 TABLET | Refills: 1 | Status: SHIPPED | OUTPATIENT
Start: 2025-08-18

## 2025-09-03 DIAGNOSIS — J44.1 COPD EXACERBATION (HCC): ICD-10-CM

## 2025-09-07 RX ORDER — FLUTICASONE FUROATE, UMECLIDINIUM BROMIDE AND VILANTEROL TRIFENATATE 200; 62.5; 25 UG/1; UG/1; UG/1
POWDER RESPIRATORY (INHALATION)
Qty: 60 EACH | Refills: 0 | Status: SHIPPED | OUTPATIENT
Start: 2025-09-07

## (undated) DEVICE — ENDOBRONCHIAL ULTRASOUND FINE NEEDLE BIOPSY (FNB) DEVICE: Brand: ACQUIRE™ PULMONARY

## (undated) DEVICE — AIRLIFE™ ADULT AEROSOL MASK VINYL, UNDER-THE-CHIN STYLE: Brand: AIRLIFE™

## (undated) DEVICE — SYRINGE MED 50ML LUERSLIP TIP

## (undated) DEVICE — NEEDLE ASPIR 21GA L700MM US GUID TREAT DST END FOR EFFICIENT

## (undated) DEVICE — ADAPTER TBNG DIA15MM SWVL FBROPT BRONCHSCP TERM 2 AXIS PEEP

## (undated) DEVICE — SINGLE USE SUCTION VALVE MAJ-209: Brand: SINGLE USE SUCTION VALVE (STERILE)

## (undated) DEVICE — CONMED SCOPE SAVER BITE BLOCK, 20X27 MM: Brand: SCOPE SAVER

## (undated) DEVICE — NEBULIZER AEROSOL TBNG 7 FT FOR ACUTE CARE NEBUTECH

## (undated) DEVICE — SINGLE USE BIOPSY VALVE MAJ-210: Brand: SINGLE USE BIOPSY VALVE (STERILE)

## (undated) DEVICE — BRUSH BIOPSY CYTOLOGY BRONCH FIB

## (undated) DEVICE — BRONCHOSCOPES MONARCH

## (undated) DEVICE — SHEET,DRAPE,40X58,STERILE: Brand: MEDLINE

## (undated) DEVICE — SYRINGE MED 10ML SLIP TIP BLNT FILL AND LUERLOCK DISP

## (undated) DEVICE — ENDO CARRY-ON PROCEDURE KIT INCLUDES SUCTION TUBING, LUBRICANT, GAUZE, BIOHAZARD STICKER, TRANSPORT PAD AND INTERCEPT BEDSIDE KIT.: Brand: ENDO CARRY-ON PROCEDURE KIT

## (undated) DEVICE — PATCH SENS PT FOR ELECTROMAGNETIC NAVIGATION BRONCHSCP SYS

## (undated) DEVICE — ADAPTER BRONCHSCP FOR USE W/ OLY EDGE